# Patient Record
Sex: FEMALE | Race: WHITE | NOT HISPANIC OR LATINO
[De-identification: names, ages, dates, MRNs, and addresses within clinical notes are randomized per-mention and may not be internally consistent; named-entity substitution may affect disease eponyms.]

---

## 2017-09-26 ENCOUNTER — APPOINTMENT (OUTPATIENT)
Dept: ORTHOPEDIC SURGERY | Facility: CLINIC | Age: 82
End: 2017-09-26
Payer: MEDICARE

## 2017-09-26 DIAGNOSIS — S20.222A CONTUSION OF LEFT BACK WALL OF THORAX, INITIAL ENCOUNTER: ICD-10-CM

## 2017-09-26 PROCEDURE — 20610 DRAIN/INJ JOINT/BURSA W/O US: CPT | Mod: RT

## 2017-09-26 PROCEDURE — 99212 OFFICE O/P EST SF 10 MIN: CPT | Mod: 25

## 2017-09-26 RX ORDER — ASPIRIN 81 MG
81 TABLET, DELAYED RELEASE (ENTERIC COATED) ORAL
Refills: 0 | Status: ACTIVE | COMMUNITY

## 2017-09-26 RX ORDER — TOLTERODINE TARTRATE 4 MG/1
4 CAPSULE, EXTENDED RELEASE ORAL
Qty: 30 | Refills: 0 | Status: COMPLETED | COMMUNITY
Start: 2017-04-25

## 2017-09-26 RX ORDER — LINACLOTIDE 145 UG/1
145 CAPSULE, GELATIN COATED ORAL
Qty: 30 | Refills: 0 | Status: COMPLETED | COMMUNITY
Start: 2017-01-13

## 2018-02-21 ENCOUNTER — EMERGENCY (EMERGENCY)
Facility: HOSPITAL | Age: 83
LOS: 1 days | Discharge: HOME | End: 2018-02-21
Attending: EMERGENCY MEDICINE | Admitting: EMERGENCY MEDICINE
Payer: MEDICARE

## 2018-02-21 VITALS
OXYGEN SATURATION: 97 % | DIASTOLIC BLOOD PRESSURE: 83 MMHG | HEIGHT: 62 IN | HEART RATE: 70 BPM | RESPIRATION RATE: 16 BRPM | SYSTOLIC BLOOD PRESSURE: 133 MMHG | WEIGHT: 115.08 LBS | TEMPERATURE: 98 F

## 2018-02-21 VITALS
DIASTOLIC BLOOD PRESSURE: 73 MMHG | SYSTOLIC BLOOD PRESSURE: 128 MMHG | HEART RATE: 68 BPM | RESPIRATION RATE: 18 BRPM | OXYGEN SATURATION: 98 % | TEMPERATURE: 98 F
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
## 2018-02-21 DIAGNOSIS — M54.5 LOW BACK PAIN: ICD-10-CM
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
## 2018-02-21 DIAGNOSIS — Z79.899 OTHER LONG TERM (CURRENT) DRUG THERAPY: ICD-10-CM

## 2018-02-21 DIAGNOSIS — S32.018A OTHER FRACTURE OF FIRST LUMBAR VERTEBRA, INITIAL ENCOUNTER FOR CLOSED FRACTURE: ICD-10-CM
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
## 2018-02-21 DIAGNOSIS — Z88.0 ALLERGY STATUS TO PENICILLIN: ICD-10-CM

## 2018-02-21 DIAGNOSIS — Z79.82 LONG TERM (CURRENT) USE OF ASPIRIN: ICD-10-CM
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
## 2018-02-21 DIAGNOSIS — Z98.890 OTHER SPECIFIED POSTPROCEDURAL STATES: Chronic | ICD-10-CM

## 2018-02-21 PROCEDURE — 70450 CT HEAD/BRAIN W/O DYE: CPT

## 2018-02-21 PROCEDURE — 72100 X-RAY EXAM L-S SPINE 2/3 VWS: CPT | Mod: 26

## 2018-02-21 PROCEDURE — 70450 CT HEAD/BRAIN W/O DYE: CPT | Mod: 26

## 2018-02-21 PROCEDURE — 72100 X-RAY EXAM L-S SPINE 2/3 VWS: CPT

## 2018-02-21 PROCEDURE — 99284 EMERGENCY DEPT VISIT MOD MDM: CPT | Mod: 25

## 2018-02-21 RX ORDER — OXYCODONE AND ACETAMINOPHEN 5; 325 MG/1; MG/1
1 TABLET ORAL ONCE
Qty: 0 | Refills: 0 | Status: DISCONTINUED | OUTPATIENT
Start: 2018-02-21 | End: 2018-02-21

## 2018-02-21 RX ADMIN — OXYCODONE AND ACETAMINOPHEN 1 TABLET(S): 5; 325 TABLET ORAL at 12:40

## 2018-02-21 RX ADMIN — OXYCODONE AND ACETAMINOPHEN 1 TABLET(S): 5; 325 TABLET ORAL at 12:09

## 2018-02-21 NOTE — ED ADULT TRIAGE NOTE - OTHER COMPLAINTS
pt c.o mechanical fall last thursday, admits to hitting head, no loc. no daily blood thinner. c.o lower back pain. pt able to ambulate with walker assist.

## 2018-02-21 NOTE — ED PROVIDER NOTE - DIAGNOSTIC INTERPRETATION
ER Physician Assistant: Harish Edouard PA-C  INTERPRETATION:  + acute fracture; no soft tissue swelling noted; scoliosis

## 2018-02-21 NOTE — ED PROVIDER NOTE - MEDICAL DECISION MAKING DETAILS
will DC FU PMD given pain meds has walker I have discussed the discharge plan with the patient. The patient agrees with the plan, as discussed.  The patient understands Emergency Department diagnosis is a preliminary diagnosis often based on limited information and that the patient must adhere to the follow-up plan as discussed.  The patient understands that if the symptoms worsen or if prescribed medications do not have the desired/planned effect that the patient may return to the Emergency Department at any time for further evaluation and treatment.

## 2018-02-21 NOTE — ED PROVIDER NOTE - OBJECTIVE STATEMENT
88 y/o female with pmh of fractures and falls presents to ED complaining of lower back pain resulting from fall last Thursday (2/15) when she suddenly fell backwards while using her walker in the apt. Pt hit her head, complaining of pain at back of head at the time of fall, which has since resolved. Pain in lower back has remained constant, taking Tylenol, last dose 8:30 am. States she has poor balance and weakness. Denies LOC, dizziness, heart palpitations. no fever no dizzy no headache no chills no NVD no chest pain no SOB no shakes no aches no other  injury no other complaints

## 2018-02-21 NOTE — ED PROVIDER NOTE - ATTENDING CONTRIBUTION TO CARE
88 y/o female with pmh of fractures and falls presents to ED complaining of lower back pain resulting from fall last Thursday (2/15) when she suddenly fell backwards while using her walker in the apt. Pt hit her head, complaining of pain at back of head at the time of fall, which has since resolved. Pain in lower back has remained constant, taking Tylenol, last dose 8:30 am. States she has poor balance and weakness. Denies LOC, dizziness, heart palpitations. no fever no dizzy no headache no chills no NVD no chest pain no SOB no shakes no aches no other  injury no other complaints  PE no acute distress, pain over lumbar area without neuro deficits on exam, imaging shows L1 compression fx - pt aware but requests discharge, able to ambulate with walker which is her baseline, pain control, ortho and PMD follow up

## 2018-02-21 NOTE — ED ADULT NURSE NOTE - OBJECTIVE STATEMENT
88 y/o female c/o lower back pain since last week, getting worst today. pt report a mechanical fall last week, denies LOC but since then started having back pain. Pt states " I have back pain for about year and half now, I fell that time and had a compression fracture, last week I fell again and when I told my PCP she advised me to come to ER". Pt able to ambulate with a walker, c/o pain 4/10 and intermittent tingling sensation "

## 2018-02-26 ENCOUNTER — APPOINTMENT (OUTPATIENT)
Dept: ORTHOPEDIC SURGERY | Facility: CLINIC | Age: 83
End: 2018-02-26
Payer: MEDICARE

## 2018-02-26 DIAGNOSIS — M70.61 TROCHANTERIC BURSITIS, RIGHT HIP: ICD-10-CM

## 2018-02-26 DIAGNOSIS — M70.62 TROCHANTERIC BURSITIS, LEFT HIP: ICD-10-CM

## 2018-02-26 PROCEDURE — 73521 X-RAY EXAM HIPS BI 2 VIEWS: CPT

## 2018-02-26 PROCEDURE — 99214 OFFICE O/P EST MOD 30 MIN: CPT

## 2018-03-08 ENCOUNTER — RX RENEWAL (OUTPATIENT)
Age: 83
End: 2018-03-08

## 2018-10-10 ENCOUNTER — APPOINTMENT (OUTPATIENT)
Dept: ORTHOPEDIC SURGERY | Facility: CLINIC | Age: 83
End: 2018-10-10
Payer: MEDICARE

## 2018-10-10 DIAGNOSIS — M25.561 PAIN IN RIGHT KNEE: ICD-10-CM

## 2018-10-10 PROCEDURE — 73501 X-RAY EXAM HIP UNI 1 VIEW: CPT | Mod: RT

## 2018-10-10 PROCEDURE — 99214 OFFICE O/P EST MOD 30 MIN: CPT

## 2018-10-10 RX ORDER — OXYCODONE AND ACETAMINOPHEN 5; 325 MG/1; MG/1
5-325 TABLET ORAL
Qty: 30 | Refills: 0 | Status: COMPLETED | COMMUNITY
Start: 2018-02-26 | End: 2018-03-10

## 2018-10-11 ENCOUNTER — FORM ENCOUNTER (OUTPATIENT)
Age: 83
End: 2018-10-11

## 2018-10-12 ENCOUNTER — OUTPATIENT (OUTPATIENT)
Dept: OUTPATIENT SERVICES | Facility: HOSPITAL | Age: 83
LOS: 1 days | End: 2018-10-12
Payer: MEDICARE

## 2018-10-12 ENCOUNTER — APPOINTMENT (OUTPATIENT)
Dept: MRI IMAGING | Facility: HOSPITAL | Age: 83
End: 2018-10-12
Payer: MEDICARE

## 2018-10-12 ENCOUNTER — RESULT REVIEW (OUTPATIENT)
Age: 83
End: 2018-10-12

## 2018-10-12 DIAGNOSIS — Z98.890 OTHER SPECIFIED POSTPROCEDURAL STATES: Chronic | ICD-10-CM

## 2018-10-12 PROCEDURE — 73721 MRI JNT OF LWR EXTRE W/O DYE: CPT | Mod: 26,RT

## 2018-10-12 PROCEDURE — 73721 MRI JNT OF LWR EXTRE W/O DYE: CPT

## 2018-10-18 RX ORDER — OXYCODONE AND ACETAMINOPHEN 2.5; 325 MG/1; MG/1
2.5-325 TABLET ORAL
Qty: 32 | Refills: 0 | Status: COMPLETED | COMMUNITY
Start: 2018-03-08 | End: 2018-08-27

## 2018-10-25 ENCOUNTER — APPOINTMENT (OUTPATIENT)
Dept: ORTHOPEDIC SURGERY | Facility: CLINIC | Age: 83
End: 2018-10-25
Payer: MEDICARE

## 2018-10-25 DIAGNOSIS — S70.01XA CONTUSION OF RIGHT HIP, INITIAL ENCOUNTER: ICD-10-CM

## 2018-10-25 DIAGNOSIS — S70.11XA CONTUSION OF RIGHT HIP, INITIAL ENCOUNTER: ICD-10-CM

## 2018-10-25 PROCEDURE — 99214 OFFICE O/P EST MOD 30 MIN: CPT

## 2018-10-25 PROCEDURE — 72170 X-RAY EXAM OF PELVIS: CPT

## 2018-11-14 ENCOUNTER — APPOINTMENT (OUTPATIENT)
Dept: ORTHOPEDIC SURGERY | Facility: CLINIC | Age: 83
End: 2018-11-14
Payer: MEDICARE

## 2018-11-14 PROCEDURE — 72170 X-RAY EXAM OF PELVIS: CPT

## 2018-11-14 PROCEDURE — 99214 OFFICE O/P EST MOD 30 MIN: CPT

## 2018-11-14 RX ORDER — OXYCODONE AND ACETAMINOPHEN 5; 325 MG/1; MG/1
5-325 TABLET ORAL
Qty: 25 | Refills: 0 | Status: COMPLETED | COMMUNITY
Start: 2018-10-10 | End: 2018-10-22

## 2019-05-07 ENCOUNTER — APPOINTMENT (OUTPATIENT)
Dept: ORTHOPEDIC SURGERY | Facility: CLINIC | Age: 84
End: 2019-05-07
Payer: MEDICARE

## 2019-05-07 DIAGNOSIS — S32.110A NONDISPLACED ZONE I FRACTURE OF SACRUM, INITIAL ENCOUNTER FOR CLOSED FRACTURE: ICD-10-CM

## 2019-05-07 DIAGNOSIS — S32.414A: ICD-10-CM

## 2019-05-07 DIAGNOSIS — S22.000S WEDGE COMPRESSION FRACTURE OF UNSPECIFIED THORACIC VERTEBRA, SEQUELA: ICD-10-CM

## 2019-05-07 DIAGNOSIS — S32.591A OTHER SPECIFIED FRACTURE OF RIGHT PUBIS, INITIAL ENCOUNTER FOR CLOSED FRACTURE: ICD-10-CM

## 2019-05-07 PROCEDURE — 99213 OFFICE O/P EST LOW 20 MIN: CPT

## 2019-05-07 RX ORDER — OXYCODONE AND ACETAMINOPHEN 5; 325 MG/1; MG/1
5-325 TABLET ORAL EVERY 8 HOURS
Qty: 24 | Refills: 0 | Status: COMPLETED | COMMUNITY
Start: 2018-10-18 | End: 2019-05-07

## 2019-05-07 NOTE — PHYSICAL EXAM
[Walker] : ambulates with walker [de-identified] : RIGHT knee\par Knee:\par She walks with a walker.\par Her balance is compromised.\par No erythema, edema, warmth.\par Tender medial joint line.\par Varus alignment RIGHT knee but not LEFT..\par ROM: 0degrees extension to 115 degrees flexion. crepitus\par No instability\par Intact extensor mechanism.\par NVI distally.\par  [LE] : Sensory: Intact in bilateral lower extremities

## 2019-05-07 NOTE — ASSESSMENT
[FreeTextEntry1] : 88-year-old who I treated for many years and has had multiple fractures associated with osteoporosis comes in because of recent persistent knee pain. She's not having any severe, acute pain. She takes Aleve one tablet twice a day and I suggested taking one or 2 Tylenol a day as well if necessary for her pain. Heat and ice. Continue exercise program to keep strong and work on balance.\par She should get a DEXA scan if she hasn't had one. She will see one her last one was in followup with someone on this. In the past she had been on bisphosphonates but nothing in many years as far as I know.\par I talked her about various knee injections including corticosteroid and hyaluronic acid injections. She states that her pain is not bad enough that she would want that now but will consider it if the knee pain worsens.\par She can use over-the-counter topical creams as well as they're helpful. She does use arnica.\par Fortunately her fractures from the fall all healed well.\par Followup as needed

## 2019-05-07 NOTE — HISTORY OF PRESENT ILLNESS
[de-identified] : Ms. Das is now 88 years old and comes in today for her RIGHT knee where she has a history of osteoarthritis.\par She was last seen in November for pelvic fractures. That healed.\par She has chronic pain in the back hips and knees\par . She is not having any acute pain like a insufficiency fracture. Overall she is feeling much better than she has in the past. She is walking with a walker. She does some home exercises. When I last saw her I have recommended she see a metabolic bone specialist but she did and at that time. She has had bone density tests and seen someone in the past. She is not sure when her last DEXA scan was but it was probably several years ago. I will look and see if I can find any DEXA scan on her.\par 4 pain she normally takes one Aleve twice a day

## 2019-09-11 ENCOUNTER — APPOINTMENT (OUTPATIENT)
Dept: ORTHOPEDIC SURGERY | Facility: CLINIC | Age: 84
End: 2019-09-11
Payer: MEDICARE

## 2019-09-11 PROCEDURE — 73030 X-RAY EXAM OF SHOULDER: CPT | Mod: 50

## 2019-09-11 PROCEDURE — 99215 OFFICE O/P EST HI 40 MIN: CPT

## 2019-09-11 NOTE — PHYSICAL EXAM
[UE] : Sensory: Intact in bilateral upper extremities [Rad] : radial 2+ and symmetric bilaterally [Normal Touch] : sensation intact for touch [de-identified] : Shoulders\par There is an abrasion over the LEFT olecranon it looks like it was full thickness with a flap. There is eschar and it is healing and there is no erythema or drainage Minimal tenderness.\par Full elbow range of motion 0-150° and 90° pronation and supination without pain.\par There is a hematoma over the RIGHT posterior arm/triceps region which is somewhat hardBut minimally tender.\par Intact elbow range of motion on the RIGHT side as well without limitation or pain.\par LEFT shoulder is very tender over the distal clavicle near the a.c. joint. Mild tenderness also in bilateral proximal humerus region.\par Range of motion on the RIGHT shoulder actively is with about 160° elevation with discomfort but not severe pain and internal rotation to T11 and external rotation to 50°.\par In the LEFT shoulder there is a lot more pain with elevation actively to 50° and passively to about 80° Stopping because ofpain.\par No obvious deformity is seen.\par Sensation motor intact distally.\par Cervical spinous with mild stiffness but no significant pain. [de-identified] : \par X-ray bilateral shoulders 3 views today show a nondisplaced distal clavicle fracture on the LEFT side without any displacement or elevation.\par No proximal humerus fracture.\par No fractures seen in the RIGHT shoulder.

## 2019-09-11 NOTE — DISCUSSION/SUMMARY
[de-identified] : 80-year-old woman with history of multiple orthopedic injuries in the past and has osteoporosis fell at home a little over a week ago and it looks like she has a nondisplaced distal clavicle fracture on the LEFT and had an abrasion LEFT elbow contusion with hematoma of RIGHT upper arm with possible strain of the RIGHT rotator cuff.\par I recommended a resting to allow the fracture to heal. She was given a sling but can remove the sling if she is sitting in the arm supported. She can move the shoulder slightly her gently if it is pain free. She can use the RIGHT arm as tolerated if there's not pain.\par She may need some physical therapy once she heals. Tylenol if needed for pain.\par She should keep the abrasion cleaned and covered and can wash it in the shower.\par If there is any sign of infection she should let me or her internist now.\par Followup in 3 weeks

## 2019-09-11 NOTE — HISTORY OF PRESENT ILLNESS
[de-identified] : Ms. Das comes in for bilateral shoulder pain. She fell a little over a week ago at home. EMS came to her house. She had an abrasion on the LEFT elbow that was somewhat deep. It was recommended she go to the ER but she didn't.\par She's been keeping the abrasion on the elbow covered. It did bleed a lot.\par She also had a contusion to the RIGHT upper arm and has bruising.\par She has pain in both shoulders much worse on the LEFT than RIGHT. She cannot lift her LEFT arm but can lift the RIGHT one but with pain. She hasn't seen anyone since the fall. She is moving her hands and elbows okay though. She takes Tylenol for pain

## 2019-09-11 NOTE — PROCEDURE
[de-identified] : \par fitted for a sling to LEFT arm\par \par Abrasion over the LEFT posterior olecranon was cleaned and antibiotic ointment was applied followed by a sterile dressing.

## 2019-10-01 PROBLEM — M25.511 BILATERAL SHOULDER PAIN, UNSPECIFIED CHRONICITY: Status: ACTIVE | Noted: 2019-09-11

## 2019-10-02 ENCOUNTER — APPOINTMENT (OUTPATIENT)
Dept: ORTHOPEDIC SURGERY | Facility: CLINIC | Age: 84
End: 2019-10-02
Payer: MEDICARE

## 2019-10-02 DIAGNOSIS — M25.511 PAIN IN RIGHT SHOULDER: ICD-10-CM

## 2019-10-02 DIAGNOSIS — M25.512 PAIN IN RIGHT SHOULDER: ICD-10-CM

## 2019-10-02 PROCEDURE — 99214 OFFICE O/P EST MOD 30 MIN: CPT

## 2019-10-02 PROCEDURE — 73060 X-RAY EXAM OF HUMERUS: CPT | Mod: RT

## 2019-10-02 PROCEDURE — 73000 X-RAY EXAM OF COLLAR BONE: CPT | Mod: 50

## 2019-10-02 NOTE — HISTORY OF PRESENT ILLNESS
[de-identified] : Ms. Das comes in for followup for her LEFT clavicle fracture. she states that the pain has really subsided and she can move her arms better. In general she's getting some achiness and soreness in both upper arms. She uses a walker and when she gets up from a chair she really pushes with the arms a lot and that can be somewhat painful.\par as the LEFT shoulder started to feel better she notices more RIGHT lateral mid humerus pain

## 2019-10-02 NOTE — DISCUSSION/SUMMARY
[de-identified] : 88-year-old distal LEFT clavicle fracture  weeks ago which seems to be healing clinically rather quickly. She should be careful with a lot of pushing or pulling. Pain should guide her. Heat and ice as needed. I think her pain in both shoulders is related to using her upper extremities so much with both ambulation using a walker and also getting up from a chair.She has been working on strengthening her legs to get out of the chair and should continue to do so. She should try to push more with the legs in the arms.\par she did have a contusion to her RIGHT arm. No fractures were seen but there could have been some bruising in the soft tissue and bone. Warm soaks and ice as needed.\par followup as needed

## 2019-10-02 NOTE — PHYSICAL EXAM
[de-identified] : bilateral shoulders\par No edema, ecchymoses, erythema.\par minimal tenderness LEFT distal clavicle. No step-offs.\par Forward elevation actively of both arms is to about 170° elevation without any significant pain. Internal rotation to lower lumbar spine.\par Mild tenderness in the lateral shoulders.\par Sensation and motor intact distally. Intact hand and elbow range of motion.\par Mild tenderness in lateral humerus rIGHT greater than LEFT [de-identified] : exophthalmos related to periorbital fracture and an old fall. Slight drooping RIGHT lip also related to an injury. [de-identified] : \par X-rays of the Bilateral Clavicle 2 views today show good alignment of the LEFT distal clavicle fracture which appears to be healing. Mild arthritis RIGHT a.c. joint.\par AP and lateral x-rays of the RIGHT humerus were taken. Last visit on x-rays of the shoulder there was some dense areas adjacent to the humerus near the deltoid insertion. Today's x-rays show No abnormalities in the soft tissues or bone. Unremarkable.

## 2020-12-09 ENCOUNTER — OUTPATIENT (OUTPATIENT)
Dept: OUTPATIENT SERVICES | Facility: HOSPITAL | Age: 85
LOS: 1 days | End: 2020-12-09

## 2020-12-09 ENCOUNTER — APPOINTMENT (OUTPATIENT)
Dept: OPHTHALMOLOGY | Facility: CLINIC | Age: 85
End: 2020-12-09

## 2020-12-09 DIAGNOSIS — Z98.890 OTHER SPECIFIED POSTPROCEDURAL STATES: Chronic | ICD-10-CM

## 2020-12-10 DIAGNOSIS — H26.491 OTHER SECONDARY CATARACT, RIGHT EYE: ICD-10-CM

## 2021-12-08 ENCOUNTER — APPOINTMENT (OUTPATIENT)
Dept: ORTHOPEDIC SURGERY | Facility: CLINIC | Age: 86
End: 2021-12-08
Payer: MEDICARE

## 2021-12-08 PROCEDURE — 73564 X-RAY EXAM KNEE 4 OR MORE: CPT | Mod: 50

## 2021-12-08 PROCEDURE — 99214 OFFICE O/P EST MOD 30 MIN: CPT | Mod: 25

## 2021-12-08 PROCEDURE — 20610 DRAIN/INJ JOINT/BURSA W/O US: CPT | Mod: LT

## 2021-12-08 NOTE — ASSESSMENT
[FreeTextEntry1] : 89 yo with longstanding right knee arthritis which has gotten progressively worse over time currently severe and also now has some mild left knee arthritis.\par Steroid injection was offered and done in the right knee today which hopefully will give her some relief.  With pain relief perhaps she will feel more steady on her feet.  She should work on strengthening and balance.  She is working with a .  She may see a neurologist and I gave her the name but I am not sure much would be done besides working on the balance.  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  She takes an aspirin a day.  There really has not been any acute event neurologically recently.  I would defer to the neurologist however for any work-up regarding the brain.\par She had hard corn on the tip of her third toe which was debrided today and was very tender.  She should wear good supportive shoes.  She can pad the area.  A flexor tenotomy could be performed to try to straighten out the toe so she is not walking on the tip of the toe which is what is causing the corn.  This can be done in the office.\par She also has callus on the ball of the foot which was debrided.  She should apply Vaseline.\par If she gets good relief on the right knee from steroids we could try the left if it gets worse.  We could also try hyaluronic acid injections in the future.\par She will follow-up as needed.

## 2021-12-08 NOTE — HISTORY OF PRESENT ILLNESS
[de-identified] : Ms. Das is now 89 yo and comes in for bilateral knee pain which is chronic on the right and more recent on the left as well as pain in her right foot and poor balance.  She has had falls due to poor balance and legs giving way.  She had seen a neurologist years ago but thinks perhaps she should see a neurologist again.\par She walks with a walker.\par She has an aide with her today.  She has been doing a lot of home exercises and works out with a .\par Her right knee hurts much more than the left.  She takes Tylenol as needed.  There has not been any injuries.\par She has seen a podiatrist for the right foot.  Someone had debrided some of the callus and corns but then she had ongoing pain.\par She wears sneakers to walk

## 2021-12-08 NOTE — PROCEDURE
[Injection] : Injection [Left] : of the left [Knee Joint] : knee joint [Osteoarthritis] : Osteoarthritis [Patient] : patient [Risk] : risk [Benefits] : benefits [Alternatives] : alternatives [Bleeding] : bleeding [Infection] : infection [Allergic Reaction] : allergic reaction [Verbal Consent Obtained] : verbal consent was obtained prior to the procedure [Alcohol] : Alcohol [Betadine] : Betadine [Ethyl Chloride Spray] : ethyl chloride spray was used as a topical anesthetic [Lateral] : lateral [22] : a 22-gauge [Triamcinolone 40mg/mL___(mL)] : [unfilled] ~UmL of 40mg/mL triamcinolone [Bandage Applied] : a bandage [Tolerated Well] : The patient tolerated the procedure well [None] : none [No Strenuous Activity___day(s)] : avoid strenuous activity for [unfilled] day(s) [PRN] : as needed [de-identified] : Debridement of corns–callus right plantar foot.  On the tip of the third toe where there is a claw toe deformity and hyperflexion there is a hard callus present that is very tender.  There is also callus under the first metatarsal head.  These areas were cleaned with alcohol and then a 10 blade scalpel was used to debride the corn and the callus.  Vaseline was applied to soften the skin.  On her own she should use Vaseline and an emery board to shave down callus.

## 2021-12-08 NOTE — PHYSICAL EXAM
[Normal RLE] : Right Lower Extremity: No scars, rashes, lesions, ulcers, skin intact [Normal LLE] : Left Lower Extremity: No scars, rashes, lesions, ulcers, skin intact [Normal Touch] : sensation intact for touch [Normal] : Oriented to person, place, and time, insight and judgement were intact and the affect was normal [Shuffling] : shuffling [Walker] : ambulates with walker [LE] : Sensory: Intact in bilateral lower extremities [de-identified] : Knees\par No edema, ecchymoses, erythema.\par No effusion.\par Tender right greater than left knee patella facets and medial greater than lateral joint line.\par Knee range of motion is from about 5 to 10 degree flexion contracture to 125 degrees on the right with pain and patellofemoral crepitus.\par On the left knee 0 to 130 degrees with mild discomfort.\par Knees are stable to exam.\par Intact extensor mechanism.\par \par Right foot\par She has hammering and claw toes.  In the tip of the third toe is a hard corn which is very tender.  There is also callus under the first metatarsal which is tender but less than the corn.\par She has tenderness also under the second metatarsal head.\par Thin fat pad.\par Intact anterior tibial tendon, gastrocsoleus, EHL all with good strength.\par Sensation is intact throughout on both feet.\par No edema.  Normal capillary refill and feet are warm. [de-identified] : \par X-rays of bilateral knees 4 views weightbearing today show severe degenerative changes in the right knee with medial joint space narrowing and osteophytes almost bone-on-bone and severe patellofemoral degenerative changes.  On the left knee there is more mild medial and patellofemoral degenerative changes present.

## 2021-12-26 ENCOUNTER — EMERGENCY (EMERGENCY)
Facility: HOSPITAL | Age: 86
LOS: 1 days | Discharge: HOME | End: 2021-12-26
Attending: EMERGENCY MEDICINE | Admitting: EMERGENCY MEDICINE
Payer: MEDICARE

## 2021-12-26 VITALS
HEIGHT: 62 IN | WEIGHT: 119.93 LBS | TEMPERATURE: 99 F | RESPIRATION RATE: 20 BRPM | DIASTOLIC BLOOD PRESSURE: 74 MMHG | HEART RATE: 98 BPM | OXYGEN SATURATION: 95 % | SYSTOLIC BLOOD PRESSURE: 149 MMHG

## 2021-12-26 DIAGNOSIS — S22.089A UNSPECIFIED FRACTURE OF T11-T12 VERTEBRA, INITIAL ENCOUNTER FOR CLOSED FRACTURE: ICD-10-CM
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
## 2021-12-26 DIAGNOSIS — Z79.82 LONG TERM (CURRENT) USE OF ASPIRIN: ICD-10-CM
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
## 2021-12-26 DIAGNOSIS — S32.049A UNSPECIFIED FRACTURE OF FOURTH LUMBAR VERTEBRA, INITIAL ENCOUNTER FOR CLOSED FRACTURE: ICD-10-CM

## 2021-12-26 DIAGNOSIS — S32.039A UNSPECIFIED FRACTURE OF THIRD LUMBAR VERTEBRA, INITIAL ENCOUNTER FOR CLOSED FRACTURE: ICD-10-CM

## 2021-12-26 DIAGNOSIS — S32.029A UNSPECIFIED FRACTURE OF SECOND LUMBAR VERTEBRA, INITIAL ENCOUNTER FOR CLOSED FRACTURE: ICD-10-CM

## 2021-12-26 DIAGNOSIS — Z20.822 CONTACT WITH AND (SUSPECTED) EXPOSURE TO COVID-19: ICD-10-CM

## 2021-12-26 DIAGNOSIS — Z88.0 ALLERGY STATUS TO PENICILLIN: ICD-10-CM

## 2021-12-26 DIAGNOSIS — M19.90 UNSPECIFIED OSTEOARTHRITIS, UNSPECIFIED SITE: ICD-10-CM

## 2021-12-26 DIAGNOSIS — Z98.890 OTHER SPECIFIED POSTPROCEDURAL STATES: Chronic | ICD-10-CM

## 2021-12-26 DIAGNOSIS — M54.50 LOW BACK PAIN, UNSPECIFIED: ICD-10-CM

## 2021-12-26 PROCEDURE — 71045 X-RAY EXAM CHEST 1 VIEW: CPT | Mod: 26

## 2021-12-26 PROCEDURE — 72170 X-RAY EXAM OF PELVIS: CPT | Mod: 26

## 2021-12-26 PROCEDURE — 93010 ELECTROCARDIOGRAM REPORT: CPT

## 2021-12-26 PROCEDURE — 99284 EMERGENCY DEPT VISIT MOD MDM: CPT | Mod: 25

## 2021-12-26 NOTE — ED ADULT NURSE NOTE - NSIMPLEMENTINTERV_GEN_ALL_ED
Implemented All Fall with Harm Risk Interventions:  Brilliant to call system. Call bell, personal items and telephone within reach. Instruct patient to call for assistance. Room bathroom lighting operational. Non-slip footwear when patient is off stretcher. Physically safe environment: no spills, clutter or unnecessary equipment. Stretcher in lowest position, wheels locked, appropriate side rails in place. Provide visual cue, wrist band, yellow gown, etc. Monitor gait and stability. Monitor for mental status changes and reorient to person, place, and time. Review medications for side effects contributing to fall risk. Reinforce activity limits and safety measures with patient and family. Provide visual clues: red socks.

## 2021-12-26 NOTE — ED ADULT NURSE NOTE - OBJECTIVE STATEMENT
s/p mechanical fall at 6pm today while at home, states legs gave out and felt weak, usually uses a walker, denies hitting head, states landed on her buttocks and back, on Aspirin.  Called EMS later tonight as having difficulty walking, unable to walk on own.  No obvious s/s of trauma or deformity noted.

## 2021-12-27 VITALS
DIASTOLIC BLOOD PRESSURE: 79 MMHG | SYSTOLIC BLOOD PRESSURE: 136 MMHG | HEART RATE: 95 BPM | OXYGEN SATURATION: 95 % | RESPIRATION RATE: 18 BRPM | TEMPERATURE: 98 F

## 2021-12-27 LAB
ALBUMIN SERPL ELPH-MCNC: 3.9 G/DL — SIGNIFICANT CHANGE UP (ref 3.3–5)
ALP SERPL-CCNC: 68 U/L — SIGNIFICANT CHANGE UP (ref 40–120)
ALT FLD-CCNC: 11 U/L — SIGNIFICANT CHANGE UP (ref 10–45)
ANION GAP SERPL CALC-SCNC: 5 MMOL/L — SIGNIFICANT CHANGE UP (ref 5–17)
AST SERPL-CCNC: 15 U/L — SIGNIFICANT CHANGE UP (ref 10–40)
BASOPHILS # BLD AUTO: 0.01 K/UL — SIGNIFICANT CHANGE UP (ref 0–0.2)
BASOPHILS NFR BLD AUTO: 0.2 % — SIGNIFICANT CHANGE UP (ref 0–2)
BILIRUB SERPL-MCNC: 0.3 MG/DL — SIGNIFICANT CHANGE UP (ref 0.2–1.2)
BUN SERPL-MCNC: 20 MG/DL — SIGNIFICANT CHANGE UP (ref 7–23)
CALCIUM SERPL-MCNC: 8.5 MG/DL — SIGNIFICANT CHANGE UP (ref 8.4–10.5)
CHLORIDE SERPL-SCNC: 102 MMOL/L — SIGNIFICANT CHANGE UP (ref 96–108)
CO2 SERPL-SCNC: 27 MMOL/L — SIGNIFICANT CHANGE UP (ref 22–31)
CREAT SERPL-MCNC: 0.71 MG/DL — SIGNIFICANT CHANGE UP (ref 0.5–1.3)
EOSINOPHIL # BLD AUTO: 0.01 K/UL — SIGNIFICANT CHANGE UP (ref 0–0.5)
EOSINOPHIL NFR BLD AUTO: 0.2 % — SIGNIFICANT CHANGE UP (ref 0–6)
GLUCOSE SERPL-MCNC: 105 MG/DL — HIGH (ref 70–99)
HCT VFR BLD CALC: 33.9 % — LOW (ref 34.5–45)
HGB BLD-MCNC: 11.3 G/DL — LOW (ref 11.5–15.5)
IMM GRANULOCYTES NFR BLD AUTO: 0.4 % — SIGNIFICANT CHANGE UP (ref 0–1.5)
LYMPHOCYTES # BLD AUTO: 0.48 K/UL — LOW (ref 1–3.3)
LYMPHOCYTES # BLD AUTO: 10 % — LOW (ref 13–44)
MCHC RBC-ENTMCNC: 33.2 PG — SIGNIFICANT CHANGE UP (ref 27–34)
MCHC RBC-ENTMCNC: 33.3 GM/DL — SIGNIFICANT CHANGE UP (ref 32–36)
MCV RBC AUTO: 99.7 FL — SIGNIFICANT CHANGE UP (ref 80–100)
MONOCYTES # BLD AUTO: 0.54 K/UL — SIGNIFICANT CHANGE UP (ref 0–0.9)
MONOCYTES NFR BLD AUTO: 11.3 % — SIGNIFICANT CHANGE UP (ref 2–14)
NEUTROPHILS # BLD AUTO: 3.72 K/UL — SIGNIFICANT CHANGE UP (ref 1.8–7.4)
NEUTROPHILS NFR BLD AUTO: 77.9 % — HIGH (ref 43–77)
NRBC # BLD: 0 /100 WBCS — SIGNIFICANT CHANGE UP (ref 0–0)
PLATELET # BLD AUTO: 205 K/UL — SIGNIFICANT CHANGE UP (ref 150–400)
POTASSIUM SERPL-MCNC: 3.6 MMOL/L — SIGNIFICANT CHANGE UP (ref 3.5–5.3)
POTASSIUM SERPL-SCNC: 3.6 MMOL/L — SIGNIFICANT CHANGE UP (ref 3.5–5.3)
PROT SERPL-MCNC: 6.4 G/DL — SIGNIFICANT CHANGE UP (ref 6–8.3)
RBC # BLD: 3.4 M/UL — LOW (ref 3.8–5.2)
RBC # FLD: 13.1 % — SIGNIFICANT CHANGE UP (ref 10.3–14.5)
SARS-COV-2 RNA SPEC QL NAA+PROBE: NEGATIVE — SIGNIFICANT CHANGE UP
SODIUM SERPL-SCNC: 134 MMOL/L — LOW (ref 135–145)
WBC # BLD: 4.78 K/UL — SIGNIFICANT CHANGE UP (ref 3.8–10.5)
WBC # FLD AUTO: 4.78 K/UL — SIGNIFICANT CHANGE UP (ref 3.8–10.5)

## 2021-12-27 PROCEDURE — 71045 X-RAY EXAM CHEST 1 VIEW: CPT

## 2021-12-27 PROCEDURE — 71045 X-RAY EXAM CHEST 1 VIEW: CPT | Mod: 26

## 2021-12-27 PROCEDURE — 72170 X-RAY EXAM OF PELVIS: CPT

## 2021-12-27 PROCEDURE — 85025 COMPLETE CBC W/AUTO DIFF WBC: CPT

## 2021-12-27 PROCEDURE — 87635 SARS-COV-2 COVID-19 AMP PRB: CPT

## 2021-12-27 PROCEDURE — 72131 CT LUMBAR SPINE W/O DYE: CPT | Mod: MA

## 2021-12-27 PROCEDURE — 36415 COLL VENOUS BLD VENIPUNCTURE: CPT

## 2021-12-27 PROCEDURE — 80053 COMPREHEN METABOLIC PANEL: CPT

## 2021-12-27 PROCEDURE — 72125 CT NECK SPINE W/O DYE: CPT | Mod: MA

## 2021-12-27 PROCEDURE — 72170 X-RAY EXAM OF PELVIS: CPT | Mod: 26

## 2021-12-27 PROCEDURE — 99284 EMERGENCY DEPT VISIT MOD MDM: CPT | Mod: 25

## 2021-12-27 PROCEDURE — 70450 CT HEAD/BRAIN W/O DYE: CPT | Mod: MA

## 2021-12-27 PROCEDURE — 72131 CT LUMBAR SPINE W/O DYE: CPT | Mod: 26,MA

## 2021-12-27 PROCEDURE — 93005 ELECTROCARDIOGRAM TRACING: CPT

## 2021-12-27 PROCEDURE — 70450 CT HEAD/BRAIN W/O DYE: CPT | Mod: 26,MA

## 2021-12-27 PROCEDURE — 97161 PT EVAL LOW COMPLEX 20 MIN: CPT

## 2021-12-27 PROCEDURE — 72125 CT NECK SPINE W/O DYE: CPT | Mod: 26,MA

## 2021-12-27 RX ORDER — ACETAMINOPHEN 500 MG
650 TABLET ORAL ONCE
Refills: 0 | Status: COMPLETED | OUTPATIENT
Start: 2021-12-27 | End: 2021-12-27

## 2021-12-27 RX ADMIN — Medication 650 MILLIGRAM(S): at 01:10

## 2021-12-27 RX ADMIN — Medication 650 MILLIGRAM(S): at 00:21

## 2021-12-27 NOTE — ED PROVIDER NOTE - PATIENT PORTAL LINK FT
You can access the FollowMyHealth Patient Portal offered by [facility] by registering at the following website: [email or web address]. By joining "[employer]"’s FollowMyHealth portal, you will also be able to view your health information using other applications (apps) compatible with our system.

## 2021-12-27 NOTE — ED PROVIDER NOTE - NSFOLLOWUPINSTRUCTIONS_ED_ALL_ED_FT
You have chronic vertebral fractures      WHAT YOU NEED TO KNOW:    A vertebral compression fracture (VCF) is a collapse or breakdown in a bone in your spine. Compression fractures happen when there is too much pressure on the vertebra. VCFs most often occur in the thoracic (middle) and lumbar (lower) areas of your spine. Fractures may be mild to severe.    DISCHARGE INSTRUCTIONS:    Call your local emergency number (911 in the [place]) if:   •You feel lightheaded, short of breath, and have chest pain.    •You cough up blood.    •You suddenly cannot feel your legs.    •You suddenly have trouble moving your arms or legs.    Return to the emergency department if:   •Your arm or leg feels warm, tender, and painful. It may look swollen and red.    •You have new problems urinating or having bowel movements.    •You have severe pain in your back after falling, bending forward, sneezing, or coughing strongly.    Call your doctor or orthopedist if:   •You cannot sleep or rest because of back pain.    •You have pain or swelling in your back that is getting worse, or does not go away.    •You have questions or concerns about your condition or care.    Medicines: You may need any of the following:   •NSAIDs, such as ibuprofen, help decrease swelling, pain, and fever. This medicine is available with or without a doctor's order. NSAIDs can cause stomach bleeding or kidney problems in certain people. If you take blood thinner medicine, always ask if NSAIDs are safe for you. Always read the medicine label and follow directions. Do not give these medicines to children under 6 months of age without direction from your child's healthcare provider.    •Acetaminophen decreases pain and fever. It is available without a doctor's order. Ask how much to take and how often to take it. Follow directions. Read the labels of all other medicines you are using to see if they also contain acetaminophen, or ask your doctor or pharmacist. Acetaminophen can cause liver damage if not taken correctly. Do not use more than 4 grams (4,000 milligrams) total of acetaminophen in one day.     •Prescription pain medicine may be given. Ask your healthcare provider how to take this medicine safely. Some prescription pain medicines contain acetaminophen. Do not take other medicines that contain acetaminophen without talking to your healthcare provider. Too much acetaminophen may cause liver damage. Prescription pain medicine may cause constipation. Ask your healthcare provider how to prevent or treat constipation.     •Bisphosphonates and calcitonin may be recommended to help your bones get stronger. They can decrease the pain of a VCF caused by osteoporosis, and decrease your risk for another fracture.    •Take your medicine as directed. Contact your healthcare provider if you think your medicine is not helping or if you have side effects. Tell him or her if you are allergic to any medicine. Keep a list of the medicines, vitamins, and herbs you take. Include the amounts, and when and why you take them. Bring the list or the pill bottles to follow-up visits. Carry your medicine list with you in case of an emergency.    Heat and ice:   •Apply ice on your back for 15 to 20 minutes every hour or as directed. Use an ice pack, or put crushed ice in a plastic bag. Cover it with a towel before you apply it. Ice helps prevent tissue damage and decreases swelling and pain.    •Apply heat on your back for 20 to 30 minutes every 2 hours for as many days as directed. Heat helps decrease pain and muscle spasms.    Activity:   •Avoid activities that may make the pain worse, such as picking up heavy objects. When the pain decreases, begin normal, slow movements as directed by your healthcare provider. Your healthcare provider may have you do weight-bearing exercises such as walking. You may also do non-weight-bearing exercises such as swimming and bicycling.    •You may need to use a walker or cane. Ask your healthcare provider for more information about how to use a cane or a walker.    •When you  objects, bend at the hips and knees. Never bend from the waist only. Use bent knees and your leg muscles as you lift the object. While you lift the object, keep it close to your chest. Try not to twist or lift anything above your waist.    Physical and occupational therapy: Your healthcare provider may recommend you start or continue one or both types of therapy. A physical therapist teaches you exercises to help improve movement and strength, and to decrease pain. An occupational therapist teaches you skills to help with your daily activities.    Manage pain during sleep:   •Do not sleep on a waterbed. Waterbeds do not provide good back support.    •Sleep on a firm mattress. You may also put a ½ to 1-inch piece of plywood between the mattress and box spring.    •Sleep on your back with a pillow under your knees. This will decrease pressure on your back. You may also sleep on your side with 1 or both of your knees bent and a pillow between them. It may also be helpful to sleep on your stomach with a pillow under you at waist level.    Follow up with your doctor or orthopedist as directed: You may need to return for x-rays or other tests. Write down your questions so you remember to ask them during your visits.

## 2021-12-27 NOTE — ED PROVIDER NOTE - PROGRESS NOTE DETAILS
chronic fractures on CT.  pt unable to get out of bed, will have pt evaluated by PT this morning. pt does not wish to be admitted to the hospital if possible. pt signed out pending PT consult.  Pt ambulatory with walker, wanting to be d/c, seen by case management, will increase home care hours, d/c

## 2021-12-27 NOTE — ED PROVIDER NOTE - CARE PLAN
1 Principal Discharge DX:	Fall  Secondary Diagnosis:	Chronic vertebral fracture due to osteoporosis

## 2021-12-27 NOTE — PHYSICAL THERAPY INITIAL EVALUATION ADULT - THERAPY FREQUENCY, PT EVAL
Patient educated on frequency of inpatient physical therapy at [facility], patient verbalized understanding./2-3x/week

## 2021-12-27 NOTE — PHYSICAL THERAPY INITIAL EVALUATION ADULT - ADDITIONAL COMMENTS
Patient reports previously ambulatory with rollator and assist from HHA. Patient endorses having a HHA daily although between different people and different hours. Patient reports history of falls although not frequently.

## 2021-12-27 NOTE — CONSULT NOTE ADULT - SUBJECTIVE AND OBJECTIVE BOX
Patient is being evaluated by podiatry as part of the Fall Prevention Study    Patient is a 89y old  Female who presents with a chief complaint of fall.    HPI: 89F hx breast ca (s/p mastectomy), arthritis, s/p fall. pt states she ambulates with her walker and fell tonight. states she's not sure how she fell but thinks she probable fell backwards.  pt states she has frequently fallen over the past few years. c/o lower back pain. no HA. no LOC. no vomiting. no chest pain. no SOB.  takes aspirin daily.    Pt endorses to experiencing some memory loss. She states her fall occurred during the day yesterday and not overnight. Pt constantly lost track of what she was saying throughout the encounter. Pt also asked us to repeat questions midway while answering it, because she forgot what the question was.       Review Of Systems  All other ROS are negative, unless stated above.     PAST MEDICAL & SURGICAL HISTORY:  Breast CA    Falls    H/O mastectomy, right      Home Medications:  acetaminophen 325 mg oral tablet: 2 tab(s) orally every 6 hours, As Needed for pain  (21 Feb 2018 10:43)  anastrozole 1 mg oral tablet: 1 tab(s) orally once a day (21 Feb 2018 10:43)  aspirin 81 mg oral tablet: 1 tab(s) orally once a day (21 Feb 2018 10:43)  FLUoxetine 20 mg oral tablet:  orally 2 times a day (21 Feb 2018 10:43)  PROzac:  (21 Feb 2018 10:43)  tolterodine 4 mg oral capsule, extended release: 1 cap(s) orally once a day (21 Feb 2018 10:43)    Allergies    penicillin (Unknown)    Intolerances      FAMILY HISTORY:    Social History:     LABS                        11.3   4.78  )-----------( 205      ( 27 Dec 2021 05:30 )             33.9     12-27    134<L>  |  102  |  20  ----------------------------<  105<H>  3.6   |  27  |  0.71    Ca    8.5      27 Dec 2021 05:30    TPro  6.4  /  Alb  3.9  /  TBili  0.3  /  DBili  x   /  AST  15  /  ALT  11  /  AlkPhos  68  12-27        Vital Signs Last 24 Hrs  T(C): 36.8 (27 Dec 2021 09:02), Max: 37.3 (26 Dec 2021 23:36)  T(F): 98.3 (27 Dec 2021 09:02), Max: 99.1 (26 Dec 2021 23:36)  HR: 78 (27 Dec 2021 09:02) (78 - 98)  BP: 135/84 (27 Dec 2021 09:02) (133/82 - 149/74)  BP(mean): --  RR: 18 (27 Dec 2021 09:02) (16 - 20)  SpO2: 97% (27 Dec 2021 09:02) (95% - 97%)    MEDICATION REVIEW     Subjective -   SELF-ASSESSMENT SCORE (Patient's Score)    FALL RISK/FALL HISTORY QUESTIONNAIRE  (Please see handout for more detailed questionnaire)        a. Greater than 65 years of age?x __Yes  __ No        b. Information on Current Fall        Where did fall occur? At home         What type of fall did you experience? __Loss of Footing  _x__Loss of Balance  ___Unknown  ___Slipping or Tripping  ___Other         When did the fall occur? Yesterday        On scale from 1-10, how severe/painful was injury? 8        c. History of Prior Falls in the Past Five Years         How many falls have you experienced in the past five years? ___One   ___Two  _x_Three or more          Where do your falls often occur? ___At Home   __x_Other___outdoor and indoor at home ___________         When do the falls typically occur? _x__Morning  __x__Afternoon  ___Night          In the past five years, how did you treat the falls you experienced? _x_Self-treatment x__Emergency Department __ Hospital admission/surgery         What are the primary reasons for your fall? __loss of footing __tripping on environmental factors __x loss of balance __unknown          On a scale from 1-10 how severe/ painful were your falls in the past five years? 8                  d. MEMORY/COGNITION          a. Has the patient been diagnosed with any of the following? Pt endorses to dementia but hasn't professionally been dx'd         _x_ Dementia __ALzheimer's __Cerebrovascular __CVA/TIA          e. AMBULATORY AIDS          a. Does the patient have problems doing any of the following- please select all that apply:          _x_Walking  __Feeding  __Dressing & grooming  __Toileting __Bathing __Transferring           b. Did the patient feel lightheaded when standing from a sitting position? x__ Yes __No           c. Does the patient use any of the following- please select all that apply: _x__Cane  __x_Walker  ___Wheelchair  ___Brace/ AFO  ___Other            d. Did the patient have any questions regarding an ambulatory aid? No    RISK STRATIFICATION    [x ] 2 or more falls --> High risk Had over 30 falls in past 10 years   [ ] 1 fall resulting in injury --> High risk   [ ] 1 fall resulting in no injury --> Moderate risk   [ ] 0 falls with gait, strength, balance problems --> moderate risk  [ ] 0 falls without issues --> low risk       PHYSICAL EXAM  General: Well appearing patient with good hygeine and normal body habitus    Lower Extremity Focused:  Vasc: DP 2/4 b/l and PT 1/4 b/l; CFT brisk x10; no edema or erythema. Varicosities present diffusely throughout both feet   Derm: Skin is clean, dry and intact. No open lesions. Hyperkeratotic lesion on submet 1 on R foot and distal tip of 3rd toe on the R foot. Pinched callus on the L (medial IPJ of hallux)   Neuro: Protective sensation intact  MSK: 5/5 muscle strength in all crural compartments b/l. Hammering of the lesser digits b/l. 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     MRN: [id number]  Age: 89y    Hip Internal ROM R: 30  L:   30  Hip External ROM R:40   L:    40  Reference: Internal (30-45); External (40-50)    Sagittal Knee Position:  Right: [x ] Normal, [ ] Flexed, [ ] Recurvatum  Left: [x ] Normal, [ ] Flexed, [ ] Recurvatum    Frontal Plane Leg:  Right: [x ] Normal, [ ] Varum, [ ] Valgum  Left: [x ] Normal, [ ] Varum, [ ] Valgum    Malleolar Position:  Right: [x ] External, [ ] Internal  Left: [ x] External, [ ] Internal    Limb Length Discrepancy  Right: [ ] Longer, [ ] Bath  _0__ cm  Left: [ ] Longer, [ ] Bath  _0__ cm    Ankle, knee extended  Right: [ ] Normal, [x ] Limited, [ ] Crepitus  Left: [ ] Normal, [ x] Limited, [ ] Crepitus    Ankle, knee flexed  Right: [ ] Normal, [ x] Limited, [ ] Crepitus  Left: [ ] Normal, [ x] Limited, [ ] Crepitus    RCSP  Right: [ ] Vertical, [ ] Varus, [x ] Valgus  Left: [ ] Vertical, [ ] Varus, [ x] Valgus    NCSP:  Right: [ ] Vertical, [ ] Varus, [ x] Valgus  Left: [ ] Vertical, [ ] Varus, [x ] Valgus    STJ ROM:  Right: [x ] Normal, [ ] Limited, [ ] Crepitus  Left: [x ] Normal, [ ] Limited, [ ] Crepitus    MTJ ROM:  Right: [x ] Normal, [ ] Limited, [ ] Crepitus  Left: [x ] Normal, [ ] Limited, [ ] Crepitus    FF to RF Relationship  Right: [ ] Normal, [ x] Varus, [ ] Valgus  Left: [ ] Normal, [ x] Varus, [ ] Valgus    1st Ray Position  Right: [ ] Neutral, [ ] Dorsiflexed, [ ] Plantarflexed, [x ] Hypermobile  Left: [ ] Neutral, [ ] Dorsiflexed, [ ] Plantarflexed, [x ] Hypermobile    1st MTP ROM, loaded  Right: [ ] Normal, [x ] Limited, [ ] Crepitus  Left: [ ] Normal, [ x] Limited, [ ] Crepitus    1st MTP ROM, unloaded  Right: [ ] Normal, [x ] Limited, [ ] Crepitus  Left: [] Normal, [ x] Limited, [ ] Crepitus    Muscle Strength  Posterior Group  R: [x ] 5, [ ] 4, [ ] 3, [ ] 2, [ ] 1  L: [x ] 5, [ ] 4, [ ] 3, [ ] 2, [ ] 1  Anterior Group  R: [ x] 5, [ ] 4, [ ] 3, [ ] 2, [ ] 1  L: [x ] 5, [ ] 4, [ ] 3, [ ] 2, [ ] 1  Medial Group  R: [x ] 5, [ ] 4, [ ] 3, [ ] 2, [ ] 1  L: [x ] 5, [ ] 4, [ ] 3, [ ] 2, [ ] 1  Lateral Group  R: [x ] 5, [ ] 4, [ ] 3, [ ] 2, [ ] 1  L: [x ] 5, [ ] 4, [ ] 3, [ ] 2, [ ] 1    Gait Examination: Normal gait & stance, however ambulates with walker   Treatment: Per plan          Recommendations:  - Recommend PT for gait and strength training- PT has already seen her- please refer to PT notes   - Recommend home visit to evaluate risk factors of falls  - Continue care with Podiatrist

## 2021-12-27 NOTE — PHYSICAL THERAPY INITIAL EVALUATION ADULT - GAIT DEVIATIONS NOTED, PT EVAL
slightly unsteady gait however no LOB/knee buckling noted; flexed forward posture, **significantly increased time required with turning; encouraged patient to increase B/L step-height with stepping however patient states "When I do that, that's when I fall"/decreased lisa/decreased velocity of limb motion/decreased step length/decreased weight-shifting ability

## 2021-12-27 NOTE — ED PROVIDER NOTE - OBJECTIVE STATEMENT
89F hx breast ca (s/p mastectomy), arthritis, s/p fall. pt states she ambulates with her walker and fell tonight. states she's not sure how she fell but thinks she probable fell backwards.  pt states she has frequently fallen over the past few years. c/o lower back pain. no HA. no LOC. no vomiting. no chest pain. no SOB.  takes aspirin daily.

## 2021-12-27 NOTE — PHYSICAL THERAPY INITIAL EVALUATION ADULT - DISCHARGE DISPOSITION, PT EVAL
Home with Home PT and increased HHA hours (endorsed by patient "she will go home with someone to sleep over tonight" and confirmed process with KYLIE Almonte); patient declining DOT at this time

## 2021-12-27 NOTE — ED PROVIDER NOTE - CLINICAL SUMMARY MEDICAL DECISION MAKING FREE TEXT BOX
s/p fall tonight, c/o lower back pain. no focal neuro deficits, no bony deformities on exam, no HA  -check xrays  -check CT  -tylenol

## 2021-12-27 NOTE — PHYSICAL THERAPY INITIAL EVALUATION ADULT - PERTINENT HX OF CURRENT PROBLEM, REHAB EVAL
89F hx breast ca (s/p mastectomy), arthritis, s/p fall. pt states she ambulates with her walker and fell tonight. states she's not sure how she fell but thinks she probable fell backwards.  pt states she has frequently fallen over the past few years. c/o lower back pain. no HA. no LOC. no vomiting. no chest pain. no SOB.  takes aspirin daily. Please refer to H&P on [place] for remaining.

## 2021-12-27 NOTE — PHYSICAL THERAPY INITIAL EVALUATION ADULT - GENERAL OBSERVATIONS, REHAB EVAL
PT IE completed. Chart reviewed. Patient without complaints of pain at rest, agreeable to PT. Patient received semi-supine, NAD, +IV hep CHELO simmons ([facility] ED).

## 2022-04-19 ENCOUNTER — INPATIENT (INPATIENT)
Facility: HOSPITAL | Age: 87
LOS: 1 days | Discharge: HOME | DRG: 552 | End: 2022-04-21
Admitting: HOSPITALIST
Payer: MEDICARE

## 2022-04-19 VITALS
WEIGHT: 115.08 LBS | OXYGEN SATURATION: 97 % | HEART RATE: 68 BPM | TEMPERATURE: 98 F | DIASTOLIC BLOOD PRESSURE: 90 MMHG | HEIGHT: 62 IN | SYSTOLIC BLOOD PRESSURE: 168 MMHG | RESPIRATION RATE: 18 BRPM

## 2022-04-19 DIAGNOSIS — Z98.890 OTHER SPECIFIED POSTPROCEDURAL STATES: Chronic | ICD-10-CM

## 2022-04-19 LAB
ALBUMIN SERPL ELPH-MCNC: 3.7 G/DL — SIGNIFICANT CHANGE UP (ref 3.3–5)
ALP SERPL-CCNC: 62 U/L — SIGNIFICANT CHANGE UP (ref 40–120)
ALT FLD-CCNC: 14 U/L — SIGNIFICANT CHANGE UP (ref 10–45)
ANION GAP SERPL CALC-SCNC: 9 MMOL/L — SIGNIFICANT CHANGE UP (ref 5–17)
AST SERPL-CCNC: 22 U/L — SIGNIFICANT CHANGE UP (ref 10–40)
BILIRUB SERPL-MCNC: 0.2 MG/DL — SIGNIFICANT CHANGE UP (ref 0.2–1.2)
BUN SERPL-MCNC: 21 MG/DL — SIGNIFICANT CHANGE UP (ref 7–23)
CALCIUM SERPL-MCNC: 9.1 MG/DL — SIGNIFICANT CHANGE UP (ref 8.4–10.5)
CHLORIDE SERPL-SCNC: 104 MMOL/L — SIGNIFICANT CHANGE UP (ref 96–108)
CO2 SERPL-SCNC: 26 MMOL/L — SIGNIFICANT CHANGE UP (ref 22–31)
CREAT SERPL-MCNC: 0.73 MG/DL — SIGNIFICANT CHANGE UP (ref 0.5–1.3)
EGFR: 78 ML/MIN/1.73M2 — SIGNIFICANT CHANGE UP
GLUCOSE SERPL-MCNC: 99 MG/DL — SIGNIFICANT CHANGE UP (ref 70–99)
HCT VFR BLD CALC: 36.2 % — SIGNIFICANT CHANGE UP (ref 34.5–45)
HGB BLD-MCNC: 11.8 G/DL — SIGNIFICANT CHANGE UP (ref 11.5–15.5)
MCHC RBC-ENTMCNC: 32.6 GM/DL — SIGNIFICANT CHANGE UP (ref 32–36)
MCHC RBC-ENTMCNC: 32.8 PG — SIGNIFICANT CHANGE UP (ref 27–34)
MCV RBC AUTO: 100.6 FL — HIGH (ref 80–100)
NRBC # BLD: 0 /100 WBCS — SIGNIFICANT CHANGE UP (ref 0–0)
PLATELET # BLD AUTO: 229 K/UL — SIGNIFICANT CHANGE UP (ref 150–400)
POTASSIUM SERPL-MCNC: 4.4 MMOL/L — SIGNIFICANT CHANGE UP (ref 3.5–5.3)
POTASSIUM SERPL-SCNC: 4.4 MMOL/L — SIGNIFICANT CHANGE UP (ref 3.5–5.3)
PROT SERPL-MCNC: 6.7 G/DL — SIGNIFICANT CHANGE UP (ref 6–8.3)
RBC # BLD: 3.6 M/UL — LOW (ref 3.8–5.2)
RBC # FLD: 13 % — SIGNIFICANT CHANGE UP (ref 10.3–14.5)
SARS-COV-2 RNA SPEC QL NAA+PROBE: NEGATIVE — SIGNIFICANT CHANGE UP
SODIUM SERPL-SCNC: 139 MMOL/L — SIGNIFICANT CHANGE UP (ref 135–145)
WBC # BLD: 7.41 K/UL — SIGNIFICANT CHANGE UP (ref 3.8–10.5)
WBC # FLD AUTO: 7.41 K/UL — SIGNIFICANT CHANGE UP (ref 3.8–10.5)

## 2022-04-19 PROCEDURE — 71045 X-RAY EXAM CHEST 1 VIEW: CPT | Mod: 26

## 2022-04-19 PROCEDURE — 99285 EMERGENCY DEPT VISIT HI MDM: CPT | Mod: FS,25

## 2022-04-19 PROCEDURE — 72125 CT NECK SPINE W/O DYE: CPT | Mod: 26,MG

## 2022-04-19 PROCEDURE — 73523 X-RAY EXAM HIPS BI 5/> VIEWS: CPT | Mod: 26

## 2022-04-19 PROCEDURE — G1004: CPT

## 2022-04-19 PROCEDURE — 70450 CT HEAD/BRAIN W/O DYE: CPT | Mod: 26,MG

## 2022-04-19 PROCEDURE — 72131 CT LUMBAR SPINE W/O DYE: CPT | Mod: 26,MG

## 2022-04-19 RX ORDER — ENOXAPARIN SODIUM 100 MG/ML
40 INJECTION SUBCUTANEOUS EVERY 24 HOURS
Refills: 0 | Status: DISCONTINUED | OUTPATIENT
Start: 2022-04-20 | End: 2022-04-21

## 2022-04-19 RX ORDER — LOSARTAN POTASSIUM 100 MG/1
50 TABLET, FILM COATED ORAL DAILY
Refills: 0 | Status: DISCONTINUED | OUTPATIENT
Start: 2022-04-19 | End: 2022-04-19

## 2022-04-19 RX ORDER — ACETAMINOPHEN 500 MG
1000 TABLET ORAL ONCE
Refills: 0 | Status: COMPLETED | OUTPATIENT
Start: 2022-04-19 | End: 2022-04-19

## 2022-04-19 RX ADMIN — Medication 1000 MILLIGRAM(S): at 21:22

## 2022-04-19 RX ADMIN — Medication 1000 MILLIGRAM(S): at 20:10

## 2022-04-19 NOTE — ED ADULT NURSE NOTE - NSIMPLEMENTINTERV_GEN_ALL_ED
Implemented All Fall with Harm Risk Interventions:  [place] to call system. Call bell, personal items and telephone within reach. Instruct patient to call for assistance. Room bathroom lighting operational. Non-slip footwear when patient is off stretcher. Physically safe environment: no spills, clutter or unnecessary equipment. Stretcher in lowest position, wheels locked, appropriate side rails in place. Provide visual cue, wrist band, yellow gown, etc. Monitor gait and stability. Monitor for mental status changes and reorient to person, place, and time. Review medications for side effects contributing to fall risk. Reinforce activity limits and safety measures with patient and family. Provide visual clues: red socks.

## 2022-04-19 NOTE — ED ADULT NURSE REASSESSMENT NOTE - NS ED NURSE REASSESS COMMENT FT1
Patient for admit, back pain improved /sp Tylenol pO 1000mg.  Declines need to use bathroom at this time.  Vital signs stable.  Repositioned for comfort.  Fall precaution observed.

## 2022-04-19 NOTE — ED PROVIDER NOTE - NS ED ATTENDING STATEMENT MOD
I have seen and examined this patient and fully participated in the care of this patient as the teaching attending.  The service was shared with the PONCE.  I reviewed and verified the documentation and independently performed the documented:

## 2022-04-19 NOTE — ED PROVIDER NOTE - NS ED ROS FT
Constitutional: Denies dizziness, fevers, chills  	Eyes: Denies double vision, blurry vision, discharge  	ENMT: Denies pain, tinnitus, discharge, infections, neck pain, stiffness  	Cardiac: Denies CP, SOB, palpitations, edema  	Respiratory: Denies cough, respiratory distress, hemoptysis, hx of asthma, COPD  	GI: Denies nausea, vomiting, diarrhea, constipation  	: Denies dysuria, frequency or burning  	MS: + back pain, Denies muscle weakness, joint pain  	Neuro: Denies LOC, seizures, numbness/tingling  	Skin: Denies rash or lesion  Except as documented in HPI, all other ROS are negative

## 2022-04-19 NOTE — ED ADULT TRIAGE NOTE - CHIEF COMPLAINT QUOTE
Pt BIBEMS for evaluation of back pain s/p mechanical fall PTA. Pt states, "I was leaning down to feed my cat and I fell backwards." Pt denies head injury or LOC. Denies any other injury.

## 2022-04-19 NOTE — ED PROVIDER NOTE - CLINICAL SUMMARY MEDICAL DECISION MAKING FREE TEXT BOX
Pt BIBEMS complaining of back pain s/p mechanical fall. She endorses pain to her mid back with radiation to her neck and lower back. Denies hitting head, LOC, CP, SOB, numbness/tingling, bowel incontinence, no bladder incontinence out of the ordinary (pt is urine incontinent at baseline due to overactive bladder).  Labs/imaging reviewed and no evidence of compression fracture. Pt is aware of plan and in agreement. Pt BIBEMS complaining of back pain s/p mechanical fall. She endorses pain to her mid back with radiation to her neck and lower back. Denies hitting head, LOC, CP, SOB, numbness/tingling, bowel incontinence, no bladder incontinence out of the ordinary (pt is urine incontinent at baseline due to overactive bladder).  Labs/imaging reviewed and no evidence of compression fracture or hip fx. CT head, cervical and lumbar negative. VS wnml. LABs wnml. Pt is aware of plan and in agreement. Will admit for fall, unable to walk. Likely PT consult and possibly ODT. Pt unsafe dc due to fall.

## 2022-04-19 NOTE — ED ADULT NURSE REASSESSMENT NOTE - NS ED NURSE REASSESS COMMENT FT1
Patient a/oX 3, CT scan and Xray done.  BAck pain improved s/p ACetaminophen 1000mg PO.  REpositioned for comfort.  Fall precaution observed.  Results and disposition pending.

## 2022-04-19 NOTE — ED PROVIDER NOTE - OBJECTIVE STATEMENT
89 y/o F BIBEMS complaining of back pain s/p mechanical fall. Pt states she was feeding her cats, turned her head and fell down on her back. She was on the floor, pressed her alert button and waited for EMS to arrive. She endorses pain to her mid back with radiation to her neck and lower back. Denies hitting head, LOC, CP, SOB, numbness/tingling, bowel incontinence, no bladder incontinence out of the ordinary (pt is urine incontinent at baseline due to overactive bladder). 89 y/o F BIBEMS complaining of back pain s/p mechanical fall. Pt states she was feeding her cats, turned her head and fell down on her back. She was on the floor, pressed her alert button and waited for EMS to arrive. She endorses pain to her mid back with radiation to her neck and lower back. Denies hitting head, LOC, CP, SOB, numbness/tingling, bowel incontinence, no bladder incontinence out of the ordinary (pt is urine incontinent at baseline due to overactive bladder). She describes sharp 8/10 pain that worsens with movement. Has not been able to stand/walk after the fall due to the pain.

## 2022-04-19 NOTE — ED PROVIDER NOTE - PHYSICAL EXAMINATION
VITAL SIGNS: I have reviewed nursing notes and confirm.  		CONSTITUTIONAL: Non toxic, resting comfortably in stretcher; in no acute distress.   		SKIN:  Warm and dry, no acute rash, no lesions.   		HEAD:  Normocephalic, atraumatic.  		EYES: PERRL briskly 3mm b/l. EOM intact; conjunctiva and sclera clear.  		ENT: No nasal discharge; airway clear.   		NECK: Supple; no midline cervical spine pain/tenderness/stepoff/deformity.   		CARD: S1, S2 normal; no murmurs, gallops, or rubs. Regular rhythm + tachycardia.   		RESP:  Clear to auscultation b/l, no wheezes, rales or rhonchi.  		ABD: Normal bowel sounds; soft; non-distended; non-tender; no guarding/rebound.  		MSK: Exam limited due to pain, no muscle atrophy, pain upon palpation throughout back, ROM intact to b/l upper and lower extremities  		EXT: Normal ROM. No clubbing, cyanosis or edema. 2+ pulses to b/l ue/le.  		NEURO: Alert and oriented, grossly unremarkable.  5/5 strength x 4 extremities against gravity and external force.  Sensation intact and symmetric x 4 extremities.  PSYCH: Cooperative, mood and affect appropriate

## 2022-04-19 NOTE — ED ADULT NURSE REASSESSMENT NOTE - NS ED NURSE REASSESS COMMENT FT1
Patient a/oX3, c/o of back pain, Tylenol 1000mg PO adminsitered.  Vital signs stable.  Left FA PIV #20 in place,a ll labs sent, no complications.  CT scan pending.  Fall precaution observed.

## 2022-04-19 NOTE — ED ADULT NURSE NOTE - OBJECTIVE STATEMENT
s/p fall, states fell backwards, fell on back, denies hitting head, not on blood thinners, no numbness or tingling sensation, no neck tenderness.  Patient [place].

## 2022-04-19 NOTE — ED ADULT TRIAGE NOTE - HEART RATE (BEATS/MIN)
Patient's Mother stated she would like to discuss patient's appt. Today and next plan of care as it was a lot to take in and doesn't remember a lot of what was discussed. Please advise.    68

## 2022-04-20 ENCOUNTER — TRANSCRIPTION ENCOUNTER (OUTPATIENT)
Age: 87
End: 2022-04-20

## 2022-04-20 DIAGNOSIS — D75.89 OTHER SPECIFIED DISEASES OF BLOOD AND BLOOD-FORMING ORGANS: ICD-10-CM

## 2022-04-20 DIAGNOSIS — R29.6 REPEATED FALLS: ICD-10-CM

## 2022-04-20 DIAGNOSIS — R63.8 OTHER SYMPTOMS AND SIGNS CONCERNING FOOD AND FLUID INTAKE: ICD-10-CM

## 2022-04-20 DIAGNOSIS — C50.919 MALIGNANT NEOPLASM OF UNSPECIFIED SITE OF UNSPECIFIED FEMALE BREAST: ICD-10-CM

## 2022-04-20 DIAGNOSIS — M81.0 AGE-RELATED OSTEOPOROSIS WITHOUT CURRENT PATHOLOGICAL FRACTURE: ICD-10-CM

## 2022-04-20 DIAGNOSIS — M54.9 DORSALGIA, UNSPECIFIED: ICD-10-CM

## 2022-04-20 DIAGNOSIS — R33.9 RETENTION OF URINE, UNSPECIFIED: ICD-10-CM

## 2022-04-20 LAB
ALBUMIN SERPL ELPH-MCNC: 3.5 G/DL — SIGNIFICANT CHANGE UP (ref 3.3–5)
ALP SERPL-CCNC: 65 U/L — SIGNIFICANT CHANGE UP (ref 40–120)
ALT FLD-CCNC: 13 U/L — SIGNIFICANT CHANGE UP (ref 10–45)
ANION GAP SERPL CALC-SCNC: 10 MMOL/L — SIGNIFICANT CHANGE UP (ref 5–17)
AST SERPL-CCNC: 19 U/L — SIGNIFICANT CHANGE UP (ref 10–40)
BASOPHILS # BLD AUTO: 0.04 K/UL — SIGNIFICANT CHANGE UP (ref 0–0.2)
BASOPHILS NFR BLD AUTO: 0.7 % — SIGNIFICANT CHANGE UP (ref 0–2)
BILIRUB SERPL-MCNC: 0.4 MG/DL — SIGNIFICANT CHANGE UP (ref 0.2–1.2)
BUN SERPL-MCNC: 15 MG/DL — SIGNIFICANT CHANGE UP (ref 7–23)
CALCIUM SERPL-MCNC: 8.8 MG/DL — SIGNIFICANT CHANGE UP (ref 8.4–10.5)
CHLORIDE SERPL-SCNC: 105 MMOL/L — SIGNIFICANT CHANGE UP (ref 96–108)
CO2 SERPL-SCNC: 25 MMOL/L — SIGNIFICANT CHANGE UP (ref 22–31)
CREAT SERPL-MCNC: 0.72 MG/DL — SIGNIFICANT CHANGE UP (ref 0.5–1.3)
EGFR: 79 ML/MIN/1.73M2 — SIGNIFICANT CHANGE UP
EOSINOPHIL # BLD AUTO: 0.19 K/UL — SIGNIFICANT CHANGE UP (ref 0–0.5)
EOSINOPHIL NFR BLD AUTO: 3.3 % — SIGNIFICANT CHANGE UP (ref 0–6)
FOLATE SERPL-MCNC: 8.3 NG/ML — SIGNIFICANT CHANGE UP
GLUCOSE SERPL-MCNC: 89 MG/DL — SIGNIFICANT CHANGE UP (ref 70–99)
HCT VFR BLD CALC: 36.5 % — SIGNIFICANT CHANGE UP (ref 34.5–45)
HGB BLD-MCNC: 11.7 G/DL — SIGNIFICANT CHANGE UP (ref 11.5–15.5)
IMM GRANULOCYTES NFR BLD AUTO: 0.4 % — SIGNIFICANT CHANGE UP (ref 0–1.5)
LYMPHOCYTES # BLD AUTO: 1.06 K/UL — SIGNIFICANT CHANGE UP (ref 1–3.3)
LYMPHOCYTES # BLD AUTO: 18.6 % — SIGNIFICANT CHANGE UP (ref 13–44)
MAGNESIUM SERPL-MCNC: 2.2 MG/DL — SIGNIFICANT CHANGE UP (ref 1.6–2.6)
MCHC RBC-ENTMCNC: 32.1 GM/DL — SIGNIFICANT CHANGE UP (ref 32–36)
MCHC RBC-ENTMCNC: 32.7 PG — SIGNIFICANT CHANGE UP (ref 27–34)
MCV RBC AUTO: 102 FL — HIGH (ref 80–100)
MONOCYTES # BLD AUTO: 0.37 K/UL — SIGNIFICANT CHANGE UP (ref 0–0.9)
MONOCYTES NFR BLD AUTO: 6.5 % — SIGNIFICANT CHANGE UP (ref 2–14)
NEUTROPHILS # BLD AUTO: 4.02 K/UL — SIGNIFICANT CHANGE UP (ref 1.8–7.4)
NEUTROPHILS NFR BLD AUTO: 70.5 % — SIGNIFICANT CHANGE UP (ref 43–77)
NRBC # BLD: 0 /100 WBCS — SIGNIFICANT CHANGE UP (ref 0–0)
PHOSPHATE SERPL-MCNC: 3.1 MG/DL — SIGNIFICANT CHANGE UP (ref 2.5–4.5)
PLATELET # BLD AUTO: 221 K/UL — SIGNIFICANT CHANGE UP (ref 150–400)
POTASSIUM SERPL-MCNC: 4.2 MMOL/L — SIGNIFICANT CHANGE UP (ref 3.5–5.3)
POTASSIUM SERPL-SCNC: 4.2 MMOL/L — SIGNIFICANT CHANGE UP (ref 3.5–5.3)
PROT SERPL-MCNC: 6.6 G/DL — SIGNIFICANT CHANGE UP (ref 6–8.3)
RBC # BLD: 3.58 M/UL — LOW (ref 3.8–5.2)
RBC # FLD: 12.9 % — SIGNIFICANT CHANGE UP (ref 10.3–14.5)
SODIUM SERPL-SCNC: 140 MMOL/L — SIGNIFICANT CHANGE UP (ref 135–145)
VIT B12 SERPL-MCNC: 467 PG/ML — SIGNIFICANT CHANGE UP (ref 232–1245)
WBC # BLD: 5.7 K/UL — SIGNIFICANT CHANGE UP (ref 3.8–10.5)
WBC # FLD AUTO: 5.7 K/UL — SIGNIFICANT CHANGE UP (ref 3.8–10.5)

## 2022-04-20 PROCEDURE — 93010 ELECTROCARDIOGRAM REPORT: CPT

## 2022-04-20 PROCEDURE — 99223 1ST HOSP IP/OBS HIGH 75: CPT | Mod: GC

## 2022-04-20 RX ORDER — FLUOXETINE HCL 10 MG
0 CAPSULE ORAL
Qty: 0 | Refills: 0 | DISCHARGE

## 2022-04-20 RX ORDER — CHOLECALCIFEROL (VITAMIN D3) 125 MCG
400 CAPSULE ORAL DAILY
Refills: 0 | Status: DISCONTINUED | OUTPATIENT
Start: 2022-04-20 | End: 2022-04-21

## 2022-04-20 RX ORDER — ASPIRIN/CALCIUM CARB/MAGNESIUM 324 MG
81 TABLET ORAL DAILY
Refills: 0 | Status: DISCONTINUED | OUTPATIENT
Start: 2022-04-20 | End: 2022-04-21

## 2022-04-20 RX ORDER — ACETAMINOPHEN 500 MG
650 TABLET ORAL EVERY 6 HOURS
Refills: 0 | Status: DISCONTINUED | OUTPATIENT
Start: 2022-04-20 | End: 2022-04-21

## 2022-04-20 RX ORDER — CHOLECALCIFEROL (VITAMIN D3) 125 MCG
400 CAPSULE ORAL
Qty: 0 | Refills: 0 | DISCHARGE
Start: 2022-04-20

## 2022-04-20 RX ORDER — OXYCODONE HYDROCHLORIDE 5 MG/1
5 TABLET ORAL THREE TIMES A DAY
Refills: 0 | Status: DISCONTINUED | OUTPATIENT
Start: 2022-04-20 | End: 2022-04-21

## 2022-04-20 RX ORDER — POLYETHYLENE GLYCOL 3350 17 G/17G
17 POWDER, FOR SOLUTION ORAL DAILY
Refills: 0 | Status: DISCONTINUED | OUTPATIENT
Start: 2022-04-20 | End: 2022-04-21

## 2022-04-20 RX ORDER — OXYBUTYNIN CHLORIDE 5 MG
5 TABLET ORAL
Refills: 0 | Status: DISCONTINUED | OUTPATIENT
Start: 2022-04-20 | End: 2022-04-21

## 2022-04-20 RX ORDER — ACETAMINOPHEN 500 MG
650 TABLET ORAL EVERY 6 HOURS
Refills: 0 | Status: DISCONTINUED | OUTPATIENT
Start: 2022-04-20 | End: 2022-04-20

## 2022-04-20 RX ADMIN — OXYCODONE HYDROCHLORIDE 5 MILLIGRAM(S): 5 TABLET ORAL at 03:28

## 2022-04-20 RX ADMIN — Medication 650 MILLIGRAM(S): at 12:31

## 2022-04-20 RX ADMIN — Medication 5 MILLIGRAM(S): at 06:25

## 2022-04-20 RX ADMIN — OXYCODONE HYDROCHLORIDE 5 MILLIGRAM(S): 5 TABLET ORAL at 21:36

## 2022-04-20 RX ADMIN — Medication 400 UNIT(S): at 12:30

## 2022-04-20 RX ADMIN — POLYETHYLENE GLYCOL 3350 17 GRAM(S): 17 POWDER, FOR SOLUTION ORAL at 18:39

## 2022-04-20 RX ADMIN — ENOXAPARIN SODIUM 40 MILLIGRAM(S): 100 INJECTION SUBCUTANEOUS at 03:29

## 2022-04-20 RX ADMIN — OXYCODONE HYDROCHLORIDE 5 MILLIGRAM(S): 5 TABLET ORAL at 04:15

## 2022-04-20 RX ADMIN — Medication 650 MILLIGRAM(S): at 18:36

## 2022-04-20 RX ADMIN — Medication 81 MILLIGRAM(S): at 18:37

## 2022-04-20 RX ADMIN — Medication 5 MILLIGRAM(S): at 18:36

## 2022-04-20 RX ADMIN — OXYCODONE HYDROCHLORIDE 5 MILLIGRAM(S): 5 TABLET ORAL at 07:36

## 2022-04-20 RX ADMIN — Medication 650 MILLIGRAM(S): at 13:30

## 2022-04-20 RX ADMIN — OXYCODONE HYDROCHLORIDE 5 MILLIGRAM(S): 5 TABLET ORAL at 23:16

## 2022-04-20 RX ADMIN — OXYCODONE HYDROCHLORIDE 5 MILLIGRAM(S): 5 TABLET ORAL at 06:51

## 2022-04-20 NOTE — DISCHARGE NOTE PROVIDER - CARE PROVIDER_API CALL
Deven Stevens)  Orthopedics  [address] [address]  Phone: [phone]  Fax: [phone]  Scheduled Appointment: 04/27/2022 09:00 AM

## 2022-04-20 NOTE — H&P ADULT - NSHPLABSRESULTS_GEN_ALL_CORE
LABS:                         11.8   7.41  )-----------( 229      ( 19 Apr 2022 20:08 )             36.2     04-19    139  |  104  |  21  ----------------------------<  99  4.4   |  26  |  0.73    Ca    9.1      19 Apr 2022 20:08    TPro  6.7  /  Alb  3.7  /  TBili  0.2  /  DBili  x   /  AST  22  /  ALT  14  /  AlkPhos  62  04-19      RADIOLOGY, EKG & ADDITIONAL TESTS: Reviewed.   As stated above.

## 2022-04-20 NOTE — PHYSICAL THERAPY INITIAL EVALUATION ADULT - GAIT DEVIATIONS NOTED, PT EVAL
decreased lisa/increased time in double stance/decreased step length/decreased stride length/increased stride width/decreased weight-shifting ability

## 2022-04-20 NOTE — H&P ADULT - NSICDXPASTMEDICALHX_GEN_ALL_CORE_FT
PAST MEDICAL HISTORY:  Back pain     Breast CA     Macrocytosis     Multiple falls     Osteoporosis     Urinary retention

## 2022-04-20 NOTE — DISCHARGE NOTE PROVIDER - HOSPITAL COURSE
#Discharge: do not delete    Patient is  yo M/F with past medical history of _____ presented with _____, found to have _____ (one liner)    Hospital course (by problem):     Patient was discharged to: (home/DOT/acute rehab/hospice, etc, and with what services – home health PT/RN? Home O2?)    New medications:   Changes to old medications:  Medications that were stopped:    Items to follow up as outpatient:    Physical exam at the time of discharge:       #Discharge: do not delete    Patient is  a 89 yo F with past medical history of urinary retention/overactive bladder, breast cancer (s/p R mastectomy ~15 years ago), and osteoporosis, who presented with back pain secondary to mechanical fall, found to have normal CXR, no acute fracture, hemorrhage, or soft tissue swelling on CT head, CT lumbar spine, or CT cervical spine. EKG showed normal sinus rhythm. Back pain is currently well controlled. Patient reports she has home health aide daily 8AM-8PM since 2 months ago.     Hospital course:  Problem 1: Multiple fractures  -CT head with no acute abnormalities  -CT lumbar spine showed no acute fracture of the visualized osseous structures  -CT cervical spine showed no acute fracture or malalignment and moderate cervical spondylosis most prominent at C3-C4  -EKG showed NSR in ED, no new ischemic changes    Problem 2: Back pain  -Given acetaminophen 1000 mg PO in ED   -Acetaminophen 650 mg PO q6hr  -Oxycodone 5 mg PO TID PRN for severe pain    Problem 3: Breast CA  -History of breast cancer, s/p mastectomy of R breast   -Home medications verified with pt pharmacy ([facility] ) - patient not currently on anastrazole    Problem 4: Urinary retention/overactive bladder  -Home medication Detrol 4 mg QD verified  -Patient received oxybutynin 5 mg PO BID in exchange for home Detrol  -Pt voiding appropriately, no signs/symptoms of urinary retention in hospital    Problem 5: Osteoporosis  -Continued to receive home vitamin D 400 U    Problem 6: Macrocytosis  -.6 on admission,  on 4/20 morning  -pending B12 and folate level  -f/u outpatient    Problem 7: Nutrition, Metabolism, and Developmental Symptoms  -Fluids: none, pt tolerating PO hydration  -Electrolytes: keep K>4, Mg>2  -Nutrition: regular diet  -DVT ppx: Lovenox 40 mg subQ QD  GI ppx: Miralax 17 g daily    Patient was discharged to: home with transportation    New medications: None  Changes to old medications: None  Medications that were stopped: None    Items to follow up as outpatient:  -B12 and folate level followup - supplement as necessary  -PT recommendations    Physical exam at the time of discharge:  General: lying comfortably in bed, NAD  HEENT: PERRL, EOMI, MMM, clear conjunctiva, no JVD or thyromegaly  Respiratory: CTA b/l, no W/R/R  Cardiac: Normal S1/S2, RRR, no M/R/G  GI: soft, NT/ND, no rebound/guarding, normoactive BSx4  Back: spine midline, no tenderness to palpation, no step-offs, no costovertebral angle tenderness, no bruises or skin changes noted  Ext: WWP, no clubbing, cyanosis, or peripheral edema  MSK: NROM, LE ROM limited by pain, no joint swelling, tenderness or erythema  Vasc: 2+ pulses in radial, DP, PT, popliteal bilaterally  Neuro: AAOx3, hard of hearing, CN II-XII intact, no focal deficits, LE strength 4/5 bilaterally, UE strength 4+/5 bilaterally, good  strength, sensation intact throughout          #Discharge: do not delete    Patient is  a 89 yo F with past medical history of urinary retention/overactive bladder, breast cancer (s/p R mastectomy ~15 years ago), and osteoporosis, who presented with back pain secondary to mechanical fall, found to have normal CXR, no acute fracture, hemorrhage, or soft tissue swelling on CT head, CT lumbar spine, or CT cervical spine. EKG showed normal sinus rhythm. Back pain is currently well controlled. Patient reports she has home health aide daily 8AM-8PM since 2 months ago.     Hospital course:    #Multiple falls: CT head with no acute abnormalities, CT lumbar spine showed no acute fracture of the visualized osseous structures. CT cervical spine showed no acute fracture or malalignment and moderate cervical spondylosis most prominent at C3-C4. Bilateral hip xray negative for fractures. EKG showed NSR in ED, no new ischemic changes    #Back pain: Pain well controlled with Acetaminophen 650 mg PO q6hr and Oxycodone 5 mg PO TID PRN for severe pain. C/w acetaminophen     #Breast CA: History of breast cancer, s/p mastectomy of R breast. Home medications verified with pt pharmacy ([facility] ) - patient not currently on anastrazole. f/u outpatient    #Urinary retention/overactive bladder: Home medication Detrol 4 mg QD verified. Patient received oxybutynin 5 mg PO BID in exchange for home Detrol. Pt voiding appropriately, no signs/symptoms of urinary retention in hospital.    #Osteoporosis: C/w vitamin D 400 U    #Macrocytosis: .6 on admission,  on 4/20 morning. pending B12 and folate level. f/u outpatient    Patient was discharged to: home with transportation    New medications: None  Changes to old medications: None  Medications that were stopped: None    Items to follow up as outpatient: f/u with PCP for B12 and folate level followup - supplement as necessary    Physical exam at the time of discharge:  General: lying comfortably in bed, NAD  HEENT: PERRL, EOMI, MMM, clear conjunctiva, no JVD or thyromegaly  Respiratory: CTA b/l, no W/R/R  Cardiac: Normal S1/S2, RRR, no M/R/G  GI: soft, NT/ND, no rebound/guarding, normoactive BSx4  Back: spine midline, no tenderness to palpation, no step-offs, no costovertebral angle tenderness, no bruises or skin changes noted  Ext: WWP, no clubbing, cyanosis, or peripheral edema  MSK: NROM, LE ROM limited by pain, no joint swelling, tenderness or erythema  Vasc: 2+ pulses in radial, DP, PT, popliteal bilaterally  Neuro: AAOx3, hard of hearing, CN II-XII intact, no focal deficits, LE strength 4/5 bilaterally, UE strength 4+/5 bilaterally, good  strength, sensation intact throughout          #Discharge: do not delete    Patient is  a 89 yo F with past medical history of urinary retention/overactive bladder, breast cancer (s/p R mastectomy ~15 years ago), and osteoporosis, who presented with back pain secondary to mechanical fall, found to have normal CXR, no acute fracture, hemorrhage, or soft tissue swelling on CT head, CT lumbar spine, or CT cervical spine. EKG showed normal sinus rhythm. Back pain is currently well controlled. Patient reports she has home health aide daily 8AM-8PM since 2 months ago.     Hospital course:    #Multiple falls: CT head with no acute abnormalities, CT lumbar spine showed no acute fracture of the visualized osseous structures. CT cervical spine showed no acute fracture or malalignment and moderate cervical spondylosis most prominent at C3-C4. Bilateral hip xray negative for fractures. EKG showed NSR in ED, no new ischemic changes.    #Back pain: Pain well controlled with Acetaminophen 650 mg PO q6hr and Oxycodone 5 mg PO TID PRN for severe pain. C/w acetaminophen for pain control.     #Breast CA: History of breast cancer, s/p mastectomy of R breast. Home medications verified with pt [facility] ([facility] ) - patient not currently on anastrazole. f/u outpatient    #Urinary retention/overactive bladder: Home medication Detrol 4 mg QD verified. Patient received oxybutynin 5 mg PO BID in exchange for home Detrol. Pt voiding appropriately, no signs/symptoms of urinary retention in hospital.    #Osteoporosis: C/w vitamin D 400 U    #Macrocytosis: .6 on admission,  on 4/20 morning. B12 & folate WNL.     Patient was discharged to: home with transportation    New medications: None  Changes to old medications: None  Medications that were stopped: None    Items to follow up as outpatient: f/u with PCP for B12 and folate level followup - supplement as necessary    Physical exam at the time of discharge:  General: lying comfortably in bed, NAD  HEENT: PERRL, EOMI, MMM, clear conjunctiva, no JVD or thyromegaly  Respiratory: CTA b/l, no W/R/R  Cardiac: Normal S1/S2, RRR, no M/R/G  GI: soft, NT/ND, no rebound/guarding, normoactive BSx4  Back: spine midline, no tenderness to palpation, no step-offs, no costovertebral angle tenderness, no bruises or skin changes noted  Ext: WWP, no clubbing, cyanosis, or peripheral edema  MSK: NROM, LE ROM limited by pain, no joint swelling, tenderness or erythema  Vasc: 2+ pulses in radial, DP, PT, popliteal bilaterally  Neuro: AAOx3, hard of hearing, CN II-XII intact, no focal deficits, LE strength 4/5 bilaterally, UE strength 4+/5 bilaterally, good  strength, sensation intact throughout          #Discharge: do not delete    Patient is  a 89 yo F with past medical history of urinary retention/overactive bladder, breast cancer (s/p R mastectomy ~15 years ago), and osteoporosis, who presented with back pain secondary to mechanical fall, found to have normal CXR, no acute fracture, hemorrhage, or soft tissue swelling on CT head, CT lumbar spine, or CT cervical spine. EKG showed normal sinus rhythm. Back pain is currently well controlled. Patient reports she has home health aide daily 8AM-8PM since 2 months ago.     Hospital course:    #Multiple falls: CT head with no acute abnormalities, CT lumbar spine showed no acute fracture of the visualized osseous structures. CT cervical spine showed no acute fracture or malalignment and moderate cervical spondylosis most prominent at C3-C4. Bilateral hip xray negative for fractures. EKG showed NSR in ED, no new ischemic changes.    #Back pain: Pain well controlled with Acetaminophen 650 mg PO q6hr and Oxycodone 5 mg PO TID PRN for severe pain. C/w acetaminophen for pain control.     #Breast CA: History of breast cancer, s/p mastectomy of R breast. Home medications verified with pt pharmacy ([facility] ) - patient not currently on anastrazole. f/u outpatient    #Urinary retention/overactive bladder: Home medication Detrol 4 mg QD verified. Patient received oxybutynin 5 mg PO BID in exchange for home Detrol. Pt voiding appropriately, no signs/symptoms of urinary retention in hospital.    #Osteoporosis: C/w vitamin D 400 U    #Macrocytosis: .6 on admission,  on 4/20 morning. B12 & folate WNL.     Patient was discharged to: home with [facility]    New medications: None  Changes to old medications: None  Medications that were stopped: None    Items to follow up as outpatient: f/u with PCP for B12 and folate level followup - supplement as necessary    Physical exam at the time of discharge:  General: lying comfortably in bed, NAD  HEENT: PERRL, EOMI, MMM, clear conjunctiva, no JVD or thyromegaly  Respiratory: CTA b/l, no W/R/R  Cardiac: Normal S1/S2, RRR, no M/R/G  GI: soft, NT/ND, no rebound/guarding, normoactive BSx4  Back: spine midline, no tenderness to palpation, no step-offs, no costovertebral angle tenderness, no bruises or skin changes noted  Ext: WWP, no clubbing, cyanosis, or peripheral edema  MSK: NROM, LE ROM limited by pain, no joint swelling, tenderness or erythema  Vasc: 2+ pulses in radial, DP, PT, popliteal bilaterally  Neuro: AAOx3, hard of hearing, CN II-XII intact, no focal deficits, LE strength 4/5 bilaterally, UE strength 4+/5 bilaterally, good  strength, sensation intact throughout

## 2022-04-20 NOTE — H&P ADULT - ATTENDING COMMENTS
#Back pain: s/p mechanical fall at home. +mid/left lumbar tenderness. CTH wnl, CT cspine/lumbar w/ degenerative changes, no compression fx; Pelvix Xray w/o fracture. No neurologic symptoms, slight decreased ROM LLE due to pain, no bowel incontince, incontinent of urine at baseline. F/up final read, c/w pain control as above, PT/SW #Fall: described as mechanical. No LOC/syncope. No FND, CTH wnl. EKG nsr, nonischemic. F/up tsh, b12/folate, rpr, P/SW.

## 2022-04-20 NOTE — H&P ADULT - PROBLEM SELECTOR PLAN 1
#Status-post mechanical fall:   Patient describes that she tripped and fell when feeding her cat. Denies any head trauma or LOC. Denies any CP or palpitations. Patient states that she lives at home alone, has a , no HHA.  - f/u EKG (pending)   - CT head with no acute abnormalities  - CT lumbar spine showed no acute fracture of the visualized osseous structures  - CT cervical spine showed no acute fracture or malalignment and moderate cervical spondylosis, most prominent at the level of C3-C4   - PT evaluation in the AM #Status-post mechanical fall:   Patient describes that she tripped and fell when feeding her cat. Denies any head trauma or LOC. Denies any CP or palpitations. Patient states that she lives at home alone, has a , no HHA.  - EKG showed NSR in ED, no new ischemic changes   - CT head with no acute abnormalities  - CT lumbar spine showed no acute fracture of the visualized osseous structures  - CT cervical spine showed no acute fracture or malalignment and moderate cervical spondylosis, most prominent at the level of C3-C4   - PT evaluation in the AM

## 2022-04-20 NOTE — PATIENT PROFILE ADULT - FALL HARM RISK - HARM RISK INTERVENTIONS

## 2022-04-20 NOTE — PHYSICAL THERAPY INITIAL EVALUATION ADULT - IMPAIRMENTS FOUND, PT EVAL
aerobic capacity/endurance/ergonomics and body mechanics/fine motor/gait, locomotion, and balance/gross motor/muscle strength/posture/ROM/ventilation and respiration/gas exchange

## 2022-04-20 NOTE — PATIENT PROFILE ADULT - VISION (WITH CORRECTIVE LENSES IF THE PATIENT USUALLY WEARS THEM):
arrives with pair of eyeglasses/Normal vision: sees adequately in most situations; can see medication labels, newsprint

## 2022-04-20 NOTE — PATIENT PROFILE ADULT - ABILITY TO HEAR (WITH HEARING AID OR HEARING APPLIANCE IF NORMALLY USED):
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

## 2022-04-20 NOTE — H&P ADULT - PROBLEM SELECTOR PLAN 4
#Urinary retention:   History of urinary retention and overactive bladder, for which patient takes Detrol. Patient states that she has been incontinent for 2 years and wears diapers.   - continue with oxybutynin 5 mg PO BID in exchange for home Detrol   - verify home medications in AM (pharmacy closed)   - monitor UOP and bladder scan if retaining

## 2022-04-20 NOTE — H&P ADULT - PROBLEM SELECTOR PLAN 2
#Pain control:   Currently complaining of back pain (worse on left side) s/p fall.   - continue with Tylenol 650 mg PO q6 for mild pain   - continue with Oxycodone 5 mg PO TID for severe pain   - can give lidocaine patches PRN

## 2022-04-20 NOTE — CONSULT NOTE ADULT - SUBJECTIVE AND OBJECTIVE BOX
Patient is a 89y old  Female who presents with a chief complaint of Here for mechanical fall at home (20 Apr 2022 02:55)        HPI:  HPI:   89F with PMH of urinary retention/overactive bladder, breast cancer (s/p right mastectomy), hard of hearing, and osteoporosis, BIBEMS for back pain s/p mechanical fall. Patient very hard of hearing and states that she does not feel like answering questions at this time. Does describe that she has had multiple falls in the past year. She says that she was feeding her cat when she tripped and fell down on her back. Denies hitting her head or any LOC. Says that she landed on her back and does have some residual pain there. States that she pressed her life alert button and waited for EMS to arrive, so was not on the ground for very long. Currently has no acute complaints aside from back pain, which is 8/10, sharp, and worse with movement. Feels that her LE strength is not diminished but that it is currently limited by pain. Has been incontinent for 2 years, for which she wears diapers at home. Denies n/v/d/c, fever, chills, SOB, CP, and dysuria. Lives alone but does have a  who helps her. No HHA.    ED COURSE:   Vitals: temp 97.4, HR 72, /84, RR 16, sat 98%   Labs: WBC 7.41, H/H 11.8/36.2, .6, plt 229, Na 139, K 4.4, Cl 104, bicarb 26, Cr 0.73, alk phos, AST/ALT all WNL, COVID neg   Imaging: CXR normal, CT head with no acute abnormalities, CT lumbar spine: no acute fracture of the visualized osseous structures, CT cervical spine: showed no acute fracture or malalignment and moderate cervical spondylosis, most prominent at the level of C3-C4. EKG showed NSR in ED.   Interventions: given Tylenol 1000 mg PO x1    (20 Apr 2022 02:55)      PAST MEDICAL & SURGICAL HISTORY:  Breast CA    Multiple falls    Back pain    Urinary retention    Osteoporosis    Macrocytosis        MEDICATIONS  (STANDING):  aspirin  chewable 81 milliGRAM(s) Oral daily  cholecalciferol 400 Unit(s) Oral daily  enoxaparin Injectable 40 milliGRAM(s) SubCutaneous every 24 hours  oxybutynin 5 milliGRAM(s) Oral two times a day    MEDICATIONS  (PRN):  acetaminophen     Tablet .. 650 milliGRAM(s) Oral every 6 hours PRN Mild Pain (1 - 3)  oxyCODONE    IR 5 milliGRAM(s) Oral three times a day PRN Severe Pain (7 - 10)      FAMILY HISTORY:    CBC Full  -  ( 20 Apr 2022 06:45 )  WBC Count : 5.70 K/uL  RBC Count : 3.58 M/uL  Hemoglobin : 11.7 g/dL  Hematocrit : 36.5 %  Platelet Count - Automated : 221 K/uL  Mean Cell Volume : 102.0 fl  Mean Cell Hemoglobin : 32.7 pg  Mean Cell Hemoglobin Concentration : 32.1 gm/dL  Auto Neutrophil # : 4.02 K/uL  Auto Lymphocyte # : 1.06 K/uL  Auto Monocyte # : 0.37 K/uL  Auto Eosinophil # : 0.19 K/uL  Auto Basophil # : 0.04 K/uL  Auto Neutrophil % : 70.5 %  Auto Lymphocyte % : 18.6 %  Auto Monocyte % : 6.5 %  Auto Eosinophil % : 3.3 %  Auto Basophil % : 0.7 %      04-20    140  |  105  |  15  ----------------------------<  89  4.2   |  25  |  0.72    Ca    8.8      20 Apr 2022 06:42  Phos  3.1     04-20  Mg     2.2     04-20    TPro  6.6  /  Alb  3.5  /  TBili  0.4  /  DBili  x   /  AST  19  /  ALT  13  /  AlkPhos  65  04-20            Radiology:    < from: Xray Chest 1 View AP/PA (04.19.22 @ 20:29) >  ACC: [id number] EXAM:  XR CHEST AP OR PA 1V                          PROCEDURE DATE:  04/19/2022          INTERPRETATION:  TECHNIQUE: Single portable view of the chest.    COMPARISON:  12/27/2021    CLINICAL HISTORY: eval heart and lungs    FINDINGS:    Single frontal view of the chest demonstrates the lungs to be clear. The   cardiomediastinal silhouette is normal. No acute osseous abnormalities.    IMPRESSION: No acute cardiopulmonary disease process.      < from: Xray Hip w/ Pelvis Min 5 Views, Bilateral (04.19.22 @ 20:30) >  ACC: [id number] EXAM:  XR HIPS BI WITH PELV MIN 5V                          PROCEDURE DATE:  04/19/2022          INTERPRETATION:  Clinical history/reason for exam: Trauma.    5 views/right and left    COMPARISON: December 27, 2021.    Findings/  impression: No acute fracture or dislocation. Bilateral pelvic rami old   fractures. Bilateral hip mild degenerative changes. Lower lumbar spine   degenerative changes.      < from: CT Head No Cont (04.19.22 @ 20:52) >    ACC: [id number] EXAM:  CT BRAIN                          PROCEDURE DATE:  04/19/2022          INTERPRETATION:  Angelia VO MD, have reviewed the images and the report   and agree with the findings with the additional modification: No acute   intracranial injury. There is an approximately 1 cm extra-axial   calcification along the right frontal convexity likely represents a   calcified meningioma.    cm PROCEDURE: CT head without intravenous contrast    INDICATION: Fall.    TECHNIQUE:  Serial axial images were obtained from the skull base to the   vertex without the use of intravenous contrast. Coronal and sagittal   reconstructions were created.    COMPARISON EXAMINATION: CT head dated 12/27/2021.    FINDINGS:  VENTRICLES AND SULCI: The ventricles and sulci are prominent consistent   with age-related parenchymal volume loss. No hydrocephalus.  EXTRA-AXIAL: No extra-axial fluid collection is present.  INTRA-AXIAL: No space-occupying intraparenchymal mass, hemorrhage, or   midline shift ispresent. No acute transcortical infarction identified.   Patchy periventricular white matter hypodensities consistent with   microvascular disease.  VISUALIZED ORBITS: Native lenses absent bilaterally.  VISUALIZED SINUSES: No air-fluid levels.  VISUALIZED MASTOIDS: Well aerated.  CALVARIUM: No fracture. Sclerotic lesion extending from the inner table   of the right parietal bone, likely osteoma.    IMPRESSION:    No acute intracranial hemorrhage or calvarial fracture.      < from: CT Cervical Spine No Cont (04.19.22 @ 20:53) >    ACC: [id number] EXAM:  CT CERVICAL SPINE                          PROCEDURE DATE:  04/19/2022          INTERPRETATION:  CERVICAL SPINE CT WITHOUT CONTRAST dated 4/19/2022 8:53   PM    CLINICAL STATEMENT: Fall.    TECHNIQUE: Contiguous noncontrast transaxial CT images were obtained   through the cervical spine. Reconstructions were then created in the   axial, sagittal, and coronal planes.    COMPARISON: CT cervical spine dated 12/27/2021.    FINDINGS:    Levocurvature of the cervical spine. No spondylolisthesis. No acute   fracture is identified. The vertebral body heights are grossly   maintained. Multilevel disc space narrowing throughout the cervical   spine. No prevertebral soft tissue swelling is demonstrated.    No large disc herniations or spinal canal stenosis. Uncovertebral   hypertrophy contributing to multilevel neural foraminal narrowing, most   prominent at the levels of C3-C4 bilaterally and C5-C6 on the right.    IMPRESSION:    1.  No acute fracture or malalignment.  2.  Moderate cervical spondylosis, most prominent at the level of C3-C4.      < from: CT Lumbar Spine No Cont (04.19.22 @ 20:53) >  ACC: [id number] EXAM:  CT LUMBAR SPINE                          PROCEDURE DATE:  04/19/2022          INTERPRETATION:  PROCEDURE: CT Lumbar spine without contrast    INDICATION: Fall.    TECHNIQUE:  Multiple axial sections were obtained from the thoracolumbar   junction through the sacrum. Sagittal and coronal reformats were obtained   from the axial data set. The images were reviewed in soft tissue and bone   windows.    COMPARISON: CT lumbar spine dated 12/27/2021.    FINDINGS: There are five non-rib-bearing lumbar vertebrae.   Dextrocurvature of the lumbar spine. No spondylolisthesis. No interval   change in severe chronic compression fractures of the T12 and L1   vertebral bodies with bony retropulsion contributing to moderate spinal   canal stenosis at the level of T11-T12 and mild spinal canal stenosis at   the level of T12-L1. No interval change in additional chronic compression   fractures of the L3 and L4 vertebral bodies. There is no new fracture of   the visualized osseous structures. Generalized osteopenia.      At the L1/2 level, there is no spinal canal stenosis. Posterior osseous   ridging contributing to mild left neural foraminal narrowing. No right   neural foraminal narrowing     At the L2/3 level, there is no spinalcanal stenosis. Posterior osseous   ridging contributing to mild left neural foraminal narrowing. No right   neural foraminal narrowing.    At the L3/4 level, there is no spinal canal stenosis or neural foraminal   narrowing.    At the L4/5 level, there is a disc bulge and ligamentum flavum   hypertrophy contributing to mild spinal canal stenosis and moderate   neural foraminal narrowing bilaterally.    At the L5/S1 level, there is a disc bulge without spinal canal stenosis   or neural foraminal narrowing.    IMPRESSION: No acute fracture of the visualized osseous structures. Since   12/27/2021, there has been no significant interval change.        Vital Signs Last 24 Hrs  T(C): 36.4 (20 Apr 2022 06:09), Max: 36.6 (19 Apr 2022 18:56)  T(F): 97.5 (20 Apr 2022 06:09), Max: 97.8 (19 Apr 2022 18:56)  HR: 62 (20 Apr 2022 06:36) (62 - 74)  BP: 164/77 (20 Apr 2022 06:36) (152/84 - 171/81)  BP(mean): --  RR: 16 (20 Apr 2022 06:36) (16 - 18)  SpO2: 96% (20 Apr 2022 06:36) (96% - 100%)        REVIEW OF SYSTEMS: per HPI, s/p fall at home, back pain            Physical Exam:  89 yo  woman  lying in semi Martin's position, awake, alert, hard of hearing,  no acute complaints of pain    Head: normocephalic, atraumatic    Eyes: PERRLA, EOMI, no nystagmus, sclera anicteric    ENT: nasal discharge, uvula midline, no oropharyngeal erythema/exudate    Neck: supple, negative JVD, negative carotid bruits, no thyromegaly    Chest: CTA bilaterally, neg wheeze/ rhonchi/ rales/ crackles/ egophany    Cardiovascular: regular rate and rhythm, neg murmurs/rubs/gallops    Abdomen: soft, non distended, non tender to palpation in all 4 quadrants, negative rebound/guarding, normal bowel sounds    Extremities: WWP, neg cyanosis/clubbing/edema, negative calf tenderness to palpation, negative Jessica's sign    Neurologic Exam:    Alert and oriented x 3     Motor Exam:    Right UE:             > 3+/5 throughout                               Left UE:               > 3+/5 throughout    Right LE:             > 3+/5 throughout    Left LE:               > 3+/5 throughout    Sensation:         intact to light touch x 4 extremities                                              DTR:                     biceps/brachioradialis: equal                                              patella/ankle: equal                         Gait:  not tested              PM&R Impression:    1) s/p mechanical fall at home  2) no focal weakness    Recommendations/ Plan :    1) Physical / Occupational therapy focusing on therapeutic exercises, bed mobility/transfer out of bed evaluation, progressive ambulation with assistive devices prn.    2) Anticipated Disposition Plan/Recs:    pending functional progress

## 2022-04-20 NOTE — PROGRESS NOTE ADULT - PROBLEM SELECTOR PLAN 4
#Urinary retention:   History of urinary retention and overactive bladder, for which patient takes Detrol. Patient states that she has been incontinent for 2 years and wears diapers.   - continue with oxybutynin 5 mg PO BID in exchange for home Detrol   - monitor UOP and bladder scan if retaining

## 2022-04-20 NOTE — H&P ADULT - NSHPSOCIALHISTORY_GEN_ALL_CORE
Denies drug use, ETOH use, or smoking. Lives alone, no children. Has nieces and nephews in [place]. No HHA. Has a  who helps her.

## 2022-04-20 NOTE — CONSULT NOTE ADULT - ASSESSMENT
per Internal Medicine    89 y o F with PMH of urinary retention/overactive bladder, breast cancer (s/p right mastectomy), hard of hearing, and osteoporosis, BIBEMS for back pain s/p mechanical fall at home.     Problem/Plan - 1:  ·  Problem: Multiple falls.   ·  Plan: #Status-post mechanical fall:   Patient describes that she tripped and fell when feeding her cat. Denies any head trauma or LOC. Denies any CP or palpitations. Patient states that she lives at home alone, has a , no HHA.  - EKG showed NSR in ED, no new ischemic changes   - CT head with no acute abnormalities  - CT lumbar spine showed no acute fracture of the visualized osseous structures  - CT cervical spine showed no acute fracture or malalignment and moderate cervical spondylosis, most prominent at the level of C3-C4   - PT evaluation in the AM.    Problem/Plan - 2:  ·  Problem: Back pain.   ·  Plan: #Pain control:   Currently complaining of back pain (worse on left side) s/p fall.   - continue with Tylenol 650 mg PO q6 for mild pain   - continue with Oxycodone 5 mg PO TID for severe pain   - can give lidocaine patches PRN.    Problem/Plan - 3:  ·  Problem: Breast CA.   ·  Plan: History of breast cancer, s/p mastectomy of R breast.   - obtain collateral from PCP (Dr. Ike Gardiner)  - verify home medications in AM (pharmacy closed) and if patient is on anastrazole.    Problem/Plan - 4:  ·  Problem: Urinary retention.   ·  Plan: #Urinary retention:   History of urinary retention and overactive bladder, for which patient takes Detrol. Patient states that she has been incontinent for 2 years and wears diapers.   - continue with oxybutynin 5 mg PO BID in exchange for home Detrol   - verify home medications in AM (pharmacy closed)   - monitor UOP and bladder scan if retaining.    Problem/Plan - 5:  ·  Problem: Osteoporosis.   ·  Plan: #Osteoporosis:   History of osteoporosis. Takes vitamin D and calcium at home.  - continue with vitamin d supplementation  - verify home medications in AM (pharmacy closed).    Problem/Plan - 6:  ·  Problem: Macrocytosis.   ·  Plan: #Macrocytosis:   .6.   - f/u B12 and folate.    Problem/Plan - 7:  ·  Problem: Nutrition, metabolism, and development symptoms.   ·  Plan: Nutrition and metabolism:   F: none   E: keep K>4, Mg>2   N: regular diet     DVT ppx: Lovenox   GI ppx: none.

## 2022-04-20 NOTE — PROGRESS NOTE ADULT - PROBLEM SELECTOR PLAN 5
#Osteoporosis:   History of osteoporosis. Takes vitamin D and calcium at home.  - continue with vitamin d supplementation

## 2022-04-20 NOTE — PHYSICAL THERAPY INITIAL EVALUATION ADULT - PLANNED THERAPY INTERVENTIONS, PT EVAL
balance training/bed mobility training/gait training/lumbar stabilization/manual therapy techniques/neuromuscular re-education/postural re-education/strengthening/stretching/transfer training

## 2022-04-20 NOTE — PHYSICAL THERAPY INITIAL EVALUATION ADULT - ADDITIONAL COMMENTS
Pt lives alone in elevator apt building. Pt had home aide 24/7 but recently was limited to 12hrs/day. Pt states she did not lose consciousness and just lost balance posteriorly - which happens often for her.

## 2022-04-20 NOTE — DISCHARGE NOTE PROVIDER - NSDCCPCAREPLAN_GEN_ALL_CORE_FT
PRINCIPAL DISCHARGE DIAGNOSIS  Diagnosis: Fall  Assessment and Plan of Treatment: You were evaluated following a mechanical fall. Chest X-ray, CT scans of your cervical and lumbar spinal cord, and EKG showed no abnormalities. No fractures or bleeding were noted on imaging. Your risk of falling increases as you grow older. Certain medications, such as those used for depression, sleeping problems, or urinary problems can also increase the risk of falling.   Things you can do on your own to keep from falling include:   -continue to take vitamin D pills. Vitamin D helps make bones and muscles stronger.   -Use a walker at home. Use a sturdy seat for the shower, non-slip bath mats, and hand rails for the stairs.   -Wear sturdy shoes that fit well and that are non-slip. Walking around in bare feet or only socks can also increase your risk of falling.   -Make your home safer - get rid of things that might make you trip or slip, including electrical cords, furniture, and loose rugs. Keep your home well-lit so you can see where you are going.  -If you worry that you could fall, use your life alert button to call for help if you fall and can't get up.      SECONDARY DISCHARGE DIAGNOSES  Diagnosis: Back pain  Assessment and Plan of Treatment: You were given Tylenol and oxycodone 5 mg as needed for your back pain while in the hospital. Oxycodone is very effective in controlling pain, but it can lead to constipation. You were given a laxative, Miralax, to prevent constipation. You may continue taking Miralax at home should you experience constipation.

## 2022-04-20 NOTE — H&P ADULT - PROBLEM SELECTOR PLAN 3
History of breast cancer, s/p mastectomy of R breast.   - obtain collateral from PCP (Dr. Ike Gardiner)  - verify home medications in AM (pharmacy closed) and if patient is on anastrazole

## 2022-04-20 NOTE — DISCHARGE NOTE PROVIDER - NSDCMRMEDTOKEN_GEN_ALL_CORE_FT
acetaminophen 325 mg oral tablet: 2 tab(s) orally every 6 hours, As Needed for pain   anastrozole 1 mg oral tablet: 1 tab(s) orally once a day  aspirin 81 mg oral tablet: 1 tab(s) orally once a day  FLUoxetine 20 mg oral tablet:  orally 2 times a day  Percocet 5/325 oral tablet: 1 tab(s) orally every 6 hours MDD:4  PROzac:   tolterodine 4 mg oral capsule, extended release: 1 cap(s) orally once a day   acetaminophen 325 mg oral tablet: 2 tab(s) orally every 6 hours, As Needed for pain   aspirin 81 mg oral tablet: 1 tab(s) orally once a day  cholecalciferol oral tablet: 400 unit(s) orally once a day  FLUoxetine 20 mg oral tablet:  orally 2 times a day  Percocet 5/325 oral tablet: 1 tab(s) orally every 6 hours MDD:4  PROzac:   tolterodine 4 mg oral capsule, extended release: 1 cap(s) orally once a day   acetaminophen 325 mg oral tablet: 2 tab(s) orally every 6 hours, As Needed for pain   aspirin 81 mg oral tablet: 1 tab(s) orally once a day  cholecalciferol oral tablet: 400 unit(s) orally once a day  FLUoxetine 20 mg oral tablet:  orally 2 times a day  Percocet 5/325 oral tablet: 1 tab(s) orally every 6 hours MDD:4  tolterodine 4 mg oral capsule, extended release: 1 cap(s) orally once a day   acetaminophen 325 mg oral tablet: 2 tab(s) orally every 6 hours, As Needed for pain   aspirin 81 mg oral tablet: 1 tab(s) orally once a day  cholecalciferol oral tablet: 400 unit(s) orally once a day  FLUoxetine 20 mg oral tablet:  orally 2 times a day  tolterodine 4 mg oral capsule, extended release: 1 cap(s) orally once a day

## 2022-04-20 NOTE — H&P ADULT - ASSESSMENT
Assessment:   89F with PMH of urinary retention/overactive bladder, breast cancer (s/p right mastectomy), hard of hearing, and osteoporosis, BIBEMS for back pain s/p mechanical fall at home.

## 2022-04-20 NOTE — PHYSICAL THERAPY INITIAL EVALUATION ADULT - ACTIVE RANGE OF MOTION EXAMINATION, REHAB EVAL
limited by back pain/bilateral upper extremity Active ROM was WFL (within functional limits)/bilateral  lower extremity Active ROM was WFL (within functional limits)

## 2022-04-20 NOTE — H&P ADULT - PROBLEM SELECTOR PLAN 5
#Osteoporosis:   History of osteoporosis. Takes vitamin D and calcium at home.  - continue with vitamin d supplementation  - verify home medications in AM (pharmacy closed)

## 2022-04-20 NOTE — PROGRESS NOTE ADULT - PROBLEM SELECTOR PLAN 1
#Status-post mechanical fall:   Patient describes that she tripped and fell when feeding her cat. Denies any head trauma or LOC. Denies any CP or palpitations. Patient states that she lives at home alone, has a , no HHA.  - EKG showed NSR in ED, no new ischemic changes   - CT head with no acute abnormalities  - CT lumbar spine showed no acute fracture of the visualized osseous structures  - CT cervical spine showed no acute fracture or malalignment and moderate cervical spondylosis, most prominent at the level of C3-C4   - PT recommending DOT

## 2022-04-20 NOTE — H&P ADULT - HISTORY OF PRESENT ILLNESS
HPI:   89F with PMH of urinary retention/overactive bladder, breast cancer (s/p right mastectomy and osteoporosis, BIBEMS for back pain s/p mechanical fall. Patient very hard of hearing and states that she does not feel like answering questions at this time. Does describe that she has had multiple falls in the past year. She says that she was feeding her cat when she tripped and fell down on her back. Denies hitting her head or any LOC. Says that she landed on her back and does have some residual pain there. States that she pressed her life alert button and waited for EMS to arrive, so was not on the ground for very long. Currently has no acute complaints aside from back pain, which is 8/120, sharp, and worse with movement. Feels that her LE strength is not diminished but that it is currently limited by pain. Has been incontinent for 2 years, for which she wears diapers at home. Denies n/v/d/c, fever, chills, SOB, CP, and dysuria. Lives alone but does have a  who helps her. No HHA.    ED COURSE:   Vitals: temp 97.4, HR 72, /84, RR 16, sat 98%   Labs: WBC 7.41, H/H 11.8/36.2, .6, plt 229, Na 139, K 4.4, Cl 104, bicarb 26, Cr 0.73, alk phos, AST/ALT all WNL, COVID neg   Imaging: CXR normal, CT head with no acute abnormalities, CT lumbar spine: no acute fracture of the visualized osseous structures, CT cervical spine: showed no acute fracture or malalignment and moderate cervical spondylosis, most prominent at the level of C3-C4. EKG pending.   Interventions: given Tylenol 1000 mg PO x1    HPI:   89F with PMH of urinary retention/overactive bladder, breast cancer (s/p right mastectomy), hard of hearing, and osteoporosis, BIBEMS for back pain s/p mechanical fall. Patient very hard of hearing and states that she does not feel like answering questions at this time. Does describe that she has had multiple falls in the past year. She says that she was feeding her cat when she tripped and fell down on her back. Denies hitting her head or any LOC. Says that she landed on her back and does have some residual pain there. States that she pressed her life alert button and waited for EMS to arrive, so was not on the ground for very long. Currently has no acute complaints aside from back pain, which is 8/120, sharp, and worse with movement. Feels that her LE strength is not diminished but that it is currently limited by pain. Has been incontinent for 2 years, for which she wears diapers at home. Denies n/v/d/c, fever, chills, SOB, CP, and dysuria. Lives alone but does have a  who helps her. No HHA.    ED COURSE:   Vitals: temp 97.4, HR 72, /84, RR 16, sat 98%   Labs: WBC 7.41, H/H 11.8/36.2, .6, plt 229, Na 139, K 4.4, Cl 104, bicarb 26, Cr 0.73, alk phos, AST/ALT all WNL, COVID neg   Imaging: CXR normal, CT head with no acute abnormalities, CT lumbar spine: no acute fracture of the visualized osseous structures, CT cervical spine: showed no acute fracture or malalignment and moderate cervical spondylosis, most prominent at the level of C3-C4. EKG pending.   Interventions: given Tylenol 1000 mg PO x1    HPI:   89F with PMH of urinary retention/overactive bladder, breast cancer (s/p right mastectomy), hard of hearing, and osteoporosis, BIBEMS for back pain s/p mechanical fall. Patient very hard of hearing and states that she does not feel like answering questions at this time. Does describe that she has had multiple falls in the past year. She says that she was feeding her cat when she tripped and fell down on her back. Denies hitting her head or any LOC. Says that she landed on her back and does have some residual pain there. States that she pressed her life alert button and waited for EMS to arrive, so was not on the ground for very long. Currently has no acute complaints aside from back pain, which is 8/120, sharp, and worse with movement. Feels that her LE strength is not diminished but that it is currently limited by pain. Has been incontinent for 2 years, for which she wears diapers at home. Denies n/v/d/c, fever, chills, SOB, CP, and dysuria. Lives alone but does have a  who helps her. No HHA.    ED COURSE:   Vitals: temp 97.4, HR 72, /84, RR 16, sat 98%   Labs: WBC 7.41, H/H 11.8/36.2, .6, plt 229, Na 139, K 4.4, Cl 104, bicarb 26, Cr 0.73, alk phos, AST/ALT all WNL, COVID neg   Imaging: CXR normal, CT head with no acute abnormalities, CT lumbar spine: no acute fracture of the visualized osseous structures, CT cervical spine: showed no acute fracture or malalignment and moderate cervical spondylosis, most prominent at the level of C3-C4. EKG showed NSR in ED.   Interventions: given Tylenol 1000 mg PO x1    HPI:   89F with PMH of urinary retention/overactive bladder, breast cancer (s/p right mastectomy), hard of hearing, and osteoporosis, BIBEMS for back pain s/p mechanical fall. Patient very hard of hearing and states that she does not feel like answering questions at this time. Does describe that she has had multiple falls in the past year. She says that she was feeding her cat when she tripped and fell down on her back. Denies hitting her head or any LOC. Says that she landed on her back and does have some residual pain there. States that she pressed her life alert button and waited for EMS to arrive, so was not on the ground for very long. Currently has no acute complaints aside from back pain, which is 8/10, sharp, and worse with movement. Feels that her LE strength is not diminished but that it is currently limited by pain. Has been incontinent for 2 years, for which she wears diapers at home. Denies n/v/d/c, fever, chills, SOB, CP, and dysuria. Lives alone but does have a  who helps her. No HHA.    ED COURSE:   Vitals: temp 97.4, HR 72, /84, RR 16, sat 98%   Labs: WBC 7.41, H/H 11.8/36.2, .6, plt 229, Na 139, K 4.4, Cl 104, bicarb 26, Cr 0.73, alk phos, AST/ALT all WNL, COVID neg   Imaging: CXR normal, CT head with no acute abnormalities, CT lumbar spine: no acute fracture of the visualized osseous structures, CT cervical spine: showed no acute fracture or malalignment and moderate cervical spondylosis, most prominent at the level of C3-C4. EKG showed NSR in ED.   Interventions: given Tylenol 1000 mg PO x1

## 2022-04-20 NOTE — H&P ADULT - PROBLEM SELECTOR PLAN 7
Nutrition and metabolism:   F: none   E: keep K>4, Mg>2   N: regular diet     DVT ppx: Lovenox   GI ppx: none

## 2022-04-20 NOTE — H&P ADULT - NSHPPHYSICALEXAM_GEN_ALL_CORE
VITAL SIGNS:  T(C): 36.6 (04-20-22 @ 00:37), Max: 36.6 (04-19-22 @ 18:56)  T(F): 97.8 (04-20-22 @ 00:37), Max: 97.8 (04-19-22 @ 18:56)  HR: 69 (04-20-22 @ 00:37) (68 - 74)  BP: 171/81 (04-20-22 @ 00:37) (152/84 - 171/81)  BP(mean): --  RR: 18 (04-20-22 @ 00:37) (16 - 18)  SpO2: 97% (04-20-22 @ 00:37) (97% - 98%)  Wt(kg): --    PHYSICAL EXAM:  Constitutional: WDWN, lying comfortably in bed, in mild discomfort, NAD   Head: Nc/At  Eyes: PERRL, EOMI, clear conjunctiva  ENT: no nasal discharge; uvula midline, no oropharyngeal erythema or exudates; MMM  Neck: supple; no JVD or thyromegaly  Respiratory: CTA b/l, no wheezes, rales, or rhonchi  Cardiac: +S1/S2, +RRR, no murmurs, rubs, or gallops  Gastrointestinal: soft, non-tender, non-distended, no rebound/guarding, no palpable masses, normoactive bowel sounds x4  Back: spine midline, bony tenderness (more on left side), no step-offs; no CVAT B/L  Extremities: WWP, no clubbing or cyanosis, no peripheral edema  Musculoskeletal: NROM x2, LE ROM limited by pain; no joint swelling, tenderness or erythema  Vascular: 2+ radial and DP pulses b/l  Dermatologic: skin warm, dry and intact, no rashes, wounds, or scars  Neurologic: AAOx3, hard of hearing, CNII-XII grossly intact, no focal deficits, LE strength 3/5 b/l (strength limited by pain), sensation intact throughout LE, UE strength 5/5 b/l, good  strength   Psychiatric: affect and characteristics of appearance, verbalizations, behaviors are appropriate

## 2022-04-20 NOTE — DISCHARGE NOTE PROVIDER - NSDCFUADDAPPT_GEN_ALL_CORE_FT
Please follow up with Dr. Stevens for your back pain  April 27th 2022 at 09:00AM  [address] [place] [address] [phone]

## 2022-04-20 NOTE — PROGRESS NOTE ADULT - SUBJECTIVE AND OBJECTIVE BOX
INTERVAL HPI/OVERNIGHT EVENTS:  As per night team, no overnight events. Patient seen and examined at bedside. Patient hard of hearing but A&O. C/o 8/10 back pain but no new weakness/numbness    VITALS  Vital Signs Last 24 Hrs  T(C): 36.6 (20 Apr 2022 12:29), Max: 36.6 (19 Apr 2022 18:56)  T(F): 97.8 (20 Apr 2022 12:29), Max: 97.8 (19 Apr 2022 18:56)  HR: 70 (20 Apr 2022 12:29) (62 - 74)  BP: 124/72 (20 Apr 2022 12:29) (124/72 - 171/81)  BP(mean): --  RR: 18 (20 Apr 2022 12:29) (16 - 18)  SpO2: 94% (20 Apr 2022 12:29) (94% - 100%)    CAPILLARY BLOOD GLUCOSE      PHYSICAL EXAM  General: NAD, sitting comfortably in bed   HEENT: PERRL/ EOMI, no scleral icterus, MMM  Neck: Supple, no JVD  Respiratory: lungs CTA b/l, no wheezes/crackles, no accessory muscle use  Cardiovascular: Regular rhythm/rate; +S1 +S2, no murmurs  Gastrointestinal: Soft, NTND, normoactive BS, no rebound, no guarding  Genitourinary: no suprapubic tenderness  Extremities: WWP, no cyanosis, no clubbing, no edema, pulses equal  Neurological: AAOx3, hard of hearing, CNII-XII grossly intact, no focal deficits, LE strength 3/5 b/l (strength limited by pain), sensation intact throughout LE, UE strength 5/5 b/l, good  strength   Skin: Normal temperature, warm, dry      MEDICATIONS  (STANDING):  acetaminophen     Tablet .. 650 milliGRAM(s) Oral every 6 hours  aspirin  chewable 81 milliGRAM(s) Oral daily  cholecalciferol 400 Unit(s) Oral daily  enoxaparin Injectable 40 milliGRAM(s) SubCutaneous every 24 hours  oxybutynin 5 milliGRAM(s) Oral two times a day  polyethylene glycol 3350 17 Gram(s) Oral daily    MEDICATIONS  (PRN):  oxyCODONE    IR 5 milliGRAM(s) Oral three times a day PRN Severe Pain (7 - 10)      penicillin (Unknown)      LABS                        11.7   5.70  )-----------( 221      ( 20 Apr 2022 06:45 )             36.5     04-20    140  |  105  |  15  ----------------------------<  89  4.2   |  25  |  0.72    Ca    8.8      20 Apr 2022 06:42  Phos  3.1     04-20  Mg     2.2     04-20    TPro  6.6  /  Alb  3.5  /  TBili  0.4  /  DBili  x   /  AST  19  /  ALT  13  /  AlkPhos  65  04-20              RADIOLOGY & ADDITIONAL TESTS: Reviewed

## 2022-04-20 NOTE — PROGRESS NOTE ADULT - PROBLEM SELECTOR PLAN 3
History of breast cancer, s/p mastectomy of R breast.   - per pharmacy, not currently taking anastrazole  - f/u outpatient

## 2022-04-21 ENCOUNTER — TRANSCRIPTION ENCOUNTER (OUTPATIENT)
Age: 87
End: 2022-04-21

## 2022-04-21 VITALS
RESPIRATION RATE: 18 BRPM | HEART RATE: 68 BPM | DIASTOLIC BLOOD PRESSURE: 72 MMHG | OXYGEN SATURATION: 96 % | SYSTOLIC BLOOD PRESSURE: 155 MMHG | TEMPERATURE: 97 F

## 2022-04-21 LAB
ANION GAP SERPL CALC-SCNC: 7 MMOL/L — SIGNIFICANT CHANGE UP (ref 5–17)
BUN SERPL-MCNC: 16 MG/DL — SIGNIFICANT CHANGE UP (ref 7–23)
CALCIUM SERPL-MCNC: 8.8 MG/DL — SIGNIFICANT CHANGE UP (ref 8.4–10.5)
CHLORIDE SERPL-SCNC: 104 MMOL/L — SIGNIFICANT CHANGE UP (ref 96–108)
CO2 SERPL-SCNC: 25 MMOL/L — SIGNIFICANT CHANGE UP (ref 22–31)
CREAT SERPL-MCNC: 0.71 MG/DL — SIGNIFICANT CHANGE UP (ref 0.5–1.3)
EGFR: 80 ML/MIN/1.73M2 — SIGNIFICANT CHANGE UP
GLUCOSE SERPL-MCNC: 97 MG/DL — SIGNIFICANT CHANGE UP (ref 70–99)
HCT VFR BLD CALC: 35.1 % — SIGNIFICANT CHANGE UP (ref 34.5–45)
HGB BLD-MCNC: 11.4 G/DL — LOW (ref 11.5–15.5)
MAGNESIUM SERPL-MCNC: 2 MG/DL — SIGNIFICANT CHANGE UP (ref 1.6–2.6)
MCHC RBC-ENTMCNC: 32.5 GM/DL — SIGNIFICANT CHANGE UP (ref 32–36)
MCHC RBC-ENTMCNC: 33.3 PG — SIGNIFICANT CHANGE UP (ref 27–34)
MCV RBC AUTO: 102.6 FL — HIGH (ref 80–100)
NRBC # BLD: 0 /100 WBCS — SIGNIFICANT CHANGE UP (ref 0–0)
PHOSPHATE SERPL-MCNC: 3.2 MG/DL — SIGNIFICANT CHANGE UP (ref 2.5–4.5)
PLATELET # BLD AUTO: 206 K/UL — SIGNIFICANT CHANGE UP (ref 150–400)
POTASSIUM SERPL-MCNC: 4.8 MMOL/L — SIGNIFICANT CHANGE UP (ref 3.5–5.3)
POTASSIUM SERPL-SCNC: 4.8 MMOL/L — SIGNIFICANT CHANGE UP (ref 3.5–5.3)
RBC # BLD: 3.42 M/UL — LOW (ref 3.8–5.2)
RBC # FLD: 12.7 % — SIGNIFICANT CHANGE UP (ref 10.3–14.5)
SODIUM SERPL-SCNC: 136 MMOL/L — SIGNIFICANT CHANGE UP (ref 135–145)
WBC # BLD: 6.82 K/UL — SIGNIFICANT CHANGE UP (ref 3.8–10.5)
WBC # FLD AUTO: 6.82 K/UL — SIGNIFICANT CHANGE UP (ref 3.8–10.5)

## 2022-04-21 PROCEDURE — 72131 CT LUMBAR SPINE W/O DYE: CPT | Mod: MG

## 2022-04-21 PROCEDURE — 99285 EMERGENCY DEPT VISIT HI MDM: CPT | Mod: 25

## 2022-04-21 PROCEDURE — 70450 CT HEAD/BRAIN W/O DYE: CPT | Mod: MG

## 2022-04-21 PROCEDURE — 82607 VITAMIN B-12: CPT

## 2022-04-21 PROCEDURE — 93005 ELECTROCARDIOGRAM TRACING: CPT

## 2022-04-21 PROCEDURE — 73523 X-RAY EXAM HIPS BI 5/> VIEWS: CPT

## 2022-04-21 PROCEDURE — G1004: CPT

## 2022-04-21 PROCEDURE — 83735 ASSAY OF MAGNESIUM: CPT

## 2022-04-21 PROCEDURE — 97162 PT EVAL MOD COMPLEX 30 MIN: CPT

## 2022-04-21 PROCEDURE — 86850 RBC ANTIBODY SCREEN: CPT

## 2022-04-21 PROCEDURE — 72125 CT NECK SPINE W/O DYE: CPT | Mod: MG

## 2022-04-21 PROCEDURE — 82746 ASSAY OF FOLIC ACID SERUM: CPT

## 2022-04-21 PROCEDURE — 71045 X-RAY EXAM CHEST 1 VIEW: CPT

## 2022-04-21 PROCEDURE — 80053 COMPREHEN METABOLIC PANEL: CPT

## 2022-04-21 PROCEDURE — 36415 COLL VENOUS BLD VENIPUNCTURE: CPT

## 2022-04-21 PROCEDURE — 80048 BASIC METABOLIC PNL TOTAL CA: CPT

## 2022-04-21 PROCEDURE — 84100 ASSAY OF PHOSPHORUS: CPT

## 2022-04-21 PROCEDURE — 85025 COMPLETE CBC W/AUTO DIFF WBC: CPT

## 2022-04-21 PROCEDURE — 99239 HOSP IP/OBS DSCHRG MGMT >30: CPT

## 2022-04-21 PROCEDURE — 86901 BLOOD TYPING SEROLOGIC RH(D): CPT

## 2022-04-21 PROCEDURE — 87635 SARS-COV-2 COVID-19 AMP PRB: CPT

## 2022-04-21 PROCEDURE — 85027 COMPLETE CBC AUTOMATED: CPT

## 2022-04-21 PROCEDURE — 86900 BLOOD TYPING SEROLOGIC ABO: CPT

## 2022-04-21 RX ADMIN — Medication 81 MILLIGRAM(S): at 11:21

## 2022-04-21 RX ADMIN — Medication 650 MILLIGRAM(S): at 11:21

## 2022-04-21 RX ADMIN — Medication 5 MILLIGRAM(S): at 05:45

## 2022-04-21 RX ADMIN — ENOXAPARIN SODIUM 40 MILLIGRAM(S): 100 INJECTION SUBCUTANEOUS at 05:46

## 2022-04-21 RX ADMIN — Medication 400 UNIT(S): at 11:21

## 2022-04-21 RX ADMIN — Medication 650 MILLIGRAM(S): at 06:36

## 2022-04-21 RX ADMIN — POLYETHYLENE GLYCOL 3350 17 GRAM(S): 17 POWDER, FOR SOLUTION ORAL at 11:21

## 2022-04-21 RX ADMIN — Medication 650 MILLIGRAM(S): at 05:45

## 2022-04-21 NOTE — DISCHARGE NOTE NURSING/CASE MANAGEMENT/SOCIAL WORK - NSDCFUADDAPPT_GEN_ALL_CORE_FT
Please follow up with Dr. Stevens for your back pain  April 27th 2022 at 09:00AM  [address] [place] [address] [phone]

## 2022-04-21 NOTE — DISCHARGE NOTE NURSING/CASE MANAGEMENT/SOCIAL WORK - HAVE YOU HAD A FIRST COVID-19 BOOSTER?
Booster status unknown
Anesthesia Volume In Cc: 0.5
Post-Care Instructions: I reviewed with the patient in detail post-care instructions. Patient is to wear sunprotection, and avoid picking at any of the treated lesions. Pt may apply Vaseline to crusted or scabbing areas.
Medical Necessity Clause: This procedure was medically necessary because the lesions that were treated were:
Add 52 Modifier (Optional): no
Detail Level: Simple
Consent: The patient's consent was obtained including but not limited to risks of crusting, scabbing, blistering, scarring, darker or lighter pigmentary change, recurrence, incomplete removal and infection.
Bill Insurance (You Assume Risk Of Denial Or Audit By Selecting Yes): Yes
Medical Necessity Information: It is in your best interest to select a reason for this procedure from the list below. All of these items fulfill various CMS LCD requirements except the new and changing color options.

## 2022-04-21 NOTE — DISCHARGE NOTE NURSING/CASE MANAGEMENT/SOCIAL WORK - NSDCPEFALRISK_GEN_ALL_CORE
For information on Fall & Injury Prevention, visit: [email or web address] OR  [email or web address] OR  https://www.cdc.gov/steadi/patient.html

## 2022-04-21 NOTE — DISCHARGE NOTE NURSING/CASE MANAGEMENT/SOCIAL WORK - PATIENT PORTAL LINK FT
You can access the FollowMyHealth Patient Portal offered by [facility] by registering at the following website: [email or web address]. By joining [employer]’s FollowMyHealth portal, you will also be able to view your health information using other applications (apps) compatible with our system.

## 2022-04-25 ENCOUNTER — EMERGENCY (EMERGENCY)
Facility: HOSPITAL | Age: 87
LOS: 1 days | Discharge: HOME | End: 2022-04-25
Attending: EMERGENCY MEDICINE | Admitting: EMERGENCY MEDICINE
Payer: MEDICARE

## 2022-04-25 VITALS
TEMPERATURE: 98 F | SYSTOLIC BLOOD PRESSURE: 143 MMHG | HEIGHT: 62 IN | RESPIRATION RATE: 18 BRPM | OXYGEN SATURATION: 97 % | HEART RATE: 73 BPM | WEIGHT: 149.91 LBS | DIASTOLIC BLOOD PRESSURE: 82 MMHG

## 2022-04-25 DIAGNOSIS — C50.919 MALIGNANT NEOPLASM OF UNSPECIFIED SITE OF UNSPECIFIED FEMALE BREAST: ICD-10-CM

## 2022-04-25 DIAGNOSIS — M54.50 LOW BACK PAIN, UNSPECIFIED: ICD-10-CM

## 2022-04-25 DIAGNOSIS — M91.0 JUVENILE OSTEOCHONDROSIS OF PELVIS: ICD-10-CM

## 2022-04-25 DIAGNOSIS — Z79.82 LONG TERM (CURRENT) USE OF ASPIRIN: ICD-10-CM

## 2022-04-25 DIAGNOSIS — Z88.0 ALLERGY STATUS TO PENICILLIN: ICD-10-CM

## 2022-04-25 PROBLEM — M81.0 AGE-RELATED OSTEOPOROSIS WITHOUT CURRENT PATHOLOGICAL FRACTURE: Chronic | Status: ACTIVE | Noted: 2022-04-20

## 2022-04-25 PROBLEM — R29.6 REPEATED FALLS: Chronic | Status: ACTIVE | Noted: 2022-04-20

## 2022-04-25 PROBLEM — M54.9 DORSALGIA, UNSPECIFIED: Chronic | Status: ACTIVE | Noted: 2022-04-20

## 2022-04-25 PROBLEM — D75.89 OTHER SPECIFIED DISEASES OF BLOOD AND BLOOD-FORMING ORGANS: Chronic | Status: ACTIVE | Noted: 2022-04-20

## 2022-04-25 PROBLEM — R33.9 RETENTION OF URINE, UNSPECIFIED: Chronic | Status: ACTIVE | Noted: 2022-04-20

## 2022-04-25 PROCEDURE — 99283 EMERGENCY DEPT VISIT LOW MDM: CPT

## 2022-04-25 PROCEDURE — 99283 EMERGENCY DEPT VISIT LOW MDM: CPT | Mod: 25

## 2022-04-25 RX ORDER — ACETAMINOPHEN 500 MG
975 TABLET ORAL ONCE
Refills: 0 | Status: COMPLETED | OUTPATIENT
Start: 2022-04-25 | End: 2022-04-25

## 2022-04-25 RX ORDER — LIDOCAINE 4 G/100G
1 CREAM TOPICAL ONCE
Refills: 0 | Status: COMPLETED | OUTPATIENT
Start: 2022-04-25 | End: 2022-04-25

## 2022-04-25 RX ADMIN — Medication 975 MILLIGRAM(S): at 10:28

## 2022-04-25 RX ADMIN — LIDOCAINE 1 PATCH: 4 CREAM TOPICAL at 10:28

## 2022-04-25 NOTE — ED PROVIDER NOTE - NSFOLLOWUPINSTRUCTIONS_ED_ALL_ED_FT
Please follow up with Dr. Stevens regarding next steps about your back pain     April 27th 2022 at 09:00AM - that is Wednesday  [address]  [place] [address]  [phone]    Back Pain    Back pain is very common in adults. The cause of back pain is rarely dangerous and the pain often gets better over time. The cause of your back pain may not be known and may include strain of muscles or ligaments, degeneration of the spinal disks, or arthritis. Occasionally the pain may radiate down your leg(s). Over-the-counter medicines to reduce pain and inflammation are often the most helpful. Stretching and remaining active frequently helps the healing process.     SEEK IMMEDIATE MEDICAL CARE IF YOU HAVE ANY OF THE FOLLOWING SYMPTOMS: bowel or bladder control problems, unusual weakness or numbness in your arms or legs, nausea or vomiting, abdominal pain, fever, dizziness/lightheadedness. Please take acetaminophen 650 mg every 6 hours, with no missed doses, so your pain stays well controlled. You can take aleve with this as well, but because of the gastrointestinal side effects, prefer you to take more acetaminophen and less aleve.    Please follow up with Dr. Stevens regarding next steps about your back pain     April 27th 2022 at 09:00AM - that is Wednesday  [address]  [place], [place] [address]  [phone]    Back Pain    Back pain is very common in adults. The cause of back pain is rarely dangerous and the pain often gets better over time. The cause of your back pain may not be known and may include strain of muscles or ligaments, degeneration of the spinal disks, or arthritis. Occasionally the pain may radiate down your leg(s). Over-the-counter medicines to reduce pain and inflammation are often the most helpful. Stretching and remaining active frequently helps the healing process.     SEEK IMMEDIATE MEDICAL CARE IF YOU HAVE ANY OF THE FOLLOWING SYMPTOMS: bowel or bladder control problems, unusual weakness or numbness in your arms or legs, nausea or vomiting, abdominal pain, fever, dizziness/lightheadedness.

## 2022-04-25 NOTE — ED PROVIDER NOTE - CLINICAL SUMMARY MEDICAL DECISION MAKING FREE TEXT BOX
here w/ continued pain, no new trauma. will check xr, and plan for pain control. needs to f/u outpatient

## 2022-04-25 NOTE — ED ADULT NURSE NOTE - OBJECTIVE STATEMENT
89 y/o female c/o back pain from mechanical fall last Monday. as per pt, "I had an appointment with Dr. Jain at 9am today and the office said I have the appointment Wednesday, so I came here." Pt denies n/v/d.

## 2022-04-25 NOTE — ED PROVIDER NOTE - PATIENT PORTAL LINK FT
You can access the FollowMyHealth Patient Portal offered by [facility] by registering at the following website: [email or web address]. By joining [employer]’s FollowMyHealth portal, you will also be able to view your health information using other applications (apps) compatible with our system.

## 2022-04-25 NOTE — ED PROVIDER NOTE - OBJECTIVE STATEMENT
89 yo F with past medical history of urinary retention/overactive bladder, breast cancer (s/p R mastectomy ~15 years ago), and osteoporosis, recent admission for back pain after a fall w/ no acute fractures on CT imaging, dc w/ plan to f/u ortho and tylenol for pain, returns for continued/worsening pain. pt states taking aleve around the clock, and tylenol once a day. Got a call last week about her f/u ortho appt and told to come today, went to clinic, but found out appt is in 2 days, not today, but because of continued pain, came to the ED instead for evaluation. has been laying in bed mostly, no new trauma. no further falls at home. HHA at bedside.

## 2022-04-25 NOTE — ED ADULT NURSE NOTE - COVID-19 RESULT
Problem: Falls - Risk of:  Goal: Will remain free from falls  Description: Will remain free from falls  Outcome: Ongoing  Goal: Absence of physical injury  Description: Absence of physical injury  Outcome: Ongoing     Problem: Skin Integrity:  Goal: Will show no infection signs and symptoms  Description: Will show no infection signs and symptoms  Outcome: Ongoing  Goal: Absence of new skin breakdown  Description: Absence of new skin breakdown  Outcome: Ongoing     Problem: Pain:  Goal: Pain level will decrease  Description: Pain level will decrease  Outcome: Ongoing  Goal: Control of acute pain  Description: Control of acute pain  Outcome: Ongoing  Goal: Control of chronic pain  Description: Control of chronic pain  Outcome: Ongoing NEGATIVE

## 2022-04-25 NOTE — ED PROVIDER NOTE - PHYSICAL EXAMINATION
CONSTITUTIONAL: Elderly woman, frail appearing, in no apparent distress.   HEAD: Normocephalic; atraumatic.   EYES:  conjunctiva and sclera clear  ENT: normal nose; no rhinorrhea;  NECK: Supple; full ROM  RESPIRATORY: Breathing easily; no resp difficulty  BACK: tender diffusely over L lateral back,  EXT: No cyanosis or edema; hips non tender to palpation, stable, able to range.   SKIN: Normal for age and race; warm; dry; good turgor; no apparent lesions or rash.   NEURO: A & O x 3; face symmetric; moving all extremities equally  PSYCHOLOGICAL: The patient’s mood and manner are appropriate.

## 2022-04-25 NOTE — ED ADULT TRIAGE NOTE - HEIGHT IN FEET
PT  Called in retunring missed phone call  PT would like a call back  
Pt returning your call about lab results - please call back
5

## 2022-04-27 ENCOUNTER — APPOINTMENT (OUTPATIENT)
Dept: ORTHOPEDIC SURGERY | Facility: CLINIC | Age: 87
End: 2022-04-27
Payer: MEDICARE

## 2022-04-27 VITALS
HEART RATE: 81 BPM | HEIGHT: 62 IN | BODY MASS INDEX: 21.16 KG/M2 | SYSTOLIC BLOOD PRESSURE: 135 MMHG | DIASTOLIC BLOOD PRESSURE: 84 MMHG | TEMPERATURE: 98.8 F | OXYGEN SATURATION: 95 % | WEIGHT: 115 LBS

## 2022-04-27 PROCEDURE — 99214 OFFICE O/P EST MOD 30 MIN: CPT

## 2022-04-27 RX ORDER — LIDOCAINE HCL 4 %
4 LIQUID ROLL-ON (ML) TOPICAL
Qty: 30 | Refills: 0 | Status: ACTIVE | COMMUNITY
Start: 2022-04-27 | End: 1900-01-01

## 2022-04-28 DIAGNOSIS — H91.10 PRESBYCUSIS, UNSPECIFIED EAR: ICD-10-CM

## 2022-04-28 DIAGNOSIS — R29.6 REPEATED FALLS: ICD-10-CM

## 2022-04-28 DIAGNOSIS — R52 PAIN, UNSPECIFIED: ICD-10-CM

## 2022-04-28 DIAGNOSIS — G89.11 ACUTE PAIN DUE TO TRAUMA: ICD-10-CM

## 2022-04-28 DIAGNOSIS — M54.89 OTHER DORSALGIA: ICD-10-CM

## 2022-04-28 DIAGNOSIS — M43.02 SPONDYLOLYSIS, CERVICAL REGION: ICD-10-CM
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
## 2022-04-28 DIAGNOSIS — H91.90 UNSPECIFIED HEARING LOSS, UNSPECIFIED EAR: ICD-10-CM

## 2022-04-28 DIAGNOSIS — M81.0 AGE-RELATED OSTEOPOROSIS WITHOUT CURRENT PATHOLOGICAL FRACTURE: ICD-10-CM

## 2022-04-28 DIAGNOSIS — Z85.3 PERSONAL HISTORY OF MALIGNANT NEOPLASM OF BREAST: ICD-10-CM

## 2022-04-28 DIAGNOSIS — Z90.11 ACQUIRED ABSENCE OF RIGHT BREAST AND NIPPLE: ICD-10-CM
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
## 2022-04-28 DIAGNOSIS — R33.9 RETENTION OF URINE, UNSPECIFIED: ICD-10-CM

## 2022-04-28 DIAGNOSIS — D75.89 OTHER SPECIFIED DISEASES OF BLOOD AND BLOOD-FORMING ORGANS: ICD-10-CM

## 2022-04-28 DIAGNOSIS — Z88.0 ALLERGY STATUS TO PENICILLIN: ICD-10-CM

## 2022-05-02 NOTE — DISCUSSION/SUMMARY
[de-identified] : Discussed my findings with the patient and home health aide. Compression fractures appear chronic and unchanged from 12/2021 CT lumbar. Patient reports worsening pain over past week s/p fall. Has had a 2 day hospitalization and additional ED visit for these symptoms. I am recommending a TLSO brace in hopes thoracolumbar support will aide with ambulation. Also given prescription for lidocaine patches. She will follow up with me in 1 month if still having pain, sooner if there is an issue.

## 2022-05-02 NOTE — PHYSICAL EXAM
[de-identified] : General: patient is well developed, well nourished, in no acute \par distress, alert and oriented x 3. \par \par Mood and affect: normal\par \par Respiratory: no respiratory distress noted\par \par Skin: no scars over spine, skin intact, no erythema, increased warmth\par \par Alignment:The spine is well compensated in the coronal and sagittal plane.  \par \par Gait: The patient is able to toe walk and heel walk with significant difficulty\par \par Palpation: no tenderness to palpation spine or paraspinal region\par \par Range of motion: Lumbar spine ROM is deferred\par \par Neurologic Exam:\par Motor: Manual Muscle testing in the lower extremities is 5 out of 5 in all muscle groups. There is no evidence of muscular atrophy in the lower extremities. Sensory: Sensation to light touch is grossly intact in the lower extremities\par \par Reflexes: DTR are present and symmetric throughout\par \par Hip Exam: No pain with internal or external rotation of hips bilaterally\par \par Special tests: Straight leg raise test negative.  Cross straight leg test negative.  \par \par  [de-identified] : CT 4/19/22 (my read): severe T12 and L1 compression fractures, additional fractures at L3 and L4, unchanged from 12/2021 CT lumbar\par \par XR 4/25/22 (my read): T12, L1, L3 and L4 compression fractures, unchanged from 4/19 CT, good sagittal and coronal alignment, no additional fractures evident

## 2022-05-02 NOTE — HISTORY OF PRESENT ILLNESS
[de-identified] : Referred by [facility] ED\par \par Ms. JUSTIN is a very pleasant 89 year old female who complains of chronic low back pain, worse after fall 1 week ago. Pain localized to mid and low back pain. No lower extremity radicular pain, no lower extremity paresthesias/numbness/weakness. Was admitted to [facility] 4/19-4/21 for pain control and work up, returned to ED 4/25 for pain control. Now using wheelchair outside home and at home (was using walker prior to most recent fall).\par \par The patient no history of previous spine surgery.\par \par The patient has no history of unexpected weight loss, no history of active cancer, no history bladder or bowel dysfunction, no night pain, no fevers or chills.\par \par The past medical history, surgical history, family history, allergies, medications, 10+ point review of systems, family history and social history were reviewed and non contributory.\par \par \par \par

## 2022-05-10 ENCOUNTER — APPOINTMENT (OUTPATIENT)
Dept: ORTHOPEDIC SURGERY | Facility: AMBULATORY SURGERY CENTER | Age: 87
End: 2022-05-10
Payer: MEDICARE

## 2022-05-10 PROCEDURE — 99441: CPT | Mod: 95

## 2022-10-20 ENCOUNTER — APPOINTMENT (OUTPATIENT)
Dept: ORTHOPEDIC SURGERY | Facility: CLINIC | Age: 87
End: 2022-10-20

## 2022-10-20 DIAGNOSIS — R26.89 OTHER ABNORMALITIES OF GAIT AND MOBILITY: ICD-10-CM

## 2022-10-20 PROCEDURE — 99214 OFFICE O/P EST MOD 30 MIN: CPT

## 2022-10-20 PROCEDURE — 72100 X-RAY EXAM L-S SPINE 2/3 VWS: CPT

## 2022-11-29 NOTE — ASSESSMENT
[FreeTextEntry1] : 89 yo with bilateral knee arthritis, lumbar spondylosis and history of thoracolumbar compression fractures in the past without any recent acute injuries but having generalized difficulty with ambulation related to a combination of some pain related to knee arthritis, weakness in her legs and deconditioning and poor balance.\par Also for the pain and instability in the knee with risk of falling I ordered a knee brace for support.\par I referred her for therapy which she will do at home.  She can take some Tylenol as needed for pain.  Heat and ice.  Topical over-the-counter pills as needed.\par Her shoulders hurt because she has to push herself out of a chair.  Hopefully by strengthening her legs she will have less pain in the shoulders.\par Follow-up as needed for any injections in the joints which may help her arthritic pain.

## 2022-11-29 NOTE — REASON FOR VISIT
[Follow-Up Visit] : a follow-up visit for [Knee Pain] : knee pain [FreeTextEntry2] : Back pain, leg weakness

## 2022-11-29 NOTE — HISTORY OF PRESENT ILLNESS
[de-identified] : Ms. Das is 89 yo and comes in for bilateral knee pain which is chronic on the right and more recent on the left as well as pain in her right foot and poor balance.  She is having trouble walking. She uses a wheelchair when outside the home and a walker in her house. Steroid injection was done in the right knee last December that was helpful for awhile but cant remember when it wore off. She has a physical therapist that comes once a week and she tries to do some exercises on her own.\par Earlier this year she had thoracolumbar compression fractures. She saw Dr. Stevens after a fall in May.\par She is also having bilateral shoulder pain that she feels is caused by her arms taking a lot of her weight when she is getting up from a seated position.\par She does not really take anything for pain except occasional Tylenol and an over-the-counter topical ointment\par She is concerned about falling and her knee giving way since she has had several falls.

## 2022-11-29 NOTE — PHYSICAL EXAM
[Shuffling] : shuffling [Walker] : ambulates with walker [LE] : Sensory: Intact in bilateral lower extremities [Normal RLE] : Right Lower Extremity: No scars, rashes, lesions, ulcers, skin intact [Normal LLE] : Left Lower Extremity: No scars, rashes, lesions, ulcers, skin intact [Normal Touch] : sensation intact for touch [Normal] : Oriented to person, place, and time, insight and judgement were intact and the affect was normal [de-identified] : Knees\par No edema, ecchymoses, erythema.\par No effusion.\par Tender right greater than left knee patella facets and medial greater than lateral joint line.\par Knee range of motion is from about 5 to 10 degree flexion contracture to 115 degrees on the right with pain and patellofemoral crepitus.\par On the left knee 0 to 130 degrees with mild discomfort.\par Ia Lachman.  Negative Patricia.\par 1+ varus hyperlaxity.\par Intact extensor mechanism.\par \par Lumbar spine\par Flat back.\par Kyphosis.\par No point tenderness along the spine.\par Intact anterior tibial tendon, gastrocsoleus, hip flexors, knee extensors.  Sensation is grossly intact lower extremities.\par She walks with a shuffling gait with assistance. [de-identified] : \par X-rays of bilateral knees 4 views weightbearing December 2021 showed severe degenerative changes in the right knee with medial joint space narrowing and osteophytes almost bone-on-bone and severe patellofemoral degenerative changes.  On the left knee there is more mild medial and patellofemoral degenerative changes present.\par \par X-rays today lumbar spine AP and lateral show multilevel spondylosis at multiple lumbar compression fractures including L4 and L1 and L2.  No acute changes

## 2022-12-13 ENCOUNTER — APPOINTMENT (OUTPATIENT)
Dept: INTERNAL MEDICINE | Facility: CLINIC | Age: 87
End: 2022-12-13

## 2022-12-23 ENCOUNTER — OUTPATIENT (OUTPATIENT)
Dept: OUTPATIENT SERVICES | Facility: HOSPITAL | Age: 87
LOS: 1 days | End: 2022-12-23
Payer: MEDICARE

## 2022-12-23 PROCEDURE — 73630 X-RAY EXAM OF FOOT: CPT | Mod: 26,50

## 2022-12-23 PROCEDURE — 73630 X-RAY EXAM OF FOOT: CPT

## 2023-01-06 NOTE — PHYSICAL THERAPY INITIAL EVALUATION ADULT - PRECAUTIONS/LIMITATIONS, REHAB EVAL
----- Message from Consuelo Benítez MD sent at 1/6/2023  1:21 PM CST -----  Please let her know that her mammogram looks good.  She has dense breast tissue on this year's mammogram.  Please ask her to have a bilateral whole breast screening ultrasound and if that is benign she can see me on an as-needed basis and get her yearly imaging orders from her primary care doctor.  Thank you      ----- Message -----  From: Tony Ramirez Incoming Radiant Results And Orders  Sent: 1/6/2023  11:22 AM CST  To: Consuelo Benítez MD       fall precautions

## 2023-02-07 ENCOUNTER — APPOINTMENT (OUTPATIENT)
Dept: INTERNAL MEDICINE | Facility: CLINIC | Age: 88
End: 2023-02-07

## 2023-02-15 ENCOUNTER — APPOINTMENT (OUTPATIENT)
Dept: OPHTHALMOLOGY | Facility: CLINIC | Age: 88
End: 2023-02-15

## 2023-03-15 ENCOUNTER — APPOINTMENT (OUTPATIENT)
Dept: OPHTHALMOLOGY | Facility: CLINIC | Age: 88
End: 2023-03-15

## 2023-03-15 ENCOUNTER — OUTPATIENT (OUTPATIENT)
Dept: OUTPATIENT SERVICES | Facility: HOSPITAL | Age: 88
LOS: 1 days | End: 2023-03-15

## 2023-03-16 DIAGNOSIS — H26.492 OTHER SECONDARY CATARACT, LEFT EYE: ICD-10-CM

## 2023-04-14 NOTE — ED PROVIDER NOTE - DIAGNOSIS COUNSELING, MDM
Jose Guadalupe Flower(Resident) conducted a detailed discussion... I had a detailed discussion with the patient and/or guardian regarding the historical points, exam findings, and any diagnostic results supporting the discharge/admit diagnosis.

## 2023-05-10 ENCOUNTER — APPOINTMENT (OUTPATIENT)
Dept: ORTHOPEDIC SURGERY | Facility: CLINIC | Age: 88
End: 2023-05-10
Payer: MEDICARE

## 2023-05-10 DIAGNOSIS — L84 CORNS AND CALLOSITIES: ICD-10-CM

## 2023-05-10 DIAGNOSIS — M20.5X1 OTHER DEFORMITIES OF TOE(S) (ACQUIRED), RIGHT FOOT: ICD-10-CM

## 2023-05-10 DIAGNOSIS — M77.41 METATARSALGIA, RIGHT FOOT: ICD-10-CM

## 2023-05-10 PROCEDURE — 99214 OFFICE O/P EST MOD 30 MIN: CPT

## 2023-05-10 PROCEDURE — 73564 X-RAY EXAM KNEE 4 OR MORE: CPT | Mod: RT

## 2023-05-10 NOTE — PHYSICAL EXAM
[Shuffling] : shuffling [Walker] : ambulates with walker [LE] : Sensory: Intact in bilateral lower extremities [Normal RLE] : Right Lower Extremity: No scars, rashes, lesions, ulcers, skin intact [Normal LLE] : Left Lower Extremity: No scars, rashes, lesions, ulcers, skin intact [Normal Touch] : sensation intact for touch [Normal] : Oriented to person, place, and time, insight and judgement were intact and the affect was normal [de-identified] : Knees\par No edema, ecchymoses, erythema.\par No effusion.\par Tender right greater than left knee patella facets and medial greater than lateral joint line.\par Knee range of motion is from about 5 to 10 degree flexion contracture to 115 degrees on the right with pain and patellofemoral crepitus.\par On the left knee 0 to 130 degrees with mild discomfort.\par Ia Lachman.  Negative Patricia.\par 1+ varus hyperlaxity.\par Intact extensor mechanism.\par \par Right foot\par Well-healed incision from prior bunion correction.  Hammertoes.\par Tender under the metatarsal heads with very thin fat pad.  Not tender over the dorsal PIP joints from the hammertoe/claw toes.\par No erythema or ecchymoses or any significant edema.  Skin is intact.  There is callus under the medial ball of the foot.\par Intact ankle and subtalar motion.\par Limited motion MP joints.\par Foot is warm with normal capillary refill [de-identified] : \par X-rays of right knee today weightbearing 4 views compared to x-rays bilateral knees 4 views weightbearing December 2021 showed severe degenerative changes in the right knee with medial joint space narrowing and osteophytes bone-on-bone on the flexed view and severe patellofemoral degenerative changes.\par There has been some progression in the medial compartment arthritis\par \par \par EXAM: XR FOOT COMP MIN 3 VIEWS BI\par PROCEDURE DATE: 12/23/2022\par INTERPRETATION: INDICATION: Bilateral hammertoes\par TECHNIQUE: 3 views of each foot\par COMPARISON: 11/25/2012\par FINDINGS:\par There is no fracture or dislocation. Patient is status post right first metatarsal osteotomy. There are degenerative changes of the left first metatarsal phalangeal joint in the setting of hallux valgus.. There is no focal soft tissue abnormality.\par IMPRESSION:\par Patient is status post right first metatarsal osteotomy without visualized hardware complication. There are degenerative changes of the left first metatarsal phalangeal joint in the setting of hallux valgus.\par

## 2023-05-10 NOTE — ASSESSMENT
[FreeTextEntry1] : 89 yo with bilateral knee arthritis, particularly painful in her right knee there is severe medial compartment arthritis that has progressed and she has right foot pain which I think is related to her thin fat pad causing metatarsalgia and hammertoes with drop metatarsals.  She also has some callus/keratosis.\par She would benefit from a good podiatrist to help shave down some of the callus.\par I suggested getting a deep wide toe box shoe and using orthotics would be helpful.  Metatarsal pad could help unload the metatarsal heads and might provide some pain relief.\par She should try to find a podiatrist close to her since it is something she may need repeatedly.  I also gave her new medical doctors again trying to find someone closer to where she lives to make it more convenient in case of emergencies.\par If she decides she would like a knee injection she will call.\par Follow-up as needed

## 2023-05-10 NOTE — HISTORY OF PRESENT ILLNESS
[de-identified] : Ms. Das is 89 yo and comes in for worsening right knee pain and right foot pain. She uses a wheelchair when outside the home and a walker in her house. Steroid injection was done in the right knee last December that was helpful for awhile but cant remember when it wore off. She has a physical therapist that comes once a week and she tries to do some exercises on her own.\par She did try going to see a podiatrist but had a hard time finding someone and then ended up at the podiatry clinic which she did not find to be very helpful.  They had suggested she get a different shoe which she has not tried yet because she was not quite sure what to get.\par She has pain with the hammertoes.  She had prior bunion surgery on this foot in the past.\par She gets pain under the toes or the ball of the foot when walking.  She has an insert with a partial cut out.\par It hurts a lot walking and pain radiates up her leg to the knee.

## 2023-08-01 ENCOUNTER — APPOINTMENT (OUTPATIENT)
Dept: ORTHOPEDIC SURGERY | Facility: CLINIC | Age: 88
End: 2023-08-01
Payer: MEDICARE

## 2023-08-01 DIAGNOSIS — S50.312A ABRASION OF LEFT ELBOW, INITIAL ENCOUNTER: ICD-10-CM

## 2023-08-01 DIAGNOSIS — M17.12 UNILATERAL PRIMARY OSTEOARTHRITIS, LEFT KNEE: ICD-10-CM

## 2023-08-01 PROCEDURE — 99212 OFFICE O/P EST SF 10 MIN: CPT | Mod: 25

## 2023-08-01 PROCEDURE — 20610 DRAIN/INJ JOINT/BURSA W/O US: CPT | Mod: 59,RT

## 2023-08-01 NOTE — PHYSICAL EXAM
[Shuffling] : shuffling [Walker] : ambulates with walker [LE] : Sensory: Intact in bilateral lower extremities [Normal RLE] : Right Lower Extremity: No scars, rashes, lesions, ulcers, skin intact [Normal LLE] : Left Lower Extremity: No scars, rashes, lesions, ulcers, skin intact [Normal Touch] : sensation intact for touch [Normal] : Oriented to person, place, and time, insight and judgement were intact and the affect was normal [de-identified] : Knees No edema, ecchymoses, erythema. No effusion. Tender right greater than left knee patella facets and medial greater than lateral joint line Right knee range of motion is from about 5 to 10 degree flexion contracture to 110 degrees on the right with pain and patellofemoral crepitus. On the left knee 0 to 130 degrees with mild discomfort. Ia Lachman. Negative Patricia 1+ varus hyperlaxity. Intact extensor mechanism.   [de-identified] :  X-rays of right knee 5/10/23 weightbearing 4 views compared to x-rays bilateral knees 4 views weightbearing December 2021 showed severe degenerative changes in the right knee with medial joint space narrowing and osteophytes bone-on-bone on the flexed view and severe patellofemoral degenerative changes. There has been some progression in the medial compartment arthritis

## 2023-08-01 NOTE — PROCEDURE
[Injection] : Injection [Right] : of the right [Knee Joint] : knee joint [Osteoarthritis] : Osteoarthritis [Patient] : patient [Risk] : risk [Benefits] : benefits [Alternatives] : alternatives [Bleeding] : bleeding [Infection] : infection [Allergic Reaction] : allergic reaction [Verbal Consent Obtained] : verbal consent was obtained prior to the procedure [Alcohol] : Alcohol [Ethyl Chloride Spray] : ethyl chloride spray was used as a topical anesthetic [Lateral] : lateral [1% Lidocaine___(mL)] : [unfilled] mL of 1% Lidocaine [Triamcinolone 40mg/mL___(mL)] : [unfilled] ~UmL of 40mg/mL triamcinolone [Bandage Applied] : a bandage [Tolerated Well] : The patient tolerated the procedure well [None] : none [No Strenuous Activity___day(s)] : avoid strenuous activity for [unfilled] day(s) [PRN] : as needed [Anterior] : anterior [22] : a 22-gauge [FreeTextEntry1] : chloraprep

## 2023-08-01 NOTE — HISTORY OF PRESENT ILLNESS
[de-identified] : Ms. Das is 89 yo and comes in for right knee pain. She uses a wheelchair when outside the home and a walker in her house. Steroid injection was done in the right knee last December 2021 that was helpful for awhile but can't remember when it wore off. She has a physical therapist that comes once a week and she tries to do some exercises on her own. She comes in today because her knee is really hurting more lately. She notes the knee has been giving out on her. She takes one Tylenol at night to help her sleep.  She is interested in an injection.

## 2023-08-01 NOTE — ASSESSMENT
[FreeTextEntry1] : 89 yo with bilateral knee arthritis, particularly painful in her right knee there is severe medial compartment arthritis that has progressed Today we did a steroid injection in the right knee which hopefully will give her relief.  We discussed hyaluronic acid injections as another option in the future.  Would like to minimize steroids.  She finished with therapy but she does do exercises on her own.  She saw a podiatrist who did an injection in her foot as well. We discussed trying various patches as well for pain and Tylenol as needed for occasional Aleve. Follow-up as needed

## 2023-10-27 ENCOUNTER — APPOINTMENT (OUTPATIENT)
Dept: ORTHOPEDIC SURGERY | Facility: CLINIC | Age: 88
End: 2023-10-27
Payer: MEDICARE

## 2023-10-27 DIAGNOSIS — R29.898 OTHER SYMPTOMS AND SIGNS INVOLVING THE MUSCULOSKELETAL SYSTEM: ICD-10-CM

## 2023-10-27 DIAGNOSIS — S22.000D WEDGE COMPRESSION FRACTURE OF UNSPECIFIED THORACIC VERTEBRA, SUBSEQUENT ENCOUNTER FOR FRACTURE WITH ROUTINE HEALING: ICD-10-CM

## 2023-10-27 DIAGNOSIS — M17.11 UNILATERAL PRIMARY OSTEOARTHRITIS, RIGHT KNEE: ICD-10-CM

## 2023-10-27 DIAGNOSIS — S42.035A NONDISPLACED FRACTURE OF LATERAL END OF LEFT CLAVICLE, INITIAL ENCOUNTER FOR CLOSED FRACTURE: ICD-10-CM

## 2023-10-27 DIAGNOSIS — G89.29 PAIN IN RIGHT KNEE: ICD-10-CM

## 2023-10-27 DIAGNOSIS — S40.021A CONTUSION OF RIGHT UPPER ARM, INITIAL ENCOUNTER: ICD-10-CM

## 2023-10-27 DIAGNOSIS — M25.561 PAIN IN RIGHT KNEE: ICD-10-CM

## 2023-10-27 PROCEDURE — 99213 OFFICE O/P EST LOW 20 MIN: CPT | Mod: 25

## 2023-10-27 PROCEDURE — 20610 DRAIN/INJ JOINT/BURSA W/O US: CPT | Mod: 59,RT

## 2024-01-24 ENCOUNTER — INPATIENT (INPATIENT)
Facility: HOSPITAL | Age: 89
LOS: 2 days | Discharge: NURSING, REHAB OR LONG-TERM CARE | DRG: 195 | End: 2024-01-27
Attending: INTERNAL MEDICINE | Admitting: INTERNAL MEDICINE
Payer: MEDICARE

## 2024-01-24 VITALS
RESPIRATION RATE: 18 BRPM | SYSTOLIC BLOOD PRESSURE: 146 MMHG | DIASTOLIC BLOOD PRESSURE: 75 MMHG | HEART RATE: 86 BPM | WEIGHT: 125 LBS | OXYGEN SATURATION: 93 % | TEMPERATURE: 98 F
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
## 2024-01-24 DIAGNOSIS — R41.3 OTHER AMNESIA: ICD-10-CM

## 2024-01-24 DIAGNOSIS — Z29.9 ENCOUNTER FOR PROPHYLACTIC MEASURES, UNSPECIFIED: ICD-10-CM

## 2024-01-24 DIAGNOSIS — K59.00 CONSTIPATION, UNSPECIFIED: ICD-10-CM

## 2024-01-24 DIAGNOSIS — M19.90 UNSPECIFIED OSTEOARTHRITIS, UNSPECIFIED SITE: ICD-10-CM

## 2024-01-24 LAB
ALBUMIN SERPL ELPH-MCNC: 3.6 G/DL — SIGNIFICANT CHANGE UP (ref 3.3–5)
ALP SERPL-CCNC: 84 U/L — SIGNIFICANT CHANGE UP (ref 40–120)
ALT FLD-CCNC: 15 U/L — SIGNIFICANT CHANGE UP (ref 10–45)
AMORPH PHOS CRY # URNS: PRESENT
ANION GAP SERPL CALC-SCNC: 9 MMOL/L — SIGNIFICANT CHANGE UP (ref 5–17)
APPEARANCE UR: ABNORMAL
AST SERPL-CCNC: 25 U/L — SIGNIFICANT CHANGE UP (ref 10–40)
BACTERIA # UR AUTO: ABNORMAL /HPF
BASOPHILS # BLD AUTO: 0 K/UL — SIGNIFICANT CHANGE UP (ref 0–0.2)
BASOPHILS NFR BLD AUTO: 0 % — SIGNIFICANT CHANGE UP (ref 0–2)
BILIRUB SERPL-MCNC: 0.4 MG/DL — SIGNIFICANT CHANGE UP (ref 0.2–1.2)
BILIRUB UR-MCNC: NEGATIVE — SIGNIFICANT CHANGE UP
BUN SERPL-MCNC: 19 MG/DL — SIGNIFICANT CHANGE UP (ref 7–23)
CALCIUM SERPL-MCNC: 9 MG/DL — SIGNIFICANT CHANGE UP (ref 8.4–10.5)
CAST: 4 /LPF — SIGNIFICANT CHANGE UP (ref 0–4)
CHLORIDE SERPL-SCNC: 100 MMOL/L — SIGNIFICANT CHANGE UP (ref 96–108)
CO2 SERPL-SCNC: 28 MMOL/L — SIGNIFICANT CHANGE UP (ref 22–31)
COLOR SPEC: YELLOW — SIGNIFICANT CHANGE UP
CREAT SERPL-MCNC: 0.7 MG/DL — SIGNIFICANT CHANGE UP (ref 0.5–1.3)
DIFF PNL FLD: ABNORMAL
EGFR: 81 ML/MIN/1.73M2 — SIGNIFICANT CHANGE UP
EOSINOPHIL # BLD AUTO: 0 K/UL — SIGNIFICANT CHANGE UP (ref 0–0.5)
EOSINOPHIL NFR BLD AUTO: 0 % — SIGNIFICANT CHANGE UP (ref 0–6)
FINE GRAN CASTS #/AREA URNS AUTO: PRESENT
FLUAV H1 2009 PAND RNA SPEC QL NAA+PROBE: DETECTED
GIANT PLATELETS BLD QL SMEAR: PRESENT — SIGNIFICANT CHANGE UP
GLUCOSE SERPL-MCNC: 115 MG/DL — HIGH (ref 70–99)
GLUCOSE UR QL: NEGATIVE MG/DL — SIGNIFICANT CHANGE UP
HCT VFR BLD CALC: 33.4 % — LOW (ref 34.5–45)
HGB BLD-MCNC: 11 G/DL — LOW (ref 11.5–15.5)
KETONES UR-MCNC: 15 MG/DL
LEUKOCYTE ESTERASE UR-ACNC: NEGATIVE — SIGNIFICANT CHANGE UP
LYMPHOCYTES # BLD AUTO: 0.22 K/UL — LOW (ref 1–3.3)
LYMPHOCYTES # BLD AUTO: 4.4 % — LOW (ref 13–44)
MANUAL SMEAR VERIFICATION: SIGNIFICANT CHANGE UP
MCHC RBC-ENTMCNC: 32.9 GM/DL — SIGNIFICANT CHANGE UP (ref 32–36)
MCHC RBC-ENTMCNC: 33.1 PG — SIGNIFICANT CHANGE UP (ref 27–34)
MCV RBC AUTO: 100.6 FL — HIGH (ref 80–100)
MONOCYTES # BLD AUTO: 0.58 K/UL — SIGNIFICANT CHANGE UP (ref 0–0.9)
MONOCYTES NFR BLD AUTO: 11.4 % — SIGNIFICANT CHANGE UP (ref 2–14)
MUCOUS THREADS # UR AUTO: PRESENT
NEUTROPHILS # BLD AUTO: 4.25 K/UL — SIGNIFICANT CHANGE UP (ref 1.8–7.4)
NEUTROPHILS NFR BLD AUTO: 84.2 % — HIGH (ref 43–77)
NITRITE UR-MCNC: NEGATIVE — SIGNIFICANT CHANGE UP
OVALOCYTES BLD QL SMEAR: SLIGHT — SIGNIFICANT CHANGE UP
PH UR: 8 — SIGNIFICANT CHANGE UP (ref 5–8)
PLAT MORPH BLD: ABNORMAL
PLATELET # BLD AUTO: 206 K/UL — SIGNIFICANT CHANGE UP (ref 150–400)
POIKILOCYTOSIS BLD QL AUTO: SLIGHT — SIGNIFICANT CHANGE UP
POLYCHROMASIA BLD QL SMEAR: SLIGHT — SIGNIFICANT CHANGE UP
POTASSIUM SERPL-MCNC: 4 MMOL/L — SIGNIFICANT CHANGE UP (ref 3.5–5.3)
POTASSIUM SERPL-SCNC: 4 MMOL/L — SIGNIFICANT CHANGE UP (ref 3.5–5.3)
PROCALCITONIN SERPL-MCNC: 0.09 NG/ML — SIGNIFICANT CHANGE UP (ref 0.02–0.1)
PROT SERPL-MCNC: 7 G/DL — SIGNIFICANT CHANGE UP (ref 6–8.3)
PROT UR-MCNC: 100 MG/DL
RAPID RVP RESULT: DETECTED
RBC # BLD: 3.32 M/UL — LOW (ref 3.8–5.2)
RBC # FLD: 12.7 % — SIGNIFICANT CHANGE UP (ref 10.3–14.5)
RBC BLD AUTO: ABNORMAL
RBC CASTS # UR COMP ASSIST: 79 /HPF — HIGH (ref 0–4)
RVP NOTIFY: SIGNIFICANT CHANGE UP
SARS-COV-2 RNA SPEC QL NAA+PROBE: SIGNIFICANT CHANGE UP
SODIUM SERPL-SCNC: 137 MMOL/L — SIGNIFICANT CHANGE UP (ref 135–145)
SP GR SPEC: 1.02 — SIGNIFICANT CHANGE UP (ref 1–1.03)
SQUAMOUS # UR AUTO: 7 /HPF — HIGH (ref 0–5)
UROBILINOGEN FLD QL: 1 MG/DL — SIGNIFICANT CHANGE UP (ref 0.2–1)
WBC # BLD: 5.05 K/UL — SIGNIFICANT CHANGE UP (ref 3.8–10.5)
WBC # FLD AUTO: 5.05 K/UL — SIGNIFICANT CHANGE UP (ref 3.8–10.5)
WBC UR QL: 3 /HPF — SIGNIFICANT CHANGE UP (ref 0–5)

## 2024-01-24 PROCEDURE — 73562 X-RAY EXAM OF KNEE 3: CPT | Mod: 26,50

## 2024-01-24 PROCEDURE — 99285 EMERGENCY DEPT VISIT HI MDM: CPT

## 2024-01-24 PROCEDURE — 73522 X-RAY EXAM HIPS BI 3-4 VIEWS: CPT | Mod: 26

## 2024-01-24 PROCEDURE — 71045 X-RAY EXAM CHEST 1 VIEW: CPT | Mod: 26

## 2024-01-24 PROCEDURE — 99223 1ST HOSP IP/OBS HIGH 75: CPT | Mod: GC,AI

## 2024-01-24 RX ORDER — LIDOCAINE 4 G/100G
1 CREAM TOPICAL EVERY 24 HOURS
Refills: 0 | Status: DISCONTINUED | OUTPATIENT
Start: 2024-01-24 | End: 2024-01-27

## 2024-01-24 RX ORDER — KETOROLAC TROMETHAMINE 30 MG/ML
15 SYRINGE (ML) INJECTION EVERY 6 HOURS
Refills: 0 | Status: DISCONTINUED | OUTPATIENT
Start: 2024-01-24 | End: 2024-01-25

## 2024-01-24 RX ORDER — ENOXAPARIN SODIUM 100 MG/ML
40 INJECTION SUBCUTANEOUS EVERY 24 HOURS
Refills: 0 | Status: DISCONTINUED | OUTPATIENT
Start: 2024-01-24 | End: 2024-01-27

## 2024-01-24 RX ORDER — INFLUENZA VIRUS VACCINE 15; 15; 15; 15 UG/.5ML; UG/.5ML; UG/.5ML; UG/.5ML
0.7 SUSPENSION INTRAMUSCULAR ONCE
Refills: 0 | Status: DISCONTINUED | OUTPATIENT
Start: 2024-01-24 | End: 2024-01-27

## 2024-01-24 RX ORDER — SODIUM CHLORIDE 9 MG/ML
1000 INJECTION INTRAMUSCULAR; INTRAVENOUS; SUBCUTANEOUS ONCE
Refills: 0 | Status: COMPLETED | OUTPATIENT
Start: 2024-01-24 | End: 2024-01-24

## 2024-01-24 RX ORDER — ACETAMINOPHEN 500 MG
650 TABLET ORAL ONCE
Refills: 0 | Status: COMPLETED | OUTPATIENT
Start: 2024-01-24 | End: 2024-01-24

## 2024-01-24 RX ORDER — ACETAMINOPHEN 500 MG
650 TABLET ORAL EVERY 6 HOURS
Refills: 0 | Status: DISCONTINUED | OUTPATIENT
Start: 2024-01-24 | End: 2024-01-27

## 2024-01-24 RX ORDER — ASPIRIN/CALCIUM CARB/MAGNESIUM 324 MG
1 TABLET ORAL
Qty: 0 | Refills: 0 | DISCHARGE

## 2024-01-24 RX ORDER — ONDANSETRON 8 MG/1
4 TABLET, FILM COATED ORAL EVERY 8 HOURS
Refills: 0 | Status: DISCONTINUED | OUTPATIENT
Start: 2024-01-24 | End: 2024-01-27

## 2024-01-24 RX ORDER — KETOROLAC TROMETHAMINE 30 MG/ML
15 SYRINGE (ML) INJECTION ONCE
Refills: 0 | Status: DISCONTINUED | OUTPATIENT
Start: 2024-01-24 | End: 2024-01-24

## 2024-01-24 RX ORDER — LANOLIN ALCOHOL/MO/W.PET/CERES
3 CREAM (GRAM) TOPICAL AT BEDTIME
Refills: 0 | Status: DISCONTINUED | OUTPATIENT
Start: 2024-01-24 | End: 2024-01-27

## 2024-01-24 RX ADMIN — Medication 15 MILLIGRAM(S): at 10:18

## 2024-01-24 RX ADMIN — Medication 15 MILLIGRAM(S): at 15:25

## 2024-01-24 RX ADMIN — SODIUM CHLORIDE 1000 MILLILITER(S): 9 INJECTION INTRAMUSCULAR; INTRAVENOUS; SUBCUTANEOUS at 10:09

## 2024-01-24 RX ADMIN — Medication 650 MILLIGRAM(S): at 15:25

## 2024-01-24 RX ADMIN — Medication 650 MILLIGRAM(S): at 10:18

## 2024-01-24 RX ADMIN — ENOXAPARIN SODIUM 40 MILLIGRAM(S): 100 INJECTION SUBCUTANEOUS at 16:32

## 2024-01-24 RX ADMIN — Medication 100 MILLIGRAM(S): at 18:56

## 2024-01-24 NOTE — ED PROVIDER NOTE - NSCAREINITIATED _GEN_ER
Lizzy Lerner(Attending)
PAST MEDICAL HISTORY:  Autism     UTI (urinary tract infection)     Wheezing without diagnosis of asthma     
Class I (easy) - visualization of the soft palate, fauces, uvula, and both anterior and posterior pillars

## 2024-01-24 NOTE — H&P ADULT - HISTORY OF PRESENT ILLNESS
89 yo F with past medical history of urinary retention/overactive bladder, breast cancer (s/p R mastectomy ~15 years ago), depression/anxiety (on Lexapro), on a "memory pill", osteoporosis with chronic right knee and LBP, lives at home alone with home health aides for 4 hours on Monday, wedneday, Friday, walks with a walker at home and uses a wheelchair outside p/w fall. Patient notes that she was getting out of bed this AM and her knees buckled, causing her to fall onto her knees. pt denies dizziness, head strike, or loc. c/o generalized weakness.   89 yo F with past medical history of urinary retention/overactive bladder, breast cancer (s/p R mastectomy ~15 years ago), depression/anxiety (on Lexapro), on an OTC  "memory pill" but does not remember the name, severe R  knee osteoarthritis receiving cortisone injection, osteoporosis with LBP, lives at home alone with but has HHA visit [facility] from 8am-noon, walks with a walker at home and uses a wheelchair outside, presenting for mechanical fall. Pt reports that this morning she could not get out of bed and had to wait for HHA to arrive to help her up but fell to her hands and knees during her attempt at moving to her walker. She called emergency services with her alert necklace and was brought to the ED. Of note, the pt reports having URI symptoms over the past weak with cough, sneezing, and rhinorrhea. Denies hitting her head, LOC, prodromal symptoms/dizziness. Denies F/C/N/V/D but admits to constipation and reports not stooling for 2 days which she attributes to poor PO intake iso URI. ROS positive for weakness, cough, wheezing, constipation.     ED course:  Vitals  T 98.2, HR 86, /75, SpO2 93% RA, RR 18  Labs  WBC 5.05, Hgb 11, Plt 206, Na 137, K 4.0, Cl 100, CO2 28, BUN/Crt 19/0.70, Alk phos 84, AST/ALT 25/15, UA+ RBCs, casts   EKG NSR 89bpm anterior infarct undetermined age   Imaging  Xray hip w/ pelvis: No acute fracture or dislocation. Redemonstration bilateral pubic rami old fractures. Redemonstration bilateral hip joint space mild narrowing, lower lumbar spine degenerative changes. Rectal feces/impaction.  Xray knee: impression: No acute fracture or dislocation. Redemonstration bilateral pubic rami old fractures. Redemonstration bilateral hip joint space mild narrowing, lower lumbar spine degenerative changes. Rectal feces/impaction.  Interventions  Tylenol 650 x1, Ketorolac 15 IVP x1, 1L NS x1

## 2024-01-24 NOTE — ED ADULT TRIAGE NOTE - CHIEF COMPLAINT QUOTE
pt presents to ER, states she was getting out of bed this AM and her knees buckled, causing her to fall onto her knees. pt denies dizziness, head strike, or loc. c/o generalized weakness.

## 2024-01-24 NOTE — H&P ADULT - PROBLEM SELECTOR PLAN 1
Pt presenting for fall after struggling to get out of bed, requiring help from HHA. Pt usually able to get out of bed to walker but today felt too weak to get up and fell to her hands and knees as her HHA was helping her up. Denies striking her head, LOC, prodromal sx, post-ictal state. Admits to having a URI over the past week with coughing, sneezing, rhinorrhea.   - F/u CXR  - F/u RVP  - F/u COVID  - F/u PT recs  - Pain mgmt: tylenol for mild, ketorolac for moderate, lidocaine patch

## 2024-01-24 NOTE — H&P ADULT - NSHPSOCIALHISTORY_GEN_ALL_CORE
Pt lives alone but has HHA visit 4 days a week from 8am-12pm. Has family in [place] that will occasionally check on her, Considers herself a "loner" and would rather not move to a nursing home.

## 2024-01-24 NOTE — ED ADULT NURSE NOTE - CAS DISCH BELONGINGS RETURNED
pt reports not havign phone, charge aware, efforts made to locate, called phone- not found, pt aware

## 2024-01-24 NOTE — H&P ADULT - NSHPPHYSICALEXAM_GEN_ALL_CORE
.  VITAL SIGNS:  T(C): 36.7 (01-24-24 @ 12:54), Max: 36.8 (01-24-24 @ 09:23)  T(F): 98.1 (01-24-24 @ 12:54), Max: 98.2 (01-24-24 @ 09:23)  HR: 77 (01-24-24 @ 12:54) (77 - 86)  BP: 111/55 (01-24-24 @ 12:54) (111/55 - 146/75)  BP(mean): --  RR: 18 (01-24-24 @ 12:54) (18 - 18)  SpO2: 93% (01-24-24 @ 12:54) (93% - 93%)  Wt(kg): --    PHYSICAL EXAM:    Constitutional: WDWN resting comfortably in bed; NAD  Head: NC/AT  Eyes: PERRL, EOMI, anicteric sclera  ENT: no nasal discharge; uvula midline, no oropharyngeal erythema or exudates; MMM  Neck: supple; no JVD or thyromegaly  Respiratory: CTA B/L; no W/R/R, no retractions  Cardiac: +S1/S2; RRR; no M/R/G; PMI non-displaced  Gastrointestinal: abdomen soft, NT/ND; no rebound or guarding; +BSx4  Back: spine midline, no bony tenderness or step-offs; no CVAT B/L  Extremities: WWP, no clubbing or cyanosis; no peripheral edema  Musculoskeletal: NROM x4; no joint swelling, tenderness or erythema  Vascular: 2+ radial, femoral, DP/PT pulses B/L  Dermatologic: skin warm, dry and intact; no rashes, wounds, or scars  Lymphatic: no submandibular or cervical LAD  Neurologic: AAOx3; CNII-XII grossly intact; no focal deficits  Psychiatric: affect and characteristics of appearance, verbalizations, behaviors are appropriate .  VITAL SIGNS:  T(C): 36.7 (01-24-24 @ 12:54), Max: 36.8 (01-24-24 @ 09:23)  T(F): 98.1 (01-24-24 @ 12:54), Max: 98.2 (01-24-24 @ 09:23)  HR: 77 (01-24-24 @ 12:54) (77 - 86)  BP: 111/55 (01-24-24 @ 12:54) (111/55 - 146/75)  BP(mean): --  RR: 18 (01-24-24 @ 12:54) (18 - 18)  SpO2: 93% (01-24-24 @ 12:54) (93% - 93%)  Wt(kg): --    PHYSICAL EXAM:    Constitutional: Elderly female. WDWN resting comfortably in bed; NAD  Head: NC/AT  Eyes: PERRL, EOMI, anicteric sclera  ENT: no nasal discharge; uvula midline, no oropharyngeal erythema or exudates; MMM  Neck: supple; no JVD or thyromegaly  Respiratory: B/l crackles at LLB with insp and exp wheezing  Cardiac: +S1/S2; RRR; no M/R/G; PMI non-displaced  Gastrointestinal: abdomen soft, distended NT; no rebound or guarding; +BSx4  Extremities: WWP, no clubbing or cyanosis; no peripheral edema, severe tenderness over R knee  Dermatologic: skin warm, dry and intact; no rashes, wounds, or scars  Neurologic: AAOx3; CNII-XII grossly intact; no focal deficits  Psychiatric: affect and characteristics of appearance, verbalizations, behaviors are appropriate

## 2024-01-24 NOTE — H&P ADULT - PROBLEM SELECTOR PLAN 2
Pt with severe osteoarthritis of R knee s/p cortisone injections. Says she takes tylenol every day for the pain and is due to see her orthopedic surgeon for another cortisone injection this week.  - pain mgmt as above

## 2024-01-24 NOTE — PATIENT PROFILE ADULT - FALL HARM RISK - HARM RISK INTERVENTIONS

## 2024-01-24 NOTE — ED PROVIDER NOTE - CLINICAL SUMMARY MEDICAL DECISION MAKING FREE TEXT BOX
89 yo F with past medical history of urinary retention/overactive bladder, breast cancer (s/p R mastectomy ~15 years ago), depression/anxiety (on Lexapro), on a "memory pill", osteoporosis with chronic right knee and LBP, lives at home alone with home health aides for 4 hours on Monday, wedneday, Friday, walks with a walker at home and uses a wheelchair outside p/w fall. Patient notes that she was getting out of bed this AM and her knees buckled, causing her to fall onto her knees. Pt is c/o generalized weakness. Pt denies dizziness, head strike, or loc, fevers, cough, abd pain, N,V,D, urinary sxs.    ED course: VS noted. Pt afebrile and mildly hypertensive. Labs/studies noted. Xrays with no acute fx/dislocation. Patient unable to stand up without help- endorses generalized weakness. Is an unsafe discharge as she lives alone and has no help for several hours in the day. Admitted. Stable in ED.

## 2024-01-24 NOTE — H&P ADULT - NSHPLABSRESULTS_GEN_ALL_CORE
.  LABS:                         11.0   5.05  )-----------( 206      ( 2024 09:36 )             33.4         137  |  100  |  19  ----------------------------<  115<H>  4.0   |  28  |  0.70    Ca    9.0      2024 09:36    TPro  7.0  /  Alb  3.6  /  TBili  0.4  /  DBili  x   /  AST  25  /  ALT  15  /  AlkPhos  84        Urinalysis Basic - ( 2024 09:36 )    Color: Yellow / Appearance: Turbid / S.021 / pH: x  Gluc: 115 mg/dL / Ketone: 15 mg/dL  / Bili: Negative / Urobili: 1.0 mg/dL   Blood: x / Protein: 100 mg/dL / Nitrite: Negative   Leuk Esterase: Negative / RBC: 79 /HPF / WBC 3 /HPF   Sq Epi: x / Non Sq Epi: 7 /HPF / Bacteria: Many /HPF                RADIOLOGY, EKG & ADDITIONAL TESTS: Reviewed.

## 2024-01-24 NOTE — H&P ADULT - ATTENDING COMMENTS
Patient reports trying to get out of bed this morning and her legs gave up, denies trauma to head, no LOC, no chest pain or palpitations, Reports weakness over the last week with ongoing URI, Denies other complaints, no events reported    General: AOX3, NAD, lying in bed, speaking in full sentences, no labored breathing on RA  HEENT: AT/NC, no facial asymmetry, SARAHI  Lungs: no crackles, no wheezes  Abdomen: soft, non-tender  Extremities: warm, no edema, no tenderness, no focal deficit     Labs, imaging reviewed    URI  Fall  OA knee    Will get CXR and RVP in view of URI, get Procalcitonin. Symptomatic management  Pain control, PT evaluation  DVT ppx

## 2024-01-24 NOTE — H&P ADULT - PROBLEM SELECTOR PLAN 4
Pt reports not passing BM in 2 days iso poor PO intake 2/2 recent URI  - Started bowel regimen  - Monitor BMs

## 2024-01-24 NOTE — ED PROVIDER NOTE - OBJECTIVE STATEMENT
89 yo F with past medical history of urinary retention/overactive bladder, breast cancer (s/p R mastectomy ~15 years ago), depression/anxiety (on Lexapro), on a "memory pill", osteoporosis with chronic right knee and LBP, lives at home alone with home health aides for 4 hours on Monday, wedneday, Friday, walks with a walker at home and uses a wheelchair outside p/w fall. Patient notes that she was getting out of bed this AM and her knees buckled, causing her to fall onto her knees. pt denies dizziness, head strike, or loc. c/o generalized weakness. 89 yo F with past medical history of urinary retention/overactive bladder, breast cancer (s/p R mastectomy ~15 years ago), depression/anxiety (on Lexapro), on a "memory pill", osteoporosis with chronic right knee and LBP, lives at home alone with home health aides for 4 hours on Monday, wedneday, Friday, walks with a walker at home and uses a wheelchair outside p/w fall. Patient notes that she was getting out of bed this AM and her knees buckled, causing her to fall onto her knees. Pt is c/o generalized weakness. Pt denies dizziness, head strike, or loc, fevers, cough, abd pain, N,V,D, urinary sxs.

## 2024-01-24 NOTE — H&P ADULT - PROBLEM SELECTOR PLAN 5
F: S/p 1L NS  E: K>4, Mg >2  N: Regular diet  D: Lovenox  Gi: None  Dispo: [facility]

## 2024-01-24 NOTE — H&P ADULT - PROBLEM SELECTOR PLAN 3
Pt reports memory changes over the past year with more forgetfulness. Believes she is in the early stages of dementia. Takes an OTC medication for memory but forgot its name.   - Outpatient neurology follow-up

## 2024-01-24 NOTE — H&P ADULT - PROBLEM SELECTOR PROBLEM 5
1. Have you been to the ER, urgent care clinic since your last visit? Hospitalized since your last visit? No     2. Have you seen or consulted any other health care providers outside of the [address] [place] [address] since your last visit? Include any pap smears or colon screening. No     complains of  Recurrent pain right groin. Denies pain at this time. Prophylactic measure

## 2024-01-24 NOTE — ED ADULT NURSE NOTE - NSFALLHARMRISKINTERV_ED_ALL_ED
Assistance OOB with selected safe patient handling equipment if applicable/Communicate risk of Fall with Harm to all staff, patient, and family/Monitor gait and stability/Provide patient with walking aids/Provide visual cue: red socks, yellow wristband, yellow gown, etc/Reinforce activity limits and safety measures with patient and family/Bed in lowest position, wheels locked, appropriate side rails in place/Call bell, personal items and telephone in reach/Instruct patient to call for assistance before getting out of bed/chair/stretcher/Non-slip footwear applied when patient is off stretcher/[place] to call system/Physically safe environment - no spills, clutter or unnecessary equipment/Purposeful Proactive Rounding/Room/bathroom lighting operational, light cord in reach

## 2024-01-24 NOTE — H&P ADULT - ASSESSMENT
89F PMH of urinary retention/overactive bladder, breast cancer, anxiety/depression, osteoarthritis, osteoporosis presenting with fall admitted for further management

## 2024-01-24 NOTE — ED ADULT NURSE NOTE - OBJECTIVE STATEMENT
Patient presents to ER JODIE aox3, speaking in full sentences. She states while getting out of bed this morning with assistance from HHA, her legs lost strength and she fell to her knees. Denies hurting herself or head strike. She states she as an orthopedic issue with her legs in which she cannot elaborate.NKDA.

## 2024-01-25 DIAGNOSIS — J11.1 INFLUENZA DUE TO UNIDENTIFIED INFLUENZA VIRUS WITH OTHER RESPIRATORY MANIFESTATIONS: ICD-10-CM

## 2024-01-25 LAB
ALBUMIN SERPL ELPH-MCNC: 3.4 G/DL — SIGNIFICANT CHANGE UP (ref 3.3–5)
ALP SERPL-CCNC: 75 U/L — SIGNIFICANT CHANGE UP (ref 40–120)
ALT FLD-CCNC: 33 U/L — SIGNIFICANT CHANGE UP (ref 10–45)
ANION GAP SERPL CALC-SCNC: 17 MMOL/L — SIGNIFICANT CHANGE UP (ref 5–17)
AST SERPL-CCNC: 61 U/L — HIGH (ref 10–40)
BASOPHILS # BLD AUTO: 0.02 K/UL — SIGNIFICANT CHANGE UP (ref 0–0.2)
BASOPHILS NFR BLD AUTO: 0.5 % — SIGNIFICANT CHANGE UP (ref 0–2)
BILIRUB SERPL-MCNC: 0.3 MG/DL — SIGNIFICANT CHANGE UP (ref 0.2–1.2)
BUN SERPL-MCNC: 22 MG/DL — SIGNIFICANT CHANGE UP (ref 7–23)
CALCIUM SERPL-MCNC: 8 MG/DL — LOW (ref 8.4–10.5)
CHLORIDE SERPL-SCNC: 95 MMOL/L — LOW (ref 96–108)
CO2 SERPL-SCNC: 24 MMOL/L — SIGNIFICANT CHANGE UP (ref 22–31)
CREAT SERPL-MCNC: 0.62 MG/DL — SIGNIFICANT CHANGE UP (ref 0.5–1.3)
CULTURE RESULTS: SIGNIFICANT CHANGE UP
EGFR: 83 ML/MIN/1.73M2 — SIGNIFICANT CHANGE UP
EOSINOPHIL # BLD AUTO: 0 K/UL — SIGNIFICANT CHANGE UP (ref 0–0.5)
EOSINOPHIL NFR BLD AUTO: 0 % — SIGNIFICANT CHANGE UP (ref 0–6)
GLUCOSE SERPL-MCNC: 81 MG/DL — SIGNIFICANT CHANGE UP (ref 70–99)
HCT VFR BLD CALC: 34.2 % — LOW (ref 34.5–45)
HGB BLD-MCNC: 11.4 G/DL — LOW (ref 11.5–15.5)
IMM GRANULOCYTES NFR BLD AUTO: 0.5 % — SIGNIFICANT CHANGE UP (ref 0–0.9)
LYMPHOCYTES # BLD AUTO: 0.53 K/UL — LOW (ref 1–3.3)
LYMPHOCYTES # BLD AUTO: 13.4 % — SIGNIFICANT CHANGE UP (ref 13–44)
MCHC RBC-ENTMCNC: 33.1 PG — SIGNIFICANT CHANGE UP (ref 27–34)
MCHC RBC-ENTMCNC: 33.3 GM/DL — SIGNIFICANT CHANGE UP (ref 32–36)
MCV RBC AUTO: 99.4 FL — SIGNIFICANT CHANGE UP (ref 80–100)
MONOCYTES # BLD AUTO: 0.58 K/UL — SIGNIFICANT CHANGE UP (ref 0–0.9)
MONOCYTES NFR BLD AUTO: 14.6 % — HIGH (ref 2–14)
NEUTROPHILS # BLD AUTO: 2.81 K/UL — SIGNIFICANT CHANGE UP (ref 1.8–7.4)
NEUTROPHILS NFR BLD AUTO: 71 % — SIGNIFICANT CHANGE UP (ref 43–77)
NRBC # BLD: 0 /100 WBCS — SIGNIFICANT CHANGE UP (ref 0–0)
PLATELET # BLD AUTO: 178 K/UL — SIGNIFICANT CHANGE UP (ref 150–400)
POTASSIUM SERPL-MCNC: 3.5 MMOL/L — SIGNIFICANT CHANGE UP (ref 3.5–5.3)
POTASSIUM SERPL-SCNC: 3.5 MMOL/L — SIGNIFICANT CHANGE UP (ref 3.5–5.3)
PROT SERPL-MCNC: 6.8 G/DL — SIGNIFICANT CHANGE UP (ref 6–8.3)
RBC # BLD: 3.44 M/UL — LOW (ref 3.8–5.2)
RBC # FLD: 12.7 % — SIGNIFICANT CHANGE UP (ref 10.3–14.5)
SODIUM SERPL-SCNC: 136 MMOL/L — SIGNIFICANT CHANGE UP (ref 135–145)
SPECIMEN SOURCE: SIGNIFICANT CHANGE UP
WBC # BLD: 3.96 K/UL — SIGNIFICANT CHANGE UP (ref 3.8–10.5)
WBC # FLD AUTO: 3.96 K/UL — SIGNIFICANT CHANGE UP (ref 3.8–10.5)

## 2024-01-25 PROCEDURE — 99232 SBSQ HOSP IP/OBS MODERATE 35: CPT | Mod: GC

## 2024-01-25 RX ORDER — SENNA PLUS 8.6 MG/1
2 TABLET ORAL AT BEDTIME
Refills: 0 | Status: DISCONTINUED | OUTPATIENT
Start: 2024-01-25 | End: 2024-01-25

## 2024-01-25 RX ORDER — POLYETHYLENE GLYCOL 3350 17 G/17G
17 POWDER, FOR SOLUTION ORAL ONCE
Refills: 0 | Status: DISCONTINUED | OUTPATIENT
Start: 2024-01-25 | End: 2024-01-25

## 2024-01-25 RX ORDER — IPRATROPIUM/ALBUTEROL SULFATE 18-103MCG
3 AEROSOL WITH ADAPTER (GRAM) INHALATION EVERY 6 HOURS
Refills: 0 | Status: DISCONTINUED | OUTPATIENT
Start: 2024-01-25 | End: 2024-01-27

## 2024-01-25 RX ADMIN — Medication 3 MILLILITER(S): at 23:37

## 2024-01-25 RX ADMIN — Medication 3 MILLILITER(S): at 12:07

## 2024-01-25 RX ADMIN — Medication 75 MILLIGRAM(S): at 12:07

## 2024-01-25 RX ADMIN — Medication 3 MILLILITER(S): at 18:10

## 2024-01-25 RX ADMIN — Medication 10 MILLIGRAM(S): at 16:09

## 2024-01-25 RX ADMIN — ENOXAPARIN SODIUM 40 MILLIGRAM(S): 100 INJECTION SUBCUTANEOUS at 16:09

## 2024-01-25 NOTE — OCCUPATIONAL THERAPY INITIAL EVALUATION ADULT - PERTINENT HX OF CURRENT PROBLEM, REHAB EVAL
89 yo F with past medical history of urinary retention/overactive bladder, breast cancer (s/p R mastectomy ~15 years ago), depression/anxiety (on Lexapro), on an OTC  "memory pill" but does not remember the name, severe R  knee osteoarthritis receiving cortisone injection, osteoporosis with LBP, lives at home alone with but has HHA visit [facility] from 8am-noon, walks with a walker at home and uses a wheelchair outside, presenting for mechanical fall. Pt reports that this morning she could not get out of bed and had to wait for HHA to arrive to help her up but fell to her hands and knees during her attempt at moving to her walker. She called emergency services with her alert necklace and was brought to the ED. Of note, the pt reports having URI symptoms over the past weak with cough, sneezing, and rhinorrhea. Denies hitting her head, LOC, prodromal symptoms/dizziness. Denies F/C/N/V/D but admits to constipation and reports not stooling for 2 days which she attributes to poor PO intake iso URI. ROS positive for weakness, cough, wheezing, constipation.

## 2024-01-25 NOTE — OCCUPATIONAL THERAPY INITIAL EVALUATION ADULT - GENERAL OBSERVATIONS, REHAB EVAL
Pt received semi-supine in bed, +heplock, NAD, and agreeable to OT. PT present for evaluation. Cleared by ROSEMARIE Arellano to see. Pt received semi-supine in bed, +heplock, NAD, and agreeable to OT. PT Calista present for evaluation. Cleared by ROSEMARIE Arellano to see.

## 2024-01-25 NOTE — OCCUPATIONAL THERAPY INITIAL EVALUATION ADULT - MANUAL MUSCLE TESTING RESULTS, REHAB EVAL
UE/LE: ~3+/5 through functional mobility testing against gravity/no strength deficits were identified UE/LE: >3+/5 through functional mobility testing against gravity/no strength deficits were identified

## 2024-01-25 NOTE — PROGRESS NOTE ADULT - PROBLEM SELECTOR PLAN 3
History of breast cancer, s/p mastectomy of R breast.   - per pharmacy, not currently taking anastrazole  - f/u outpatient Pt reports not passing BM in 2 days iso poor PO intake 2/2 recent URI    - Start dulcolax supp for ?impaction on admission imaging  - C/w senna  - C/w miralax

## 2024-01-25 NOTE — PHYSICAL THERAPY INITIAL EVALUATION ADULT - TRANSFER SAFETY CONCERNS NOTED: SIT/STAND, REHAB EVAL
significant retropulsion/decreased safety awareness/losing balance/decreased weight-shifting ability

## 2024-01-25 NOTE — PROGRESS NOTE ADULT - PROBLEM SELECTOR PLAN 2
#Pain control:   Currently complaining of back pain (worse on left side) s/p fall.   - continue with Tylenol 650 mg PO q6 for mild pain   - continue with Oxycodone 5 mg PO TID for severe pain + Influenza AH3, mod-severe exp. wheeze in all 4 quadrants  - CXR: perihilar opacities    - Start Tamiflu  - Duonebs q6h  - Incentive spirometry Mechanical ISO URI, w/ possible arrhythmogenic component | Pt presenting for fall after struggling to get out of bed, requiring help from HHA. Pt usually able to get out of bed to walker but today felt too weak to get up and fell to her hands and knees as her HHA was helping her up. Denies striking her head, LOC, prodromal sx, post-ictal state. Admits to having a URI over the past week with coughing, sneezing, rhinorrhea.   - 01/25: irregular rhythm, w/ "skipped beats" on exam. Pt denies palpitations.     - F/u EKG  - F/u PT/OT recs

## 2024-01-25 NOTE — OCCUPATIONAL THERAPY INITIAL EVALUATION ADULT - IMPAIRED TRANSFERS: SIT/STAND, REHAB EVAL
significant retropulsion/impaired balance/impaired coordination/impaired postural control/decreased strength

## 2024-01-25 NOTE — PHYSICAL THERAPY INITIAL EVALUATION ADULT - ADDITIONAL COMMENTS
Pt lives alone in elevator apt, [facility] 4 days/wk 8am-12pm to assist with bathing, cooking, cleaning, shopping. Pt previously independent with short distance ambulation within the apt using rollator.

## 2024-01-25 NOTE — PROGRESS NOTE ADULT - PROBLEM SELECTOR PLAN 1
#Status-post mechanical fall:   Patient describes that she tripped and fell when feeding her cat. Denies any head trauma or LOC. Denies any CP or palpitations. Patient states that she lives at home alone, has a , no HHA.  - EKG showed NSR in ED, no new ischemic changes   - CT head with no acute abnormalities  - CT lumbar spine showed no acute fracture of the visualized osseous structures  - CT cervical spine showed no acute fracture or malalignment and moderate cervical spondylosis, most prominent at the level of C3-C4   - PT recommending DOT Pt presenting for fall after struggling to get out of bed, requiring help from HHA. Pt usually able to get out of bed to walker but today felt too weak to get up and fell to her hands and knees as her HHA was helping her up. Denies striking her head, LOC, prodromal sx, post-ictal state. Admits to having a URI over the past week with coughing, sneezing, rhinorrhea.   - CXR     - F/u RVP  - F/u COVID  - F/u PT recs  - Pain mgmt: tylenol for mild, ketorolac for moderate, lidocaine patch. P/w 2d of cough, general malaise. Found + Influenza AH3, w/ mod-severe exp. wheeze b/l on exam.  - Admission CXR: perihilar opacities  - CrCl 01/25 : ~44.0    - Start Tamiflu 75mg qd today, followed by 30mg BID for total 5d - renal dosing, per pharm  - Start Duonebs q6h  - Incentive spirometry

## 2024-01-25 NOTE — PROGRESS NOTE ADULT - PROBLEM SELECTOR PLAN 4
#Urinary retention:   History of urinary retention and overactive bladder, for which patient takes Detrol. Patient states that she has been incontinent for 2 years and wears diapers.   - continue with oxybutynin 5 mg PO BID in exchange for home Detrol   - monitor UOP and bladder scan if retaining Pt with severe osteoarthritis of R knee s/p cortisone injections. Says she takes tylenol every day for the pain and is due to see her orthopedic surgeon for another cortisone injection this week.    - Pain mgmt as below:        - D/c ketorolac for moderate pain, given age       - C/w tylenol for mild pain        - C/w lidocaine patch qd

## 2024-01-25 NOTE — PHYSICAL THERAPY INITIAL EVALUATION ADULT - PERTINENT HX OF CURRENT PROBLEM, REHAB EVAL
89 yo F with past medical history of urinary retention/overactive bladder, breast cancer (s/p R mastectomy ~15 years ago), depression/anxiety (on Lexapro), on an OTC  "memory pill" but does not remember the name, severe R  knee osteoarthritis receiving cortisone injection, osteoporosis with LBP, lives at home alone with but has HHA visit [facility] from 8am-noon, walks with a walker at home and uses a wheelchair outside, presenting for mechanical fall. Pt reports that this morning she could not get out of bed and had to wait for HHA to arrive to help her up but fell to her hands and knees during her attempt at moving to her walker. She called emergency services with her alert necklace and was brought to the ED. Of note, the pt reports having URI symptoms over the past weak with cough, sneezing, and rhinorrhea. Denies hitting her head, LOC, prodromal symptoms/dizziness. Denies F/C/N/V/D but admits to constipation and reports not stooling for 2 days which she attributes to poor PO intake iso URI. ROS positive for weakness, cough, wheezing, constipation.

## 2024-01-25 NOTE — OCCUPATIONAL THERAPY INITIAL EVALUATION ADULT - NSOTDISCHREC_GEN_A_CORE
if pt were to d/c home would require increased 1 person assistance and benefit from HOT/Sub-acute Rehab if pt were to d/c home would require 2 person assist for transfers and benefit from home OT/Sub-acute Rehab

## 2024-01-25 NOTE — PROGRESS NOTE ADULT - PROBLEM SELECTOR PLAN 6
#Macrocytosis:   .6.   - B12 and folate pending F: S/p 1L NS in ED  E: K>4, Mg >2  N: Regular diet  D: Lovenox  Gi: None  Dispo: RMF.

## 2024-01-25 NOTE — OCCUPATIONAL THERAPY INITIAL EVALUATION ADULT - ADDITIONAL COMMENTS
Pt reported she lives alone in an elevator apartment with no JEFFREY. She has a HHA 4 days/wk 8am-12pm to assist with some ADLs (bathing) and IADLs (cooking, cleaning, and shopping). Pt stated she was independent in short distance ambulation in her apartment using a rollator. She also has a commode, shower chair, and grab bars in bathroom.

## 2024-01-25 NOTE — PHYSICAL THERAPY INITIAL EVALUATION ADULT - GENERAL OBSERVATIONS, REHAB EVAL
Pt received semi-supine in bed, NAD, +hep-lock, +bed alarm, +call rosas. Cleared by ROSEMARIE Arellano to be seen. OT Marilyn present. Pt requires 2 person assist for transfers and side stepping EOB using RW, and 1 person assist for bed mobility. Pt left as received all lines intact needs met and within reach. Discussed with JUVENCIO Vazquez.

## 2024-01-25 NOTE — PROGRESS NOTE ADULT - PROBLEM SELECTOR PLAN 5
#Osteoporosis:   History of osteoporosis. Takes vitamin D and calcium at home.  - continue with vitamin d supplementation Pt reports memory changes over the past year with more forgetfulness. Believes she is in the early stages of dementia. Takes an OTC medication for memory but forgot its name.     - Ensure outpatient neurology follow-up.

## 2024-01-25 NOTE — PHYSICAL THERAPY INITIAL EVALUATION ADULT - NSPTDISCHREC_GEN_A_CORE
if pt were to d/c home would require increased 1 person assistance and benefit from HPT/Sub-acute Rehab

## 2024-01-25 NOTE — OCCUPATIONAL THERAPY INITIAL EVALUATION ADULT - DIAGNOSIS, OT EVAL
Pt admitted s/p fall and presents with generalized deconditioning, decreased balance, strength, and postural control impacting her overall independence with ADLs and functional mobility tasks.

## 2024-01-25 NOTE — PROGRESS NOTE ADULT - SUBJECTIVE AND OBJECTIVE BOX
"Fran Danielle, APRN  11/06/2023    [facility] [place] [facility] [place]  [facility]  [address] [place] [address]  Dept: [phone]  Dept Fax: [phone]  Loc: [phone]    " Medicine Progress Note  INCOMPLETE NOTE    INTERVAL EVENTS:   No acute events overnight.    SUBJECTIVE:   Patient seen and examined at bedside. Condition largely unchanged from yesterday. No acute complaints at this time.    ROS:  Negative unless otherwise stated above.    PHYSICAL EXAM:  General: Alert and oriented x 3. No acute distress. Well-nourished.  Eyes: EOMI. Anicteric.  HEENT: Moist mucous membranes. No scleral icterus. No cervical lymphadenopathy.  Lungs: Clear to auscultation bilaterally. No accessory muscle use.  Cardiovascular: Regular rate and rhythm. No murmur. No JVD.  Abdomen: Soft, non-tender and non-distended. No palpable masses.  Extremities: No edema. Non-tender.  Skin: No rashes or lesions. Warm.  Neurologic: No focal neurological deficits. CN II-XII grossly intact, but not individually tested.  Psychiatric: Cooperative. Appropriate mood and affect.    VITAL SIGNS:  Vital Signs Last 24 Hrs  T(C): 37.1 (2024 06:21), Max: 37.1 (2024 06:21)  T(F): 98.8 (2024 06:21), Max: 98.8 (2024 06:21)  HR: 94 (2024 06:21) (77 - 94)  BP: 153/75 (2024 06:21) (111/55 - 153/75)  BP(mean): --  RR: 18 (2024 06:21) (18 - 20)  SpO2: 93% (2024 06:21) (92% - 97%)    Parameters below as of 2024 06:21  Patient On (Oxygen Delivery Method): room air      <INS & OUTS:    INPATIENT MEDICATIONS:   MEDICATIONS  (STANDING):  enoxaparin Injectable 40 milliGRAM(s) SubCutaneous every 24 hours  influenza  Vaccine (HIGH DOSE) 0.7 milliLiter(s) IntraMuscular once  polyethylene glycol 3350 17 Gram(s) Oral once  senna 2 Tablet(s) Oral at bedtime    MEDICATIONS  (PRN):  acetaminophen     Tablet .. 650 milliGRAM(s) Oral every 6 hours PRN Temp greater or equal to 38C (100.4F), Mild Pain (1 - 3)  aluminum hydroxide/magnesium hydroxide/simethicone Suspension 30 milliLiter(s) Oral every 4 hours PRN Dyspepsia  benzonatate 100 milliGRAM(s) Oral every 8 hours PRN Cough  ketorolac   Injectable 15 milliGRAM(s) IV Push every 6 hours PRN Moderate Pain (4 - 6)  lidocaine   4% Patch 1 Patch Transdermal every 24 hours PRN Pain  melatonin 3 milliGRAM(s) Oral at bedtime PRN Insomnia  ondansetron Injectable 4 milliGRAM(s) IV Push every 8 hours PRN Nausea and/or Vomiting    HOME MEDICATIONS  acetaminophen 325 mg oral tablet: 2 tab(s) orally every 6 hours, As Needed for pain   cholecalciferol oral tablet: 400 unit(s) orally once a day  FLUoxetine 20 mg oral tablet:  orally 2 times a day    ALLERGIES:  Allergies    No Known Allergies    Intolerances    LABS:                       11.0   5.05  )-----------( 206      ( 2024 09:36 )             33.4         137  |  100  |  19  ----------------------------<  115<H>  4.0   |  28  |  0.70    Ca    9.0      2024 09:36    TPro  7.0  /  Alb  3.6  /  TBili  0.4  /  DBili  x   /  AST  25  /  ALT  15  /  AlkPhos  84        Urinalysis Basic - ( 2024 09:36 )    Color: Yellow / Appearance: Turbid / S.021 / pH: x  Gluc: 115 mg/dL / Ketone: 15 mg/dL  / Bili: Negative / Urobili: 1.0 mg/dL   Blood: x / Protein: 100 mg/dL / Nitrite: Negative   Leuk Esterase: Negative / RBC: 79 /HPF / WBC 3 /HPF   Sq Epi: x / Non Sq Epi: 7 /HPF / Bacteria: Many /HPF      CAPILLARY BLOOD GLUCOSE        RADIOLOGY & ADDITIONAL TESTS: Reviewed. Medicine Progress Note  INCOMPLETE NOTE    INTERVAL EVENTS:   No acute events overnight.     SUBJECTIVE:   Patient seen and examined at bedside. No acute complaints at this time. States she attempted to walk to the commode without assistance this morning, but could not transition from sitting to standing. No falls since admission.    ROS:  Negative unless otherwise stated above.    PHYSICAL EXAM:  General: Alert and oriented x 3. No acute distress. Well-nourished.  Eyes: EOMI. Anicteric.  HEENT: Moist mucous membranes. No scleral icterus. No cervical lymphadenopathy.  Lungs: Mod-severe wheezing all 4 quadrants. No accessory muscle use.  Cardiovascular: Regular rate, irregular rhythm ("skipped beats"). No murmur. No JVD.  Abdomen: Soft, non-tender and non-distended. No palpable masses.  Extremities: No edema. Non-tender.  Skin: No rashes or lesions. Warm.  Neurologic: No focal neurological deficits. CN II-XII grossly intact, but not individually tested.  Psychiatric: Cooperative. Appropriate mood and affect.      ROS:  Negative unless otherwise stated above.    PHYSICAL EXAM:  General: Alert and oriented x 3. No acute distress. Well-nourished.  Eyes: EOMI. Anicteric.  HEENT: Moist mucous membranes. No scleral icterus. No cervical lymphadenopathy.  Lungs: Clear to auscultation bilaterally. No accessory muscle use.  Cardiovascular: Regular rate and rhythm. No murmur. No JVD.  Abdomen: Soft, non-tender and non-distended. No palpable masses.  Extremities: No edema. Non-tender.  Skin: No rashes or lesions. Warm.  Neurologic: No focal neurological deficits. CN II-XII grossly intact, but not individually tested.  Psychiatric: Cooperative. Appropriate mood and affect.    VITAL SIGNS:  Vital Signs Last 24 Hrs  T(C): 37.1 (2024 06:21), Max: 37.1 (2024 06:21)  T(F): 98.8 (2024 06:21), Max: 98.8 (2024 06:21)  HR: 94 (2024 06:21) (77 - 94)  BP: 153/75 (2024 06:21) (111/55 - 153/75)  BP(mean): --  RR: 18 (2024 06:21) (18 - 20)  SpO2: 93% (2024 06:21) (92% - 97%)    Parameters below as of 2024 06:21  Patient On (Oxygen Delivery Method): room air      <INS & OUTS:    INPATIENT MEDICATIONS:   MEDICATIONS  (STANDING):  enoxaparin Injectable 40 milliGRAM(s) SubCutaneous every 24 hours  influenza  Vaccine (HIGH DOSE) 0.7 milliLiter(s) IntraMuscular once  polyethylene glycol 3350 17 Gram(s) Oral once  senna 2 Tablet(s) Oral at bedtime    MEDICATIONS  (PRN):  acetaminophen     Tablet .. 650 milliGRAM(s) Oral every 6 hours PRN Temp greater or equal to 38C (100.4F), Mild Pain (1 - 3)  aluminum hydroxide/magnesium hydroxide/simethicone Suspension 30 milliLiter(s) Oral every 4 hours PRN Dyspepsia  benzonatate 100 milliGRAM(s) Oral every 8 hours PRN Cough  ketorolac   Injectable 15 milliGRAM(s) IV Push every 6 hours PRN Moderate Pain (4 - 6)  lidocaine   4% Patch 1 Patch Transdermal every 24 hours PRN Pain  melatonin 3 milliGRAM(s) Oral at bedtime PRN Insomnia  ondansetron Injectable 4 milliGRAM(s) IV Push every 8 hours PRN Nausea and/or Vomiting    HOME MEDICATIONS  acetaminophen 325 mg oral tablet: 2 tab(s) orally every 6 hours, As Needed for pain   cholecalciferol oral tablet: 400 unit(s) orally once a day  FLUoxetine 20 mg oral tablet:  orally 2 times a day    ALLERGIES:  Allergies    No Known Allergies    Intolerances    LABS:                       11.0   5.05  )-----------( 206      ( 2024 09:36 )             33.4         137  |  100  |  19  ----------------------------<  115<H>  4.0   |  28  |  0.70    Ca    9.0      2024 09:36    TPro  7.0  /  Alb  3.6  /  TBili  0.4  /  DBili  x   /  AST  25  /  ALT  15  /  AlkPhos  84        Urinalysis Basic - ( 2024 09:36 )    Color: Yellow / Appearance: Turbid / S.021 / pH: x  Gluc: 115 mg/dL / Ketone: 15 mg/dL  / Bili: Negative / Urobili: 1.0 mg/dL   Blood: x / Protein: 100 mg/dL / Nitrite: Negative   Leuk Esterase: Negative / RBC: 79 /HPF / WBC 3 /HPF   Sq Epi: x / Non Sq Epi: 7 /HPF / Bacteria: Many /HPF      CAPILLARY BLOOD GLUCOSE        RADIOLOGY & ADDITIONAL TESTS: Reviewed. Medicine Progress Note    INTERVAL EVENTS:   No acute events overnight.     SUBJECTIVE:   Patient seen and examined at bedside. No acute complaints at this time. States she attempted to walk to the commode without assistance this morning, but could not transition from sitting to standing. No falls since admission.    ROS:  Negative unless otherwise stated above.    PHYSICAL EXAM:  General: Alert and oriented x 3. No acute distress. Well-nourished.  Eyes: EOMI. Anicteric.  HEENT: Moist mucous membranes. No scleral icterus. No cervical lymphadenopathy.  Lungs: Mod-severe wheezing all 4 quadrants. No accessory muscle use.  Cardiovascular: Regular rate, irregular rhythm ("skipped beats"). No murmur. No JVD.  Abdomen: Soft, non-tender and non-distended. No palpable masses.  Extremities: No edema. Non-tender.  Skin: No rashes or lesions. Warm.  Neurologic: Motor: 4/5 in LLE, 5/5 in RLE. Otherwise unremarkable.   Psychiatric: Cooperative. Appropriate mood and affect.      ROS:  Negative unless otherwise stated above.    PHYSICAL EXAM:  General: Alert and oriented x 3. No acute distress. Well-nourished.  Eyes: EOMI. Anicteric.  HEENT: Moist mucous membranes. No scleral icterus. No cervical lymphadenopathy.  Lungs: Clear to auscultation bilaterally. No accessory muscle use.  Cardiovascular: Regular rate and rhythm. No murmur. No JVD.  Abdomen: Soft, non-tender and non-distended. No palpable masses.  Extremities: No edema. Non-tender.  Skin: No rashes or lesions. Warm.  Neurologic: No focal neurological deficits. CN II-XII grossly intact, but not individually tested.  Psychiatric: Cooperative. Appropriate mood and affect.    VITAL SIGNS:  Vital Signs Last 24 Hrs  T(C): 37.1 (2024 06:21), Max: 37.1 (2024 06:21)  T(F): 98.8 (2024 06:21), Max: 98.8 (2024 06:21)  HR: 94 (2024 06:21) (77 - 94)  BP: 153/75 (2024 06:21) (111/55 - 153/75)  BP(mean): --  RR: 18 (2024 06:21) (18 - 20)  SpO2: 93% (2024 06:21) (92% - 97%)    Parameters below as of 2024 06:21  Patient On (Oxygen Delivery Method): room air      <INS & OUTS:    INPATIENT MEDICATIONS:   MEDICATIONS  (STANDING):  enoxaparin Injectable 40 milliGRAM(s) SubCutaneous every 24 hours  influenza  Vaccine (HIGH DOSE) 0.7 milliLiter(s) IntraMuscular once  polyethylene glycol 3350 17 Gram(s) Oral once  senna 2 Tablet(s) Oral at bedtime    MEDICATIONS  (PRN):  acetaminophen     Tablet .. 650 milliGRAM(s) Oral every 6 hours PRN Temp greater or equal to 38C (100.4F), Mild Pain (1 - 3)  aluminum hydroxide/magnesium hydroxide/simethicone Suspension 30 milliLiter(s) Oral every 4 hours PRN Dyspepsia  benzonatate 100 milliGRAM(s) Oral every 8 hours PRN Cough  ketorolac   Injectable 15 milliGRAM(s) IV Push every 6 hours PRN Moderate Pain (4 - 6)  lidocaine   4% Patch 1 Patch Transdermal every 24 hours PRN Pain  melatonin 3 milliGRAM(s) Oral at bedtime PRN Insomnia  ondansetron Injectable 4 milliGRAM(s) IV Push every 8 hours PRN Nausea and/or Vomiting    HOME MEDICATIONS  acetaminophen 325 mg oral tablet: 2 tab(s) orally every 6 hours, As Needed for pain   cholecalciferol oral tablet: 400 unit(s) orally once a day  FLUoxetine 20 mg oral tablet:  orally 2 times a day    ALLERGIES:  Allergies    No Known Allergies    Intolerances    LABS:                       11.0   5.05  )-----------( 206      ( 2024 09:36 )             33.4         137  |  100  |  19  ----------------------------<  115<H>  4.0   |  28  |  0.70    Ca    9.0      2024 09:36    TPro  7.0  /  Alb  3.6  /  TBili  0.4  /  DBili  x   /  AST  25  /  ALT  15  /  AlkPhos  84        Urinalysis Basic - ( 2024 09:36 )    Color: Yellow / Appearance: Turbid / S.021 / pH: x  Gluc: 115 mg/dL / Ketone: 15 mg/dL  / Bili: Negative / Urobili: 1.0 mg/dL   Blood: x / Protein: 100 mg/dL / Nitrite: Negative   Leuk Esterase: Negative / RBC: 79 /HPF / WBC 3 /HPF   Sq Epi: x / Non Sq Epi: 7 /HPF / Bacteria: Many /HPF      CAPILLARY BLOOD GLUCOSE        RADIOLOGY & ADDITIONAL TESTS: Reviewed.

## 2024-01-25 NOTE — OCCUPATIONAL THERAPY INITIAL EVALUATION ADULT - MODIFIED CLINICAL TEST OF SENSORY INTEGRATION IN BALANCE TEST
ambulating 4 side steps b/l with mod A x2 person using RW. Required verbal/non-verbal cues for postural control 2/2 significant retropulsion

## 2024-01-26 ENCOUNTER — TRANSCRIPTION ENCOUNTER (OUTPATIENT)
Age: 89
End: 2024-01-26

## 2024-01-26 DIAGNOSIS — Z71.89 OTHER SPECIFIED COUNSELING: ICD-10-CM

## 2024-01-26 DIAGNOSIS — I49.1 ATRIAL PREMATURE DEPOLARIZATION: ICD-10-CM

## 2024-01-26 LAB
ALBUMIN SERPL ELPH-MCNC: 3.2 G/DL — LOW (ref 3.3–5)
ALP SERPL-CCNC: 67 U/L — SIGNIFICANT CHANGE UP (ref 40–120)
ALT FLD-CCNC: 42 U/L — SIGNIFICANT CHANGE UP (ref 10–45)
ANION GAP SERPL CALC-SCNC: 10 MMOL/L — SIGNIFICANT CHANGE UP (ref 5–17)
AST SERPL-CCNC: 74 U/L — HIGH (ref 10–40)
BASOPHILS # BLD AUTO: 0.02 K/UL — SIGNIFICANT CHANGE UP (ref 0–0.2)
BASOPHILS NFR BLD AUTO: 0.5 % — SIGNIFICANT CHANGE UP (ref 0–2)
BILIRUB SERPL-MCNC: 0.2 MG/DL — SIGNIFICANT CHANGE UP (ref 0.2–1.2)
BUN SERPL-MCNC: 17 MG/DL — SIGNIFICANT CHANGE UP (ref 7–23)
CALCIUM SERPL-MCNC: 8.1 MG/DL — LOW (ref 8.4–10.5)
CHLORIDE SERPL-SCNC: 97 MMOL/L — SIGNIFICANT CHANGE UP (ref 96–108)
CO2 SERPL-SCNC: 25 MMOL/L — SIGNIFICANT CHANGE UP (ref 22–31)
CREAT SERPL-MCNC: 0.65 MG/DL — SIGNIFICANT CHANGE UP (ref 0.5–1.3)
EGFR: 82 ML/MIN/1.73M2 — SIGNIFICANT CHANGE UP
EOSINOPHIL # BLD AUTO: 0 K/UL — SIGNIFICANT CHANGE UP (ref 0–0.5)
EOSINOPHIL NFR BLD AUTO: 0 % — SIGNIFICANT CHANGE UP (ref 0–6)
GLUCOSE SERPL-MCNC: 102 MG/DL — HIGH (ref 70–99)
HCT VFR BLD CALC: 34.3 % — LOW (ref 34.5–45)
HGB BLD-MCNC: 11.5 G/DL — SIGNIFICANT CHANGE UP (ref 11.5–15.5)
IMM GRANULOCYTES NFR BLD AUTO: 0.5 % — SIGNIFICANT CHANGE UP (ref 0–0.9)
LYMPHOCYTES # BLD AUTO: 0.95 K/UL — LOW (ref 1–3.3)
LYMPHOCYTES # BLD AUTO: 23 % — SIGNIFICANT CHANGE UP (ref 13–44)
MAGNESIUM SERPL-MCNC: 2 MG/DL — SIGNIFICANT CHANGE UP (ref 1.6–2.6)
MCHC RBC-ENTMCNC: 33 PG — SIGNIFICANT CHANGE UP (ref 27–34)
MCHC RBC-ENTMCNC: 33.5 GM/DL — SIGNIFICANT CHANGE UP (ref 32–36)
MCV RBC AUTO: 98.3 FL — SIGNIFICANT CHANGE UP (ref 80–100)
MONOCYTES # BLD AUTO: 0.65 K/UL — SIGNIFICANT CHANGE UP (ref 0–0.9)
MONOCYTES NFR BLD AUTO: 15.7 % — HIGH (ref 2–14)
NEUTROPHILS # BLD AUTO: 2.49 K/UL — SIGNIFICANT CHANGE UP (ref 1.8–7.4)
NEUTROPHILS NFR BLD AUTO: 60.3 % — SIGNIFICANT CHANGE UP (ref 43–77)
NRBC # BLD: 0 /100 WBCS — SIGNIFICANT CHANGE UP (ref 0–0)
PHOSPHATE SERPL-MCNC: 2.7 MG/DL — SIGNIFICANT CHANGE UP (ref 2.5–4.5)
PLATELET # BLD AUTO: 159 K/UL — SIGNIFICANT CHANGE UP (ref 150–400)
POTASSIUM SERPL-MCNC: 3.2 MMOL/L — LOW (ref 3.5–5.3)
POTASSIUM SERPL-SCNC: 3.2 MMOL/L — LOW (ref 3.5–5.3)
PROT SERPL-MCNC: 6.1 G/DL — SIGNIFICANT CHANGE UP (ref 6–8.3)
RBC # BLD: 3.49 M/UL — LOW (ref 3.8–5.2)
RBC # FLD: 12.8 % — SIGNIFICANT CHANGE UP (ref 10.3–14.5)
SODIUM SERPL-SCNC: 132 MMOL/L — LOW (ref 135–145)
WBC # BLD: 4.13 K/UL — SIGNIFICANT CHANGE UP (ref 3.8–10.5)
WBC # FLD AUTO: 4.13 K/UL — SIGNIFICANT CHANGE UP (ref 3.8–10.5)

## 2024-01-26 PROCEDURE — 99232 SBSQ HOSP IP/OBS MODERATE 35: CPT | Mod: GC

## 2024-01-26 RX ORDER — POTASSIUM CHLORIDE 20 MEQ
40 PACKET (EA) ORAL
Refills: 0 | Status: COMPLETED | OUTPATIENT
Start: 2024-01-26 | End: 2024-01-26

## 2024-01-26 RX ADMIN — Medication 30 MILLIGRAM(S): at 12:01

## 2024-01-26 RX ADMIN — Medication 100 MILLIGRAM(S): at 22:17

## 2024-01-26 RX ADMIN — Medication 3 MILLILITER(S): at 18:25

## 2024-01-26 RX ADMIN — Medication 40 MILLIEQUIVALENT(S): at 12:01

## 2024-01-26 RX ADMIN — Medication 3 MILLILITER(S): at 12:01

## 2024-01-26 RX ADMIN — Medication 40 MILLIEQUIVALENT(S): at 09:02

## 2024-01-26 RX ADMIN — Medication 3 MILLILITER(S): at 06:04

## 2024-01-26 RX ADMIN — ENOXAPARIN SODIUM 40 MILLIGRAM(S): 100 INJECTION SUBCUTANEOUS at 15:53

## 2024-01-26 RX ADMIN — Medication 100 MILLIGRAM(S): at 09:13

## 2024-01-26 NOTE — DISCHARGE NOTE PROVIDER - CARE PROVIDER_API CALL
Yisel Sibley  Orthopaedic Surgery  [address]  [place] [address]  Phone: [phone]  Fax: [phone]  Follow Up Time: 1 month   Yisel Sibley  Orthopaedic Surgery  [address]  [place] [address]  Phone: [phone]  Fax: [phone]  Follow Up Time: 1 month    KIANA PRADO, Justa,    Phone: ()-  Fax: ()-  Follow Up Time: 2 weeks

## 2024-01-26 NOTE — DISCHARGE NOTE PROVIDER - NSDCCPCAREPLAN_GEN_ALL_CORE_FT
PRINCIPAL DISCHARGE DIAGNOSIS  Diagnosis: URI due to influenza  Assessment and Plan of Treatment: Patient presented with 2 days of cough, general malaise, found to be positive for Influenza AH3, w/ mod-severe exp. wheeze b/l on exam.  Admission CXR: perihilar opacities.  Wheezing has now improved.  Patient remains on room , hemodynamically stable.    - Continue Tamiflu 30mg q12 hrs for 8 doses next dose 10pm 1/27  - Patient can conitnue Duonebs q6hrs PRN for shortness of breath or wheezing for 5 more days.  This is not a home medication.

## 2024-01-26 NOTE — DIETITIAN INITIAL EVALUATION ADULT - ADD RECOMMEND
1. Continue with regular diet. Consider adding oral nutrition supplement if PO intake does not improve.  2. Encourage pt to meet nutritional needs as able   3. Monitor labs: electrolytes, cmp  4. Monitor weights   5. Pain and bowel regimen per team   6. Will continue to assess/honor food preferences as able  7. Monitor adherence to diet education

## 2024-01-26 NOTE — DIETITIAN INITIAL EVALUATION ADULT - OTHER CALCULATIONS
Based on Standards of Care pt within % IBW (114%) thus actual body weight used for all calculations. Needs adjusted for advanced age.

## 2024-01-26 NOTE — DISCHARGE NOTE PROVIDER - NSDCMRMEDTOKEN_GEN_ALL_CORE_FT
acetaminophen 325 mg oral tablet: 2 tab(s) orally every 6 hours, As Needed for pain   cholecalciferol oral tablet: 400 unit(s) orally once a day  FLUoxetine 20 mg oral tablet:  orally 2 times a day   acetaminophen 325 mg oral tablet: 2 tab(s) orally every 6 hours, As Needed for pain   benzonatate 100 mg oral capsule: 1 cap(s) orally every 8 hours As needed Cough  cholecalciferol oral tablet: 400 unit(s) orally once a day  FLUoxetine 20 mg oral tablet:  orally 2 times a day  ipratropium-albuterol 0.5 mg-2.5 mg/3 mL inhalation solution: 3 milliliter(s) inhaled every 6 hours  lidocaine 4% topical film: Apply topically to affected area once a day  melatonin 3 mg oral tablet: 1 tab(s) orally once a day (at bedtime) As needed Insomnia  oseltamivir 30 mg oral capsule: 1 cap(s) orally every 12 hours   acetaminophen 325 mg oral tablet: 2 tab(s) orally every 6 hours, As Needed for pain   benzonatate 100 mg oral capsule: 1 cap(s) orally every 8 hours As needed Cough  cholecalciferol oral tablet: 400 unit(s) orally once a day  escitalopram 5 mg oral tablet: 1 tab(s) orally once a day  ipratropium-albuterol 0.5 mg-2.5 mg/3 mL inhalation solution: 3 milliliter(s) inhaled every 6 hours  lidocaine 4% topical film: Apply topically to affected area once a day  melatonin 3 mg oral tablet: 1 tab(s) orally once a day (at bedtime) As needed Insomnia  oseltamivir 30 mg oral capsule: 1 cap(s) orally every 12 hours

## 2024-01-26 NOTE — DIETITIAN INITIAL EVALUATION ADULT - OTHER INFO
89 yo F with past medical history of urinary retention/overactive bladder, breast cancer (s/p R mastectomy ~15 years ago), depression/anxiety (on Lexapro), on an OTC  "memory pill" but does not remember the name, severe R  knee osteoarthritis receiving cortisone injection, osteoporosis with LBP, lives at home alone with but has HHA visit О[facility]ЬГ[facility] D[facility] from 8am-noon, walks with a walker at home and uses a wheelchair outside, presenting for mechanical fall. Pt reports that this morning she could not get out of bed and had to wait for HHA to arrive to help her up but fell to her hands and knees during her attempt at moving to her walker. She called emergency services with her alert necklace and was brought to the ED. Of note, the pt reports having URI symptoms over the past weak with cough, sneezing, and rhinorrhea.    Patient seen at bedside for nutrition assessment. Current diet order: regular. No known food allergies. No difficulty chewing/swallowing reported. Reports decreased appetite since being in the hospital but was eating well PTA. Patient consumed bread and fruit at breakfast. Food preferences: yogurt. Provided nutrition education discussing importance of adequate protein, food sources of protein. Dosing weight: 125 pounds, BMI 22.9, reports UBW of 125 pounds. No pressure injuries documented. No edema documented. Fecal incontinence, last BM 1/26. Hypokalemic - recommend to replete. Meds: bowel regimen. Observed with no overt signs and symptoms of muscle or fat wasting. Based on ASPEN guidelines, pt does not meet criteria for malnutrition. No cultural, ethnic, Evangelical food preferences reported. See nutrition recommendations below.

## 2024-01-26 NOTE — PROGRESS NOTE ADULT - PROBLEM SELECTOR PLAN 5
Pt with severe osteoarthritis of R knee s/p cortisone injections. Says she takes tylenol every day for the pain and is due to see her orthopedic surgeon for another cortisone injection this week.    - Pain mgmt as below:        - D/c ketorolac for moderate pain, given age       - C/w tylenol for mild pain        - C/w lidocaine patch qd Pt reports not passing BM in 2 days iso poor PO intake 2/2 recent URI    - Start dulcolax supp for ?impaction on admission imaging  - C/w senna  - C/w miralax

## 2024-01-26 NOTE — PROGRESS NOTE ADULT - PROBLEM SELECTOR PLAN 7
F: S/p 1L NS in ED  E: K>4, Mg >2  N: Regular diet  D: Lovenox  Gi: None  Dispo: RMF. Pt reports memory changes over the past year with more forgetfulness. Believes she is in the early stages of dementia. Takes an OTC medication for memory but forgot its name.     - Ensure outpatient neurology follow-up.

## 2024-01-26 NOTE — PROGRESS NOTE ADULT - PROBLEM SELECTOR PLAN 4
Pt reports not passing BM in 2 days iso poor PO intake 2/2 recent URI    - Start dulcolax supp for ?impaction on admission imaging  - C/w senna  - C/w miralax Mechanical ISO URI, w/ possible arrhythmogenic component | Pt presenting for fall after struggling to get out of bed, requiring help from HHA. Pt usually able to get out of bed to walker but today felt too weak to get up and fell to her hands and knees as her HHA was helping her up. Denies striking her head, LOC, prodromal sx, post-ictal state. Admits to having a URI over the past week with coughing, sneezing, rhinorrhea.   - 01/25: irregular rhythm, w/ "skipped beats" on exam. Pt denies palpitations.     - F/u EKG  - F/u PT/OT recs

## 2024-01-26 NOTE — PROGRESS NOTE ADULT - PROBLEM SELECTOR PLAN 3
Mechanical ISO URI, w/ possible arrhythmogenic component | Pt presenting for fall after struggling to get out of bed, requiring help from HHA. Pt usually able to get out of bed to walker but today felt too weak to get up and fell to her hands and knees as her HHA was helping her up. Denies striking her head, LOC, prodromal sx, post-ictal state. Admits to having a URI over the past week with coughing, sneezing, rhinorrhea.   - 01/25: irregular rhythm, w/ "skipped beats" on exam. Pt denies palpitations.     - F/u EKG  - F/u PT/OT recs Numerous PACs on admission EKG.

## 2024-01-26 NOTE — DISCHARGE NOTE PROVIDER - PROVIDER TOKENS
PROVIDER:[TOKEN:[[id number]],FOLLOWUP:[1 month]] PROVIDER:[TOKEN:[[id number]],FOLLOWUP:[1 month]],PROVIDER:[TOKEN:[[id number]],FOLLOWUP:[2 weeks]]

## 2024-01-26 NOTE — PROGRESS NOTE ADULT - ATTENDING COMMENTS
Clinically improved, pain controlled, no abdominal pain, no N/V, tolerating diet, constipated.  Labs reviewed    Continue on Oseltamivir, Bowel regimen, monitor response  DVT ppx   Pending DOT
Patient reports feeling overall ok, pain controlled, RVP positive for Flu.    Labs reviewed  Will start on Oseltamivir for Flu, continue symptomatic management.  Patient denies knee pain, agreeable to DOT  SW following  DVT ppx

## 2024-01-26 NOTE — PROGRESS NOTE ADULT - PROBLEM SELECTOR PLAN 1
P/w 2d of cough, general malaise. Found + Influenza AH3, w/ mod-severe exp. wheeze b/l on exam.  - Admission CXR: perihilar opacities  - CrCl 01/25 : ~44.0    - Start Tamiflu 75mg qd today, followed by 30mg BID for total 5d - renal dosing, per pharm  - Start Duonebs q6h  - Incentive spirometry

## 2024-01-26 NOTE — CONSULT NOTE ADULT - SUBJECTIVE AND OBJECTIVE BOX
Patient is a 89y old  Female who presents with a chief complaint of Fall (26 Jan 2024 07:54)      HPI:  89 yo F with past medical history of urinary retention/overactive bladder, breast cancer (s/p R mastectomy ~15 years ago), depression/anxiety (on Lexapro), on an OTC  "memory pill" but does not remember the name, severe R  knee osteoarthritis receiving cortisone injection, osteoporosis with LBP, lives at home alone with but has HHA visit [facility] from 8am-noon, walks with a walker at home and uses a wheelchair outside, presenting for mechanical fall. Pt reports that this morning she could not get out of bed and had to wait for HHA to arrive to help her up but fell to her hands and knees during her attempt at moving to her walker. She called emergency services with her alert necklace and was brought to the ED. Of note, the pt reports having URI symptoms over the past weak with cough, sneezing, and rhinorrhea. Denies hitting her head, LOC, prodromal symptoms/dizziness. Denies F/C/N/V/D but admits to constipation and reports not stooling for 2 days which she attributes to poor PO intake iso URI. ROS positive for weakness, cough, wheezing, constipation.     ED course:  Vitals  T 98.2, HR 86, /75, SpO2 93% RA, RR 18  Labs  WBC 5.05, Hgb 11, Plt 206, Na 137, K 4.0, Cl 100, CO2 28, BUN/Crt 19/0.70, Alk phos 84, AST/ALT 25/15, UA+ RBCs, casts   EKG NSR 89bpm anterior infarct undetermined age   Imaging  Xray hip w/ pelvis: No acute fracture or dislocation. Redemonstration bilateral pubic rami old fractures. Redemonstration bilateral hip joint space mild narrowing, lower lumbar spine degenerative changes. Rectal feces/impaction.  Xray knee: impression: No acute fracture or dislocation. Redemonstration bilateral pubic rami old fractures. Redemonstration bilateral hip joint space mild narrowing, lower lumbar spine degenerative changes. Rectal feces/impaction.  Interventions  Tylenol 650 x1, Ketorolac 15 IVP x1, 1L NS x1 (24 Jan 2024 14:11)    PAST MEDICAL & SURGICAL HISTORY:  Breast CA      Multiple falls      Back pain      Urinary retention      Osteoporosis      Macrocytosis        MEDICATIONS  (STANDING):  albuterol/ipratropium for Nebulization 3 milliLiter(s) Nebulizer every 6 hours  enoxaparin Injectable 40 milliGRAM(s) SubCutaneous every 24 hours  influenza  Vaccine (HIGH DOSE) 0.7 milliLiter(s) IntraMuscular once  oseltamivir 30 milliGRAM(s) Oral every 12 hours  potassium chloride   Powder 40 milliEquivalent(s) Oral every 2 hours    MEDICATIONS  (PRN):  acetaminophen     Tablet .. 650 milliGRAM(s) Oral every 6 hours PRN Temp greater or equal to 38C (100.4F), Mild Pain (1 - 3)  aluminum hydroxide/magnesium hydroxide/simethicone Suspension 30 milliLiter(s) Oral every 4 hours PRN Dyspepsia  benzonatate 100 milliGRAM(s) Oral every 8 hours PRN Cough  bisacodyl Suppository 10 milliGRAM(s) Rectal daily PRN Constipation  lidocaine   4% Patch 1 Patch Transdermal every 24 hours PRN Pain  melatonin 3 milliGRAM(s) Oral at bedtime PRN Insomnia  ondansetron Injectable 4 milliGRAM(s) IV Push every 8 hours PRN Nausea and/or Vomiting          Home Living Status :  lives alone in an elevator accessible apartment building          -  Prior Home Care Services :      4 hrs x 4 days/week     Baseline Functional Level Prior to Admission :             - ADL's/ IADL's :  independent           - Ambulatory status prior to admission :   walked with a rollator         FAMILY HISTORY:      CBC Full  -  ( 26 Jan 2024 07:19 )  WBC Count : 4.13 K/uL  RBC Count : 3.49 M/uL  Hemoglobin : 11.5 g/dL  Hematocrit : 34.3 %  Platelet Count - Automated : 159 K/uL  Mean Cell Volume : 98.3 fl  Mean Cell Hemoglobin : 33.0 pg  Mean Cell Hemoglobin Concentration : 33.5 gm/dL  Auto Neutrophil # : 2.49 K/uL  Auto Lymphocyte # : 0.95 K/uL  Auto Monocyte # : 0.65 K/uL  Auto Eosinophil # : 0.00 K/uL  Auto Basophil # : 0.02 K/uL  Auto Neutrophil % : 60.3 %  Auto Lymphocyte % : 23.0 %  Auto Monocyte % : 15.7 %  Auto Eosinophil % : 0.0 %  Auto Basophil % : 0.5 %      01-26    132<L>  |  97  |  17  ----------------------------<  102<H>  3.2<L>   |  25  |  0.65    Ca    8.1<L>      26 Jan 2024 07:19  Phos  2.7     01-26  Mg     2.0     01-26    TPro  6.1  /  Alb  3.2<L>  /  TBili  0.2  /  DBili  x   /  AST  74<H>  /  ALT  42  /  AlkPhos  67  01-26      Urinalysis Basic - ( 26 Jan 2024 07:19 )    Color: x / Appearance: x / SG: x / pH: x  Gluc: 102 mg/dL / Ketone: x  / Bili: x / Urobili: x   Blood: x / Protein: x / Nitrite: x   Leuk Esterase: x / RBC: x / WBC x   Sq Epi: x / Non Sq Epi: x / Bacteria: x        Radiology :     < from: Xray Chest 1 View- PORTABLE-Routine (Xray Chest 1 View- PORTABLE-Routine .) (01.24.24 @ 16:59) >  ACC: [id number] EXAM:  XR CHEST PORTABLE ROUTINE 1V   ORDERED BY: LELE CHANDLER     PROCEDURE DATE:  01/24/2024          INTERPRETATION:  XR CHEST dated 1/24/2024 4:59 PM    CLINICAL INFORMATION: Female, 89 years old.  R/o pna.    PRIOR STUDIES: 4/19/2022    FINDINGS/  IMPRESSION: Heart size, mediastinal and hilar contours are unchanged and   within normal limits. No consolidation, pneumothorax or pleural   effusions. No acute soft tissue or osseous pathology.    < from: CT Cervical Spine No Cont (04.19.22 @ 20:53) >    ACC: [id number] EXAM:  CT CERVICAL SPINE                          PROCEDURE DATE:  04/19/2022          INTERPRETATION:  CERVICAL SPINE CT WITHOUT CONTRAST dated 4/19/2022 8:53   PM    CLINICAL STATEMENT: Fall.    TECHNIQUE: Contiguous noncontrast transaxial CT images were obtained   through the cervical spine. Reconstructions were then created in the   axial, sagittal, and coronal planes.    COMPARISON: CT cervical spine dated 12/27/2021.    FINDINGS:    Levocurvature of the cervical spine. No spondylolisthesis. No acute   fracture is identified. The vertebral body heights are grossly   maintained. Multilevel disc space narrowing throughout the cervical   spine. No prevertebral soft tissue swelling is demonstrated.    No large disc herniations or spinal canal stenosis. Uncovertebral   hypertrophy contributing to multilevel neural foraminal narrowing, most   prominent at the levels of C3-C4 bilaterally and C5-C6 on the right.    IMPRESSION:    1.  No acute fracture or malalignment.  2.  Moderate cervical spondylosis, most prominent at the level of C3-C4.      < from: CT Lumbar Spine No Cont (04.19.22 @ 20:53) >  ACC: [id number] EXAM:  CT LUMBAR SPINE                          PROCEDURE DATE:  04/19/2022          INTERPRETATION:  PROCEDURE: CT Lumbar spine without contrast    INDICATION: Fall.    TECHNIQUE:  Multiple axial sections were obtained from the thoracolumbar   junction through the sacrum. Sagittal and coronal reformats were obtained   from the axial data set. The images were reviewed in soft tissue and bone   windows.    COMPARISON: CT lumbar spine dated 12/27/2021.    FINDINGS: There are five non-rib-bearing lumbar vertebrae.   Dextrocurvature of the lumbar spine. No spondylolisthesis. No interval   change in severe chronic compression fractures of the T12 and L1   vertebral bodies with bony retropulsion contributing to moderate spinal   canal stenosis at the level of T11-T12 and mild spinal canal stenosis at   the level of T12-L1. No interval change in additional chronic compression   fractures of the L3 and L4 vertebral bodies. There is no new fracture of   the visualized osseous structures. Generalized osteopenia.      At the L1/2 level, there is no spinal canal stenosis. Posterior osseous   ridging contributing to mild left neural foraminal narrowing. No right   neural foraminal narrowing     At the L2/3 level, there is no spinalcanal stenosis. Posterior osseous   ridging contributing to mild left neural foraminal narrowing. No right   neural foraminal narrowing.    At the L3/4 level, there is no spinal canal stenosis or neural foraminal   narrowing.    At the L4/5 level, there is a disc bulge and ligamentum flavum   hypertrophy contributing to mild spinal canal stenosis and moderate   neural foraminal narrowing bilaterally.    At the L5/S1 level, there is a disc bulge without spinal canal stenosis   or neural foraminal narrowing.    IMPRESSION: No acute fracture of the visualized osseous structures. Since   12/27/2021, there has been no significant interval change.    < end of copied text >          Review of Systems : per HPI         Vital Signs Last 24 Hrs  T(C): 37.7 (26 Jan 2024 05:15), Max: 37.7 (26 Jan 2024 05:15)  T(F): 99.9 (26 Jan 2024 05:15), Max: 99.9 (26 Jan 2024 05:15)  HR: 88 (26 Jan 2024 05:15) (85 - 100)  BP: 148/65 (26 Jan 2024 05:15) (112/63 - 148/65)  BP(mean): 80 (25 Jan 2024 16:50) (80 - 87)  RR: 18 (26 Jan 2024 05:15) (18 - 18)  SpO2: 93% (26 Jan 2024 05:15) (91% - 93%)    Parameters below as of 26 Jan 2024 05:15  Patient On (Oxygen Delivery Method): room air            Physical Exam :  on  DROPLET/ INFL A isolation , 89 y o woman lying comfortably in semi Martin's position , awake , alert , no acute complaints     Head : normocephalic , atraumatic    Eyes : PERRLA , EOMI , no nystagmus , sclera anicteric    ENT / FACE : neg nasal discharge , uvula midline , no oropharyngeal erythema / exudate    Neck : supple , negative JVD , negative carotid bruits , no thyromegaly    Chest :  mild end exp wheezes    Cardiovascular : regular rate and rhythm , neg murmurs / rubs / gallops    Abdomen : soft , non distended , non tender to palpation in all 4 quadrants ,  normal bowel sounds     Extremities : WWP , neg cyanosis /clubbing / edema     Neurologic Exam :     Alert and oriented  x 3     Cranial Nerves:           II :                         pupils equal , round and reactive to light , visual fields intact         III/ IV/VI :              extraocular movements intact , neg nystagmus , neg ptosis        V :                        facial sensation intact , V1-3 normal        VII :                      face symmetric , no droop , normal eye closure and smile        VIII :                     hearing intact to finger rub bilaterally        IX and X :             no hoarseness , gag intact , palate/ uvula rise symmetrically        XI :                       SCM / trapezius strength intact bilateral        XII :                      no tongue deviation       Motor Exam:                Right UE:                     no focal weakness ,  > 3+/5 throughout  , no pronator drift     Left UE:                       no focal weakness ,  > 3+/5 throughout  , no pronator drift         Right LE:          no focal weakness ,  > 3+/5 throughout        Left LE:          no focal weakness ,  > 3+/5 throughout         Sensation :         intact to light touch x 4 extremities                            no neglect or extinction on double simultaneous testing    DTR :           biceps/brachioradialis : equal                            patella/ankle : equal           neg Babinski         Coordination :            Finger to Nose :  neg dysmetria bilaterally        Gait :  not tested          PM&R Impression : admitted for c/o back pain s/p apparent mechanical fall at home    - no acute pathology on CT brain imaging     - deconditioned         Recommendations / Plan :       1) Physical / Occupational therapy focusing on therapeutic exercises , equipment evaluation , bed mobility/transfer out of bed evaluation , progressive ambulation with assistive devices prn .    2) Current disposition plan recommendation  :    DOT placement

## 2024-01-26 NOTE — DISCHARGE NOTE PROVIDER - CARE PROVIDERS DIRECT ADDRESSES
[email or web address]

## 2024-01-26 NOTE — PROGRESS NOTE ADULT - PROBLEM SELECTOR PLAN 6
Pt reports memory changes over the past year with more forgetfulness. Believes she is in the early stages of dementia. Takes an OTC medication for memory but forgot its name.     - Ensure outpatient neurology follow-up. Pt with severe osteoarthritis of R knee s/p cortisone injections. Says she takes tylenol every day for the pain and is due to see her orthopedic surgeon for another cortisone injection this week.    - Pain mgmt as below:        - D/c ketorolac for moderate pain, given age       - C/w tylenol for mild pain        - C/w lidocaine patch qd

## 2024-01-26 NOTE — PROGRESS NOTE ADULT - SUBJECTIVE AND OBJECTIVE BOX
Medicine Progress Note  INCOMPLETE NOTE    INTERVAL EVENTS:   No acute events overnight.    SUBJECTIVE:   Patient seen and examined at bedside. Condition largely unchanged from yesterday. No acute complaints at this time.    ROS:  Negative unless otherwise stated above.    PHYSICAL EXAM:  General: Alert and oriented x 3. No acute distress. Well-nourished.  Eyes: EOMI. Anicteric.  HEENT: Moist mucous membranes. No scleral icterus. No cervical lymphadenopathy.  Lungs: Clear to auscultation bilaterally. No accessory muscle use.  Cardiovascular: Regular rate and rhythm. No murmur. No JVD.  Abdomen: Soft, non-tender and non-distended. No palpable masses.  Extremities: No edema. Non-tender.  Skin: No rashes or lesions. Warm.  Neurologic: No focal neurological deficits. CN II-XII grossly intact, but not individually tested.  Psychiatric: Cooperative. Appropriate mood and affect.    VITAL SIGNS:  Vital Signs Last 24 Hrs  T(C): 37.7 (26 Jan 2024 05:15), Max: 37.7 (26 Jan 2024 05:15)  T(F): 99.9 (26 Jan 2024 05:15), Max: 99.9 (26 Jan 2024 05:15)  HR: 88 (26 Jan 2024 05:15) (85 - 100)  BP: 148/65 (26 Jan 2024 05:15) (112/63 - 148/65)  BP(mean): 80 (25 Jan 2024 16:50) (80 - 87)  RR: 18 (26 Jan 2024 05:15) (18 - 18)  SpO2: 93% (26 Jan 2024 05:15) (91% - 93%)    Parameters below as of 26 Jan 2024 05:15  Patient On (Oxygen Delivery Method): room air      <INS & OUTS:    01-25-24 @ 07:01  -  01-26-24 @ 07:00  --------------------------------------------------------  IN: 0 mL / OUT: 400 mL / NET: -400 mL      INPATIENT MEDICATIONS:   MEDICATIONS  (STANDING):  albuterol/ipratropium for Nebulization 3 milliLiter(s) Nebulizer every 6 hours  enoxaparin Injectable 40 milliGRAM(s) SubCutaneous every 24 hours  influenza  Vaccine (HIGH DOSE) 0.7 milliLiter(s) IntraMuscular once  oseltamivir 30 milliGRAM(s) Oral every 12 hours    MEDICATIONS  (PRN):  acetaminophen     Tablet .. 650 milliGRAM(s) Oral every 6 hours PRN Temp greater or equal to 38C (100.4F), Mild Pain (1 - 3)  aluminum hydroxide/magnesium hydroxide/simethicone Suspension 30 milliLiter(s) Oral every 4 hours PRN Dyspepsia  benzonatate 100 milliGRAM(s) Oral every 8 hours PRN Cough  bisacodyl Suppository 10 milliGRAM(s) Rectal daily PRN Constipation  lidocaine   4% Patch 1 Patch Transdermal every 24 hours PRN Pain  melatonin 3 milliGRAM(s) Oral at bedtime PRN Insomnia  ondansetron Injectable 4 milliGRAM(s) IV Push every 8 hours PRN Nausea and/or Vomiting    HOME MEDICATIONS  acetaminophen 325 mg oral tablet: 2 tab(s) orally every 6 hours, As Needed for pain   cholecalciferol oral tablet: 400 unit(s) orally once a day  FLUoxetine 20 mg oral tablet:  orally 2 times a day    ALLERGIES:  Allergies    No Known Allergies    Intolerances    LABS:                       11.5   4.13  )-----------( 159      ( 26 Jan 2024 07:19 )             34.3     01-26    x   |  x   |  17  ----------------------------<  102<H>  x    |  25  |  0.65    Ca    8.0<L>      25 Jan 2024 10:43  Phos  2.7     01-26  Mg     2.0     01-26    TPro  6.8  /  Alb  3.4  /  TBili  0.3  /  DBili  x   /  AST  61<H>  /  ALT  33  /  AlkPhos  75  01-25      Urinalysis Basic - ( 26 Jan 2024 07:19 )    Color: x / Appearance: x / SG: x / pH: x  Gluc: 102 mg/dL / Ketone: x  / Bili: x / Urobili: x   Blood: x / Protein: x / Nitrite: x   Leuk Esterase: x / RBC: x / WBC x   Sq Epi: x / Non Sq Epi: x / Bacteria: x      CAPILLARY BLOOD GLUCOSE        RADIOLOGY & ADDITIONAL TESTS: Reviewed. Medicine Progress Note      INTERVAL EVENTS:   No acute events overnight.    SUBJECTIVE:   Patient seen and examined at bedside. Condition largely unchanged from yesterday. No acute complaints at this time.    ROS:  Negative unless otherwise stated above.    PHYSICAL EXAM:  General: Alert and oriented x 3. No acute distress. Well-nourished.  Eyes: EOMI. Anicteric.  HEENT: Moist mucous membranes. No scleral icterus. No cervical lymphadenopathy.  Lungs: Mod-severe wheezing all 4 quadrants. No accessory muscle use.  Cardiovascular: Regular rate, irregular rhythm ("skipped beats"). No murmur. No JVD.  Abdomen: Soft, non-tender and non-distended. No palpable masses.  Extremities: No edema. Non-tender.  Skin: No rashes or lesions. Warm.  Neurologic: Motor: 4/5 in LLE, 5/5 in RLE. Otherwise unremarkable.   Psychiatric: Cooperative. Appropriate mood and affect.    VITAL SIGNS:  Vital Signs Last 24 Hrs  T(C): 37.7 (26 Jan 2024 05:15), Max: 37.7 (26 Jan 2024 05:15)  T(F): 99.9 (26 Jan 2024 05:15), Max: 99.9 (26 Jan 2024 05:15)  HR: 88 (26 Jan 2024 05:15) (85 - 100)  BP: 148/65 (26 Jan 2024 05:15) (112/63 - 148/65)  BP(mean): 80 (25 Jan 2024 16:50) (80 - 87)  RR: 18 (26 Jan 2024 05:15) (18 - 18)  SpO2: 93% (26 Jan 2024 05:15) (91% - 93%)    Parameters below as of 26 Jan 2024 05:15  Patient On (Oxygen Delivery Method): room air      <INS & OUTS:    01-25-24 @ 07:01  -  01-26-24 @ 07:00  --------------------------------------------------------  IN: 0 mL / OUT: 400 mL / NET: -400 mL      INPATIENT MEDICATIONS:   MEDICATIONS  (STANDING):  albuterol/ipratropium for Nebulization 3 milliLiter(s) Nebulizer every 6 hours  enoxaparin Injectable 40 milliGRAM(s) SubCutaneous every 24 hours  influenza  Vaccine (HIGH DOSE) 0.7 milliLiter(s) IntraMuscular once  oseltamivir 30 milliGRAM(s) Oral every 12 hours    MEDICATIONS  (PRN):  acetaminophen     Tablet .. 650 milliGRAM(s) Oral every 6 hours PRN Temp greater or equal to 38C (100.4F), Mild Pain (1 - 3)  aluminum hydroxide/magnesium hydroxide/simethicone Suspension 30 milliLiter(s) Oral every 4 hours PRN Dyspepsia  benzonatate 100 milliGRAM(s) Oral every 8 hours PRN Cough  bisacodyl Suppository 10 milliGRAM(s) Rectal daily PRN Constipation  lidocaine   4% Patch 1 Patch Transdermal every 24 hours PRN Pain  melatonin 3 milliGRAM(s) Oral at bedtime PRN Insomnia  ondansetron Injectable 4 milliGRAM(s) IV Push every 8 hours PRN Nausea and/or Vomiting    HOME MEDICATIONS  acetaminophen 325 mg oral tablet: 2 tab(s) orally every 6 hours, As Needed for pain   cholecalciferol oral tablet: 400 unit(s) orally once a day  FLUoxetine 20 mg oral tablet:  orally 2 times a day    ALLERGIES:  Allergies    No Known Allergies    Intolerances    LABS:                       11.5   4.13  )-----------( 159      ( 26 Jan 2024 07:19 )             34.3     01-26    x   |  x   |  17  ----------------------------<  102<H>  x    |  25  |  0.65    Ca    8.0<L>      25 Jan 2024 10:43  Phos  2.7     01-26  Mg     2.0     01-26    TPro  6.8  /  Alb  3.4  /  TBili  0.3  /  DBili  x   /  AST  61<H>  /  ALT  33  /  AlkPhos  75  01-25      Urinalysis Basic - ( 26 Jan 2024 07:19 )    Color: x / Appearance: x / SG: x / pH: x  Gluc: 102 mg/dL / Ketone: x  / Bili: x / Urobili: x   Blood: x / Protein: x / Nitrite: x   Leuk Esterase: x / RBC: x / WBC x   Sq Epi: x / Non Sq Epi: x / Bacteria: x      CAPILLARY BLOOD GLUCOSE        RADIOLOGY & ADDITIONAL TESTS: Reviewed. Medicine Progress Note    INTERVAL EVENTS:   No acute events overnight.    SUBJECTIVE:   Patient seen and examined at bedside. Condition largely unchanged from yesterday. No acute complaints at this time.    ROS:  Negative unless otherwise stated above.    PHYSICAL EXAM:  General: Alert and oriented x 3. No acute distress. Well-nourished.  Eyes: EOMI. Anicteric.  HEENT: Moist mucous membranes. No scleral icterus. No cervical lymphadenopathy.  Lungs: Mod-severe wheezing all 4 quadrants. No accessory muscle use.  Cardiovascular: Regular rate, irregular rhythm ("skipped beats"). No murmur. No JVD.  Abdomen: Soft, non-tender and non-distended. No palpable masses.  Extremities: No edema. Non-tender.  Skin: No rashes or lesions. Warm.  Neurologic: Motor: 4/5 in LLE, 5/5 in RLE. Otherwise unremarkable.   Psychiatric: Cooperative. Appropriate mood and affect.    VITAL SIGNS:  Vital Signs Last 24 Hrs  T(C): 37.7 (26 Jan 2024 05:15), Max: 37.7 (26 Jan 2024 05:15)  T(F): 99.9 (26 Jan 2024 05:15), Max: 99.9 (26 Jan 2024 05:15)  HR: 88 (26 Jan 2024 05:15) (85 - 100)  BP: 148/65 (26 Jan 2024 05:15) (112/63 - 148/65)  BP(mean): 80 (25 Jan 2024 16:50) (80 - 87)  RR: 18 (26 Jan 2024 05:15) (18 - 18)  SpO2: 93% (26 Jan 2024 05:15) (91% - 93%)    Parameters below as of 26 Jan 2024 05:15  Patient On (Oxygen Delivery Method): room air      <INS & OUTS:    01-25-24 @ 07:01  -  01-26-24 @ 07:00  --------------------------------------------------------  IN: 0 mL / OUT: 400 mL / NET: -400 mL      INPATIENT MEDICATIONS:   MEDICATIONS  (STANDING):  albuterol/ipratropium for Nebulization 3 milliLiter(s) Nebulizer every 6 hours  enoxaparin Injectable 40 milliGRAM(s) SubCutaneous every 24 hours  influenza  Vaccine (HIGH DOSE) 0.7 milliLiter(s) IntraMuscular once  oseltamivir 30 milliGRAM(s) Oral every 12 hours    MEDICATIONS  (PRN):  acetaminophen     Tablet .. 650 milliGRAM(s) Oral every 6 hours PRN Temp greater or equal to 38C (100.4F), Mild Pain (1 - 3)  aluminum hydroxide/magnesium hydroxide/simethicone Suspension 30 milliLiter(s) Oral every 4 hours PRN Dyspepsia  benzonatate 100 milliGRAM(s) Oral every 8 hours PRN Cough  bisacodyl Suppository 10 milliGRAM(s) Rectal daily PRN Constipation  lidocaine   4% Patch 1 Patch Transdermal every 24 hours PRN Pain  melatonin 3 milliGRAM(s) Oral at bedtime PRN Insomnia  ondansetron Injectable 4 milliGRAM(s) IV Push every 8 hours PRN Nausea and/or Vomiting    HOME MEDICATIONS  acetaminophen 325 mg oral tablet: 2 tab(s) orally every 6 hours, As Needed for pain   cholecalciferol oral tablet: 400 unit(s) orally once a day  FLUoxetine 20 mg oral tablet:  orally 2 times a day    ALLERGIES:  Allergies    No Known Allergies    Intolerances    LABS:                       11.5   4.13  )-----------( 159      ( 26 Jan 2024 07:19 )             34.3     01-26    x   |  x   |  17  ----------------------------<  102<H>  x    |  25  |  0.65    Ca    8.0<L>      25 Jan 2024 10:43  Phos  2.7     01-26  Mg     2.0     01-26    TPro  6.8  /  Alb  3.4  /  TBili  0.3  /  DBili  x   /  AST  61<H>  /  ALT  33  /  AlkPhos  75  01-25      Urinalysis Basic - ( 26 Jan 2024 07:19 )    Color: x / Appearance: x / SG: x / pH: x  Gluc: 102 mg/dL / Ketone: x  / Bili: x / Urobili: x   Blood: x / Protein: x / Nitrite: x   Leuk Esterase: x / RBC: x / WBC x   Sq Epi: x / Non Sq Epi: x / Bacteria: x      CAPILLARY BLOOD GLUCOSE        RADIOLOGY & ADDITIONAL TESTS: Reviewed.

## 2024-01-26 NOTE — PROGRESS NOTE ADULT - ASSESSMENT
Assessment:   89F with PMH of urinary retention/overactive bladder, breast cancer (s/p right mastectomy), hard of hearing, and osteoporosis, BIBEMS for back pain s/p mechanical fall at home. 
Assessment:   89F with PMH of urinary retention/overactive bladder, breast cancer (s/p right mastectomy), hard of hearing, and osteoporosis, BIBEMS for back pain s/p mechanical fall at home. Found with PACs on admission EKG

## 2024-01-26 NOTE — DISCHARGE NOTE PROVIDER - HOSPITAL COURSE
89F with PMH of urinary retention/overactive bladder, breast cancer (s/p right mastectomy), hard of hearing, and osteoporosis, BIBEMS for back pain s/p mechanical fall at home. Found with PACs on admission EKG    # URI due to influenza.   Patient presented with 2 days of cough, general malaise. Found + Influenza AH3, w/ mod-severe exp. wheeze b/l on exam.  Admission CXR: perihilar opacities.  Wheezing has now improved.  Patient remains on room , hemodynamically stable.    - Continue Tamiflu 30mg q12 hrs for 6 doses next dose 10pm 1/27  - Patient can conitnue Duonebs q6hrs PRN for shortness of breath or wheezing for 5 more days    # PAC (premature atrial contraction).  Patient with numerous PACs on admission EKG, remains asymptomatic.    - Followup with PCP and cardiologist     # Fall.  Patient with likely mechanical fall in setting of weakness due to influenza infection.   No history of head strike, loss of consciousness, prodromal sx, post-ictal state. Admits to having a URI over the past week with coughing, sneezing, rhinorrhea.   - Treat flu as above    # Constipation.  Patient with constipation, had BM on 1/26.    - Continue Miralax and Senna     # Osteoarthritis.  Patient with history of severe osteoarthritis of R knee s/p cortisone injections. Says she takes tylenol every day for the pain and is due to see her orthopedic surgeon for another cortisone injection this week.  - Continue ketorolac for moderate pain and Tylenol for mild pain   - Continue lidocaine patch qd.    # Memory change.  Pt reports memory changes over the past year with more forgetfulness. Believes she is in the early stages of dementia. Takes an OTC medication for memory but forgot its name.   - Followup with PCP and neurology    Patient was discharged to: [facility]  New medications: Tamiflu  Changes to old medications: none  Medications that were stopped: none  Items to Follow up as outpatient: none   89F with PMH of urinary retention/overactive bladder, breast cancer (s/p right mastectomy), hard of hearing, and osteoporosis, BIBEMS for back pain s/p mechanical fall at home. Found with PACs on admission EKG    # URI due to influenza.   Patient presented with 2 days of cough, general malaise. Found + Influenza AH3, w/ mod-severe exp. wheeze b/l on exam.  Admission CXR: perihilar opacities.  Wheezing has now improved.  Patient remains on room , hemodynamically stable.    - Continue Tamiflu 30mg q12 hrs for 6 doses next dose 10pm 1/27  - Patient can conitnue Duonebs q6hrs PRN for shortness of breath or wheezing for 5 more days    # PAC (premature atrial contraction).  Patient with numerous PACs on admission EKG, remains asymptomatic.    - Followup with PCP Dr. Sharita Anne    # Fall.  Patient with likely mechanical fall in setting of weakness due to influenza infection.   No history of head strike, loss of consciousness, prodromal sx, post-ictal state. Admits to having a URI over the past week with coughing, sneezing, rhinorrhea.   - Treat flu as above    # Constipation.  Patient with constipation, had BM on 1/26.    - Continue Miralax and Senna     # Osteoarthritis.  Patient with history of severe osteoarthritis of R knee s/p cortisone injections. Says she takes tylenol every day for the pain and is due to see her orthopedic surgeon for another cortisone injection this week.  - Continue Tylenol PRN and lidocaine patch qd.    # Memory change.  Pt reports memory changes over the past year with more forgetfulness. Believes she is in the early stages of dementia. Takes an OTC medication for memory but forgot its name.   - Followup with PCP Dr. Sharita Anne    Patient was discharged to: [facility]  New medications: Tamiflu  Changes to old medications: none  Medications that were stopped: none  Items to Follow up as outpatient: none   89F with PMH of urinary retention/overactive bladder, breast cancer (s/p right mastectomy), hard of hearing, and osteoporosis, BIBEMS for back pain s/p mechanical fall at home. Found with PACs on admission EKG    # URI due to influenza.   Patient presented with 2 days of cough, general malaise. Found + Influenza AH3, w/ mod-severe exp. wheeze b/l on exam.  Admission CXR: perihilar opacities.  Wheezing has now improved.  Patient remains on room , hemodynamically stable.    - Continue Tamiflu 30mg q12 hrs for 6 doses next dose 10pm 1/27  - Patient can conitnue Duonebs q6hrs PRN for shortness of breath or wheezing for 5 more days    # PAC (premature atrial contraction).  Patient with numerous PACs on admission EKG, remains asymptomatic.    - Followup with PCP Dr. Sharita Anne    # Fall.  Patient with likely mechanical fall in setting of weakness due to influenza infection.   No history of head strike, loss of consciousness, prodromal sx, post-ictal state. Admits to having a URI over the past week with coughing, sneezing, rhinorrhea.   - Treat flu as above    # Constipation.  Patient with constipation, had BM on 1/26.    - Continue Miralax and Senna     # Osteoarthritis.  Patient with history of severe osteoarthritis of R knee s/p cortisone injections. Says she takes tylenol every day for the pain and is due to see her orthopedic surgeon for another cortisone injection this week.  - Continue Tylenol PRN and lidocaine patch qd.    # Memory change.  Pt reports memory changes over the past year with more forgetfulness. Believes she is in the early stages of dementia. Takes an OTC medication for memory but forgot its name.   - Followup with PCP Dr. Sharita Anne    Patient was discharged to: [facility]  New medications: Tamiflu  Changes to old medications: none  Medications that were stopped: none  Items to Follow up as outpatient: none

## 2024-01-26 NOTE — DIETITIAN INITIAL EVALUATION ADULT - PROBLEM SELECTOR PLAN 5
F: S/p 1L NS  E: K>4, Mg >2  N: Regular diet  D: Lovenox  Gi: None  Dispo: [facility]

## 2024-01-26 NOTE — DIETITIAN INITIAL EVALUATION ADULT - PERTINENT MEDS FT
MEDICATIONS  (STANDING):  albuterol/ipratropium for Nebulization 3 milliLiter(s) Nebulizer every 6 hours  enoxaparin Injectable 40 milliGRAM(s) SubCutaneous every 24 hours  influenza  Vaccine (HIGH DOSE) 0.7 milliLiter(s) IntraMuscular once  oseltamivir 30 milliGRAM(s) Oral every 12 hours    MEDICATIONS  (PRN):  acetaminophen     Tablet .. 650 milliGRAM(s) Oral every 6 hours PRN Temp greater or equal to 38C (100.4F), Mild Pain (1 - 3)  aluminum hydroxide/magnesium hydroxide/simethicone Suspension 30 milliLiter(s) Oral every 4 hours PRN Dyspepsia  benzonatate 100 milliGRAM(s) Oral every 8 hours PRN Cough  bisacodyl Suppository 10 milliGRAM(s) Rectal daily PRN Constipation  bisacodyl Suppository 10 milliGRAM(s) Rectal once PRN Constipation  lidocaine   4% Patch 1 Patch Transdermal every 24 hours PRN Pain  melatonin 3 milliGRAM(s) Oral at bedtime PRN Insomnia  ondansetron Injectable 4 milliGRAM(s) IV Push every 8 hours PRN Nausea and/or Vomiting

## 2024-01-26 NOTE — DISCHARGE NOTE PROVIDER - ATTENDING DISCHARGE PHYSICAL EXAMINATION:
Patient presenting with fall found with Influenza A, clinically improved, being discharged to [facility]    General: AOX3, NAD, lying flat in bed, speaking in full sentences, no labored breathing on RA  HEENT: AT/NC, no facial asymmetry  Lungs: no crackles, no wheezes  Abdomen: soft, non-tender  Extremities: warm, no edema, no tenderness to palpation, no focal deficit     PCP/Geriatrics follow-up,  GOC initiated with patient, she reports will think about advance directives.  Patient with severe OA, follow-up outpatient and not interested oin surgery  EKG with PACs, patient asymptomatic. Continue on monitoring with PCP

## 2024-01-26 NOTE — CONSULT NOTE ADULT - ASSESSMENT
{\rtf1\gbdrpm48870\ansi\ioxwavt0402\ftnbj\uc1\deff0  {\fonttbl{\f0 \fnil Segoe UI;}{\f1 \fnil \fcharset0 Segoe UI;}{\f2 \fnil Times New Vasyl;}}  {\colortbl ;\emo227\cgkkc038\dyzp625 ;\red0\green0\blue0 ;\red0\green0\djso990 ;\red0\green0\blue0 ;}  {\stylesheet{\f0\fs20 Normal;}{\cs1 Default Paragraph Font;}{\cs2\f0\fs16 Line Number;}{\cs3\f2\fs24\ul\cf3 Hyperlink;}}  {\*\revtbl{Unknown;}}  \fancmn76287\ygklri72716\nujdn8903\qpzzd9703\ommkr6793\yslcf1489\ufwynou396\qqjvhvj799\nogrowautofit\sonqlq989\formshade\nofeaturethrottle1\dntblnsbdb\fet4\aendnotes\aftnnrlc\pgbrdrhead\pgbrdrfoot  \sectd\lawfpb15079\sfjkjj30520\guttersxn0\dlwjfdzt5645\ngwztkvm5182\hvfgsxvn4898\sqdodsve4288\wxoiitx212\docfakv133\sbkpage\pgncont\pgndec  \plain\plain\f0\fs24\ql\plain\f0\fs24\plain\f0\fs20\mkjg8955\hich\f0\dbch\f0\loch\f0\fs20\par  I M\par  \par  89 y o F with PMH of urinary retention/overactive bladder, breast cancer (s/p right mastectomy), hard of hearing, and osteoporosis, BIBEMS for back pain s/p mechanical fall at home. \par  \par  \plain\f1\fs20\yurm7580\hich\f1\dbch\f1\loch\f1\cf2\fs20\ul{\field{\*\fldinst HYPERLINK [id number] }{\fldrslt Problem/Plan - 1:}}\plain\f0\fs20\uhty3517\hich\f0\dbch\f0\loch\f0\fs20\ql\par  \'b7  {\*\bkmkstart [id number]}{\*\bkmkend ad07685665889}Problem: {\*\bkmkstart cj00060555931}{\*\bkmkend er78428159752}URI due to influenza.\plain\f1\fs20\sezo9932\hich\f1\dbch\f1\loch\f1\cf2\fs20\strike\plain\f0\fs20\hnxi9397\hich\f0\dbch\f0\loch\f0\fs20\par  \'b7  {\*\bkmkstart fc80730822404}{\*\bkmkend xy12578669446}Plan: {\*\bkmkstart fp37016719492}{\*\bkmkend zx39499900202}P/w 2d of cough, general malaise. Found + Influenza AH3, w/ mod-severe exp. wheeze b/l on exam.\par  - Admission CXR: perihilar opacities\par  - CrCl 01/25 : ~44.0\par  \par  - Start Tamiflu 75mg qd today, followed by 30mg BID for total 5d - renal dosing, per pharm\par  - Start Duonebs q6h\par  - Incentive spirometry.\plain\f1\fs20\nrvp8361\hich\f1\dbch\f1\loch\f1\cf2\fs20\strike\plain\f0\fs20\tcsg0805\hich\f0\dbch\f0\loch\f0\fs20\par  \par  \plain\f1\fs20\xzpi2929\hich\f1\dbch\f1\loch\f1\cf2\fs20\ul{\field{\*\fldinst HYPERLINK [id number] }{\fldrslt Problem/Plan - 2:}}\plain\f0\fs20\cspp0582\hich\f0\dbch\f0\loch\f0\fs20\ql\par  \'b7  {\*\bkmkstart cw40574336635}{\*\bkmkend fc89500096290}Problem: {\*\bkmkstart tw67340627391}{\*\bkmkend dd60830301941}Fall.\plain\f1\fs20\wyxc0860\hich\f1\dbch\f1\loch\f1\cf2\fs20\strike\plain\f0\fs20\kvgg7097\hich\f0\dbch\f0\loch\f0\fs20\par  \'b7  {\*\bkmkstart dc80677732439}{\*\bkmkend su97068309790}Plan: {\*\bkmkstart rl58390163994}{\*\bkmkend tl64546279356}Mechanical ISO URI, w/ possible arrhythmogenic component | Pt presenting for fall after struggling to get out of bed, requiring help   from HHA. Pt usually able to get out of bed to walker but today felt too weak to get up and fell to her hands and knees as her HHA was helping her up. Denies striking her head, LOC, prodromal sx, post-ictal state. Admits to having a URI over the past   week with coughing, sneezing, rhinorrhea. \par  - 01/25: irregular rhythm, w/ "skipped beats" on exam. Pt denies palpitations. \par  \par  - F/u EKG\par  - F/u PT/OT recs.\plain\f1\fs20\gvqq1604\hich\f1\dbch\f1\loch\f1\cf2\fs20\strike\plain\f0\fs20\mymz0407\hich\f0\dbch\f0\loch\f0\fs20\par  \par  \plain\f1\fs20\weci7920\hich\f1\dbch\f1\loch\f1\cf2\fs20\ul{\field{\*\fldinst HYPERLINK [id number] }{\fldrslt Problem/Plan - 3:}}\plain\f0\fs20\hknp9094\hich\f0\dbch\f0\loch\f0\fs20\ql\par  \'b7  {\*\bkmkstart xo64416111409}{\*\bkmkend ye29297858871}Problem: {\*\bkmkstart yt73999873329}{\*\bkmkend xf33511772271}Constipation. \par  \'b7  {\*\bkmkstart rb11692149981}{\*\bkmkend mq13893230966}Plan:\plain\f1\fs20\nbsj1338\hich\f1\dbch\f1\loch\f1\cf2\fs20\strike\plain\f0\fs20\vcjd7191\hich\f0\dbch\f0\loch\f0\fs20  {\*\bkmkstart iv04051091575}{\*\bkmkend vh12650236089}Pt reports not   passing BM in 2 days iso poor PO intake 2/2 recent URI\par  \par  - Start dulcolax supp for ?impaction on admission imaging\par  - C/w senna\par  - C/w miralax.\par  \par  \plain\f1\fs20\jqqm5347\hich\f1\dbch\f1\loch\f1\cf2\fs20\ul{\field{\*\fldinst HYPERLINK [id number] }{\fldrslt Problem/Plan - 4:}}\plain\f0\fs20\mdlx1136\hich\f0\dbch\f0\loch\f0\fs20\ql\par  \'b7  {\*\bkmkstart ks81052686009}{\*\bkmkend no64413496518}Problem: {\*\bkmkstart mo51874294121}{\*\bkmkend ch69996495329}Osteoarthritis. \par  \'b7  {\*\bkmkstart de41528199951}{\*\bkmkend cs35379951940}Plan:\plain\f1\fs20\xopy4109\hich\f1\dbch\f1\loch\f1\cf2\fs20\strike\plain\f0\fs20\ssgg7269\hich\f0\dbch\f0\loch\f0\fs20  {\*\bkmkstart qk80695782284}{\*\bkmkend sc51343060602}Pt with severe   osteoarthritis of R knee s/p cortisone injections. Says she takes tylenol every day for the pain and is due to see her orthopedic surgeon for another cortisone injection this week.\par  \par  - Pain mgmt as below: \par       - D/c ketorolac for moderate pain, given age\par       - C/w tylenol for mild pain \par       - C/w lidocaine patch qd.\par  \par  \plain\f1\fs20\avbc9298\hich\f1\dbch\f1\loch\f1\cf2\fs20\ul{\field{\*\fldinst HYPERLINK [id number] }{\fldrslt Problem/Plan - 5:}}\plain\f0\fs20\ycen8125\hich\f0\dbch\f0\loch\f0\fs20\ql\par  \'b7  {\*\bkmkstart eg54255752916}{\*\bkmkend iv25455587335}Problem: {\*\bkmkstart md11071099287}{\*\bkmkend ko38354918327}Memory change. \par  \'b7  {\*\bkmkstart xs51757162665}{\*\bkmkend go75482427743}Plan:\plain\f1\fs20\rozi2587\hich\f1\dbch\f1\loch\f1\cf2\fs20\strike\plain\f0\fs20\ppzv7823\hich\f0\dbch\f0\loch\f0\fs20  {\*\bkmkstart in51616656522}{\*\bkmkend lu02642191615}Pt reports memory   changes over the past year with more forgetfulness. Believes she is in the early stages of dementia. Takes an OTC medication for memory but forgot its name. \par  \par  - Ensure outpatient neurology follow-up.\par  \par  \plain\f1\fs20\kztz1708\hich\f1\dbch\f1\loch\f1\cf2\fs20\ul{\field{\*\fldinst HYPERLINK [id number] }{\fldrslt Problem/Plan - 6:}}\plain\f0\fs20\ukge7422\hich\f0\dbch\f0\loch\f0\fs20\ql\par  \'b7  {\*\bkmkstart pa78756540239}{\*\bkmkend zy59380597703}Problem: {\*\bkmkstart io92667268892}{\*\bkmkend tm72881721126}Prophylactic measure. \par  \'b7  {\*\bkmkstart py19584815649}{\*\bkmkend ca50535963987}Plan:\plain\f1\fs20\uyyz7321\hich\f1\dbch\f1\loch\f1\cf2\fs20\strike\plain\f0\fs20\zlqb9423\hich\f0\dbch\f0\loch\f0\fs20  {\*\bkmkstart ea96506118212}{\*\bkmkend uu14132356976}F: S/p 1L NS in   ED\par  E: K>4, Mg >2\par  N: Regular diet\par  D: Lovenox\par  Gi: None\par  Dispo: RMF.\par  }

## 2024-01-26 NOTE — PROGRESS NOTE ADULT - PROBLEM SELECTOR PLAN 2
Numerous PACs on admission EKG. 01/26: Initiated [facility] conversation regarding code status and preferences for pursuing tx for new PACs. Regarding PACs, pt was educated on her arrhythmia, as well as risks/benefits of pursuing and not pursuing tx; after discussion, pt voiced preference to defer treatment at this time, stating she would like to "leave it alone" because it is "not bothering her" at this time. Regarding code status, patient previously unaware of the meaning of intubations, CPR, and chest compressions. Pt educated on the intervention and the risks/benefits of each. Pt admits difficulty to making definitive decision so soon after education, and would like to remain full code for now until she has had more time to solidify preferences.

## 2024-01-27 ENCOUNTER — TRANSCRIPTION ENCOUNTER (OUTPATIENT)
Age: 89
End: 2024-01-27

## 2024-01-27 VITALS
HEART RATE: 89 BPM | RESPIRATION RATE: 18 BRPM | TEMPERATURE: 98 F | SYSTOLIC BLOOD PRESSURE: 114 MMHG | OXYGEN SATURATION: 95 % | DIASTOLIC BLOOD PRESSURE: 68 MMHG

## 2024-01-27 LAB
ANION GAP SERPL CALC-SCNC: 10 MMOL/L — SIGNIFICANT CHANGE UP (ref 5–17)
BASOPHILS # BLD AUTO: 0.01 K/UL — SIGNIFICANT CHANGE UP (ref 0–0.2)
BASOPHILS NFR BLD AUTO: 0.3 % — SIGNIFICANT CHANGE UP (ref 0–2)
BUN SERPL-MCNC: 14 MG/DL — SIGNIFICANT CHANGE UP (ref 7–23)
CALCIUM SERPL-MCNC: 8.2 MG/DL — LOW (ref 8.4–10.5)
CHLORIDE SERPL-SCNC: 100 MMOL/L — SIGNIFICANT CHANGE UP (ref 96–108)
CO2 SERPL-SCNC: 24 MMOL/L — SIGNIFICANT CHANGE UP (ref 22–31)
CREAT SERPL-MCNC: 0.63 MG/DL — SIGNIFICANT CHANGE UP (ref 0.5–1.3)
EGFR: 83 ML/MIN/1.73M2 — SIGNIFICANT CHANGE UP
EOSINOPHIL # BLD AUTO: 0.01 K/UL — SIGNIFICANT CHANGE UP (ref 0–0.5)
EOSINOPHIL NFR BLD AUTO: 0.3 % — SIGNIFICANT CHANGE UP (ref 0–6)
GLUCOSE SERPL-MCNC: 108 MG/DL — HIGH (ref 70–99)
HCT VFR BLD CALC: 35 % — SIGNIFICANT CHANGE UP (ref 34.5–45)
HGB BLD-MCNC: 11.8 G/DL — SIGNIFICANT CHANGE UP (ref 11.5–15.5)
IMM GRANULOCYTES NFR BLD AUTO: 0.3 % — SIGNIFICANT CHANGE UP (ref 0–0.9)
LYMPHOCYTES # BLD AUTO: 0.85 K/UL — LOW (ref 1–3.3)
LYMPHOCYTES # BLD AUTO: 24.1 % — SIGNIFICANT CHANGE UP (ref 13–44)
MAGNESIUM SERPL-MCNC: 2 MG/DL — SIGNIFICANT CHANGE UP (ref 1.6–2.6)
MCHC RBC-ENTMCNC: 33.1 PG — SIGNIFICANT CHANGE UP (ref 27–34)
MCHC RBC-ENTMCNC: 33.7 GM/DL — SIGNIFICANT CHANGE UP (ref 32–36)
MCV RBC AUTO: 98.3 FL — SIGNIFICANT CHANGE UP (ref 80–100)
MONOCYTES # BLD AUTO: 0.51 K/UL — SIGNIFICANT CHANGE UP (ref 0–0.9)
MONOCYTES NFR BLD AUTO: 14.4 % — HIGH (ref 2–14)
NEUTROPHILS # BLD AUTO: 2.14 K/UL — SIGNIFICANT CHANGE UP (ref 1.8–7.4)
NEUTROPHILS NFR BLD AUTO: 60.6 % — SIGNIFICANT CHANGE UP (ref 43–77)
NRBC # BLD: 0 /100 WBCS — SIGNIFICANT CHANGE UP (ref 0–0)
PHOSPHATE SERPL-MCNC: 2.8 MG/DL — SIGNIFICANT CHANGE UP (ref 2.5–4.5)
PLATELET # BLD AUTO: 171 K/UL — SIGNIFICANT CHANGE UP (ref 150–400)
POTASSIUM SERPL-MCNC: 3.8 MMOL/L — SIGNIFICANT CHANGE UP (ref 3.5–5.3)
POTASSIUM SERPL-SCNC: 3.8 MMOL/L — SIGNIFICANT CHANGE UP (ref 3.5–5.3)
RBC # BLD: 3.56 M/UL — LOW (ref 3.8–5.2)
RBC # FLD: 13 % — SIGNIFICANT CHANGE UP (ref 10.3–14.5)
SODIUM SERPL-SCNC: 134 MMOL/L — LOW (ref 135–145)
WBC # BLD: 3.53 K/UL — LOW (ref 3.8–10.5)
WBC # FLD AUTO: 3.53 K/UL — LOW (ref 3.8–10.5)

## 2024-01-27 PROCEDURE — 84100 ASSAY OF PHOSPHORUS: CPT

## 2024-01-27 PROCEDURE — 94640 AIRWAY INHALATION TREATMENT: CPT

## 2024-01-27 PROCEDURE — 97165 OT EVAL LOW COMPLEX 30 MIN: CPT

## 2024-01-27 PROCEDURE — 71045 X-RAY EXAM CHEST 1 VIEW: CPT

## 2024-01-27 PROCEDURE — 0225U NFCT DS DNA&RNA 21 SARSCOV2: CPT

## 2024-01-27 PROCEDURE — 36415 COLL VENOUS BLD VENIPUNCTURE: CPT

## 2024-01-27 PROCEDURE — 73522 X-RAY EXAM HIPS BI 3-4 VIEWS: CPT

## 2024-01-27 PROCEDURE — 81001 URINALYSIS AUTO W/SCOPE: CPT

## 2024-01-27 PROCEDURE — 99285 EMERGENCY DEPT VISIT HI MDM: CPT | Mod: 25

## 2024-01-27 PROCEDURE — 97161 PT EVAL LOW COMPLEX 20 MIN: CPT

## 2024-01-27 PROCEDURE — 85025 COMPLETE CBC W/AUTO DIFF WBC: CPT

## 2024-01-27 PROCEDURE — 84145 PROCALCITONIN (PCT): CPT

## 2024-01-27 PROCEDURE — 83735 ASSAY OF MAGNESIUM: CPT

## 2024-01-27 PROCEDURE — 96374 THER/PROPH/DIAG INJ IV PUSH: CPT

## 2024-01-27 PROCEDURE — 99239 HOSP IP/OBS DSCHRG MGMT >30: CPT

## 2024-01-27 PROCEDURE — 87086 URINE CULTURE/COLONY COUNT: CPT

## 2024-01-27 PROCEDURE — 80053 COMPREHEN METABOLIC PANEL: CPT

## 2024-01-27 PROCEDURE — 80048 BASIC METABOLIC PNL TOTAL CA: CPT

## 2024-01-27 PROCEDURE — 73562 X-RAY EXAM OF KNEE 3: CPT

## 2024-01-27 RX ORDER — ESCITALOPRAM OXALATE 10 MG/1
5 TABLET, FILM COATED ORAL EVERY 24 HOURS
Refills: 0 | Status: DISCONTINUED | OUTPATIENT
Start: 2024-01-27 | End: 2024-01-27

## 2024-01-27 RX ORDER — LIDOCAINE 4 G/100G
1 CREAM TOPICAL
Qty: 0 | Refills: 0 | DISCHARGE
Start: 2024-01-27

## 2024-01-27 RX ORDER — IPRATROPIUM/ALBUTEROL SULFATE 18-103MCG
3 AEROSOL WITH ADAPTER (GRAM) INHALATION
Qty: 0 | Refills: 0 | DISCHARGE
Start: 2024-01-27

## 2024-01-27 RX ORDER — LANOLIN ALCOHOL/MO/W.PET/CERES
1 CREAM (GRAM) TOPICAL
Qty: 0 | Refills: 0 | DISCHARGE
Start: 2024-01-27

## 2024-01-27 RX ADMIN — ESCITALOPRAM OXALATE 5 MILLIGRAM(S): 10 TABLET, FILM COATED ORAL at 11:14

## 2024-01-27 RX ADMIN — Medication 30 MILLIGRAM(S): at 11:14

## 2024-01-27 RX ADMIN — Medication 650 MILLIGRAM(S): at 11:16

## 2024-01-27 RX ADMIN — Medication 30 MILLIGRAM(S): at 00:19

## 2024-01-27 RX ADMIN — Medication 3 MILLILITER(S): at 06:49

## 2024-01-27 RX ADMIN — Medication 3 MILLILITER(S): at 00:20

## 2024-01-27 RX ADMIN — Medication 650 MILLIGRAM(S): at 10:16

## 2024-01-27 NOTE — DISCHARGE NOTE NURSING/CASE MANAGEMENT/SOCIAL WORK - PATIENT PORTAL LINK FT
You can access the FollowMyHealth Patient Portal offered by [facility] by registering at the following website: [email or web address]. By joining [employer]’s FollowMyHealth portal, you will also be able to view your health information using other applications (apps) compatible with our system.

## 2024-01-29 ENCOUNTER — APPOINTMENT (OUTPATIENT)
Dept: ORTHOPEDIC SURGERY | Facility: CLINIC | Age: 89
End: 2024-01-29

## 2024-02-06 NOTE — HISTORY OF PRESENT ILLNESS
[de-identified] : Ms. Das is 89 yo and comes in for right knee pain.  She had Durolane in last visit in October

## 2024-02-06 NOTE — PHYSICAL EXAM
[Shuffling] : shuffling [Wheelchair] : uses a wheelchair [LE] : Sensory: Intact in bilateral lower extremities [Normal RLE] : Right Lower Extremity: No scars, rashes, lesions, ulcers, skin intact [Normal LLE] : Left Lower Extremity: No scars, rashes, lesions, ulcers, skin intact [Normal Touch] : sensation intact for touch [Normal] : Oriented to person, place, and time, insight and judgement were intact and the affect was normal [de-identified] : Knees She comes in in a wheelchair.  She needs assistance to even take a few steps and to get up on the table is challenging. No effusion.  No erythema or edema or warmth. Tender right greater than left knee patella facets and medial greater than lateral joint line Right knee range of motion is from about 10 degree flexion contracture to 110 degrees on the right with pain and patellofemoral crepitus. On the left knee 0 to 130 degrees with mild discomfort. Ia Lachman. Negative Patricia 1+ varus hyperlaxity. Intact extensor mechanism.   [de-identified] :  X-rays of right knee 5/10/23 weightbearing 4 views compared to x-rays bilateral knees 4 views weightbearing December 2021 showed severe degenerative changes in the right knee with medial joint space narrowing and osteophytes bone-on-bone on the flexed view and severe patellofemoral degenerative changes. There has been some progression in the medial compartment arthritis

## 2024-02-08 DIAGNOSIS — M17.11 UNILATERAL PRIMARY OSTEOARTHRITIS, RIGHT KNEE: ICD-10-CM

## 2024-02-08 DIAGNOSIS — M81.0 AGE-RELATED OSTEOPOROSIS WITHOUT CURRENT PATHOLOGICAL FRACTURE: ICD-10-CM

## 2024-02-08 DIAGNOSIS — R33.9 RETENTION OF URINE, UNSPECIFIED: ICD-10-CM

## 2024-02-08 DIAGNOSIS — K59.00 CONSTIPATION, UNSPECIFIED: ICD-10-CM

## 2024-02-08 DIAGNOSIS — J10.1 INFLUENZA DUE TO OTHER IDENTIFIED INFLUENZA VIRUS WITH OTHER RESPIRATORY MANIFESTATIONS: ICD-10-CM

## 2024-02-08 DIAGNOSIS — I49.1 ATRIAL PREMATURE DEPOLARIZATION: ICD-10-CM

## 2024-02-08 DIAGNOSIS — Z85.3 PERSONAL HISTORY OF MALIGNANT NEOPLASM OF BREAST: ICD-10-CM

## 2024-02-08 DIAGNOSIS — R41.3 OTHER AMNESIA: ICD-10-CM

## 2024-02-08 DIAGNOSIS — N32.81 OVERACTIVE BLADDER: ICD-10-CM

## 2024-02-27 ENCOUNTER — APPOINTMENT (OUTPATIENT)
Dept: ORTHOPEDIC SURGERY | Facility: CLINIC | Age: 89
End: 2024-02-27
Payer: MEDICARE

## 2024-02-27 PROCEDURE — 72082 X-RAY EXAM ENTIRE SPI 2/3 VW: CPT

## 2024-02-27 PROCEDURE — 99213 OFFICE O/P EST LOW 20 MIN: CPT

## 2024-02-28 ENCOUNTER — APPOINTMENT (OUTPATIENT)
Dept: PAIN MANAGEMENT | Facility: CLINIC | Age: 89
End: 2024-02-28

## 2024-02-28 VITALS
OXYGEN SATURATION: 95 % | BODY MASS INDEX: 23.7 KG/M2 | HEIGHT: 62 IN | HEART RATE: 58 BPM | DIASTOLIC BLOOD PRESSURE: 76 MMHG | SYSTOLIC BLOOD PRESSURE: 120 MMHG | WEIGHT: 128.8 LBS

## 2024-02-28 PROCEDURE — 64491 INJ PARAVERT F JNT C/T 2 LEV: CPT | Mod: 50

## 2024-02-28 PROCEDURE — 64490 INJ PARAVERT F JNT C/T 1 LEV: CPT | Mod: 50

## 2024-02-28 PROCEDURE — 99204 OFFICE O/P NEW MOD 45 MIN: CPT | Mod: 25

## 2024-03-01 RX ORDER — TRAMADOL HYDROCHLORIDE AND ACETAMINOPHEN 37.5; 325 MG/1; MG/1
37.5-325 TABLET, FILM COATED ORAL 3 TIMES DAILY
Qty: 21 | Refills: 0 | Status: ACTIVE | OUTPATIENT
Start: 2024-03-01

## 2024-03-03 VITALS
DIASTOLIC BLOOD PRESSURE: 73 MMHG | HEIGHT: 62 IN | RESPIRATION RATE: 17 BRPM | SYSTOLIC BLOOD PRESSURE: 119 MMHG | WEIGHT: 119.93 LBS | HEART RATE: 88 BPM | TEMPERATURE: 98 F | OXYGEN SATURATION: 100 %

## 2024-03-03 LAB
ALBUMIN SERPL ELPH-MCNC: 3.4 G/DL — SIGNIFICANT CHANGE UP (ref 3.3–5)
ALP SERPL-CCNC: 67 U/L — SIGNIFICANT CHANGE UP (ref 40–120)
ALT FLD-CCNC: 9 U/L — LOW (ref 10–45)
ANION GAP SERPL CALC-SCNC: 11 MMOL/L — SIGNIFICANT CHANGE UP (ref 5–17)
ANISOCYTOSIS BLD QL: SLIGHT — SIGNIFICANT CHANGE UP
AST SERPL-CCNC: 14 U/L — SIGNIFICANT CHANGE UP (ref 10–40)
BASOPHILS # BLD AUTO: 0.11 K/UL — SIGNIFICANT CHANGE UP (ref 0–0.2)
BASOPHILS NFR BLD AUTO: 0.9 % — SIGNIFICANT CHANGE UP (ref 0–2)
BILIRUB SERPL-MCNC: 0.3 MG/DL — SIGNIFICANT CHANGE UP (ref 0.2–1.2)
BUN SERPL-MCNC: 43 MG/DL — HIGH (ref 7–23)
BURR CELLS BLD QL SMEAR: PRESENT — SIGNIFICANT CHANGE UP
CALCIUM SERPL-MCNC: 8.5 MG/DL — SIGNIFICANT CHANGE UP (ref 8.4–10.5)
CHLORIDE SERPL-SCNC: 101 MMOL/L — SIGNIFICANT CHANGE UP (ref 96–108)
CO2 SERPL-SCNC: 26 MMOL/L — SIGNIFICANT CHANGE UP (ref 22–31)
CREAT SERPL-MCNC: 0.68 MG/DL — SIGNIFICANT CHANGE UP (ref 0.5–1.3)
EGFR: 81 ML/MIN/1.73M2 — SIGNIFICANT CHANGE UP
EOSINOPHIL # BLD AUTO: 0 K/UL — SIGNIFICANT CHANGE UP (ref 0–0.5)
EOSINOPHIL NFR BLD AUTO: 0 % — SIGNIFICANT CHANGE UP (ref 0–6)
GLUCOSE SERPL-MCNC: 127 MG/DL — HIGH (ref 70–99)
HCT VFR BLD CALC: 18.7 % — CRITICAL LOW (ref 34.5–45)
HCT VFR BLD CALC: 20.5 % — CRITICAL LOW (ref 34.5–45)
HGB BLD-MCNC: 6.3 G/DL — CRITICAL LOW (ref 11.5–15.5)
HGB BLD-MCNC: 6.7 G/DL — CRITICAL LOW (ref 11.5–15.5)
HYPOCHROMIA BLD QL: SIGNIFICANT CHANGE UP
LACTATE SERPL-SCNC: 1.1 MMOL/L — SIGNIFICANT CHANGE UP (ref 0.5–2)
LYMPHOCYTES # BLD AUTO: 1.43 K/UL — SIGNIFICANT CHANGE UP (ref 1–3.3)
LYMPHOCYTES # BLD AUTO: 11.4 % — LOW (ref 13–44)
MACROCYTES BLD QL: SLIGHT — SIGNIFICANT CHANGE UP
MANUAL SMEAR VERIFICATION: SIGNIFICANT CHANGE UP
MCHC RBC-ENTMCNC: 32.7 GM/DL — SIGNIFICANT CHANGE UP (ref 32–36)
MCHC RBC-ENTMCNC: 33.7 GM/DL — SIGNIFICANT CHANGE UP (ref 32–36)
MCHC RBC-ENTMCNC: 33.7 PG — SIGNIFICANT CHANGE UP (ref 27–34)
MCHC RBC-ENTMCNC: 33.7 PG — SIGNIFICANT CHANGE UP (ref 27–34)
MCV RBC AUTO: 100 FL — SIGNIFICANT CHANGE UP (ref 80–100)
MCV RBC AUTO: 103 FL — HIGH (ref 80–100)
MICROCYTES BLD QL: SLIGHT — SIGNIFICANT CHANGE UP
MONOCYTES # BLD AUTO: 0.44 K/UL — SIGNIFICANT CHANGE UP (ref 0–0.9)
MONOCYTES NFR BLD AUTO: 3.5 % — SIGNIFICANT CHANGE UP (ref 2–14)
NEUTROPHILS # BLD AUTO: 10.58 K/UL — HIGH (ref 1.8–7.4)
NEUTROPHILS NFR BLD AUTO: 84.2 % — HIGH (ref 43–77)
NRBC # BLD: 0 /100 WBCS — SIGNIFICANT CHANGE UP (ref 0–0)
OB PNL STL: NEGATIVE — SIGNIFICANT CHANGE UP
OVALOCYTES BLD QL SMEAR: SLIGHT — SIGNIFICANT CHANGE UP
PLAT MORPH BLD: NORMAL — SIGNIFICANT CHANGE UP
PLATELET # BLD AUTO: 336 K/UL — SIGNIFICANT CHANGE UP (ref 150–400)
PLATELET # BLD AUTO: 344 K/UL — SIGNIFICANT CHANGE UP (ref 150–400)
POIKILOCYTOSIS BLD QL AUTO: SLIGHT — SIGNIFICANT CHANGE UP
POLYCHROMASIA BLD QL SMEAR: SLIGHT — SIGNIFICANT CHANGE UP
POTASSIUM SERPL-MCNC: 3.5 MMOL/L — SIGNIFICANT CHANGE UP (ref 3.5–5.3)
POTASSIUM SERPL-SCNC: 3.5 MMOL/L — SIGNIFICANT CHANGE UP (ref 3.5–5.3)
PROT SERPL-MCNC: 5.9 G/DL — LOW (ref 6–8.3)
RBC # BLD: 1.87 M/UL — LOW (ref 3.8–5.2)
RBC # BLD: 1.99 M/UL — LOW (ref 3.8–5.2)
RBC # FLD: 15 % — HIGH (ref 10.3–14.5)
RBC # FLD: 15.2 % — HIGH (ref 10.3–14.5)
RBC BLD AUTO: ABNORMAL
SODIUM SERPL-SCNC: 138 MMOL/L — SIGNIFICANT CHANGE UP (ref 135–145)
WBC # BLD: 12.57 K/UL — HIGH (ref 3.8–10.5)
WBC # BLD: 12.98 K/UL — HIGH (ref 3.8–10.5)
WBC # FLD AUTO: 12.57 K/UL — HIGH (ref 3.8–10.5)
WBC # FLD AUTO: 12.98 K/UL — HIGH (ref 3.8–10.5)

## 2024-03-03 PROCEDURE — 72131 CT LUMBAR SPINE W/O DYE: CPT | Mod: 26,MC

## 2024-03-03 PROCEDURE — 71045 X-RAY EXAM CHEST 1 VIEW: CPT | Mod: 26

## 2024-03-03 PROCEDURE — 72128 CT CHEST SPINE W/O DYE: CPT | Mod: 26,MC

## 2024-03-03 PROCEDURE — 99285 EMERGENCY DEPT VISIT HI MDM: CPT

## 2024-03-03 RX ORDER — TRAMADOL HYDROCHLORIDE 50 MG/1
50 TABLET ORAL ONCE
Refills: 0 | Status: DISCONTINUED | OUTPATIENT
Start: 2024-03-03 | End: 2024-03-03

## 2024-03-03 RX ORDER — ACETAMINOPHEN 500 MG
1000 TABLET ORAL ONCE
Refills: 0 | Status: COMPLETED | OUTPATIENT
Start: 2024-03-03 | End: 2024-03-03

## 2024-03-03 RX ORDER — MORPHINE SULFATE 50 MG/1
2 CAPSULE, EXTENDED RELEASE ORAL ONCE
Refills: 0 | Status: DISCONTINUED | OUTPATIENT
Start: 2024-03-03 | End: 2024-03-03

## 2024-03-03 RX ORDER — ACETAMINOPHEN 500 MG
975 TABLET ORAL ONCE
Refills: 0 | Status: COMPLETED | OUTPATIENT
Start: 2024-03-03 | End: 2024-03-03

## 2024-03-03 RX ADMIN — MORPHINE SULFATE 2 MILLIGRAM(S): 50 CAPSULE, EXTENDED RELEASE ORAL at 19:37

## 2024-03-03 RX ADMIN — Medication 975 MILLIGRAM(S): at 20:01

## 2024-03-03 RX ADMIN — TRAMADOL HYDROCHLORIDE 50 MILLIGRAM(S): 50 TABLET ORAL at 20:01
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
## 2024-03-03 NOTE — ED ADULT NURSE NOTE - CHIEF COMPLAINT QUOTE
89F BIBA c/o atraumatic lower back pain since Thursday. pt recently hospitalized for flu, discharged to rehab and now at home with 24 hour care. pt states she uses a rollator/cane and now a wheelchair due to chronic LE pain. denies urinary/bowel incontinence or numbness/tingling. RR easy, even, un-labored on room air

## 2024-03-03 NOTE — ED ADULT TRIAGE NOTE - CHIEF COMPLAINT QUOTE
89F BIBA c/o non-traumatic lower back pain since Thursday. pt recently hospitalized for flu, discharged to rehab and now at home with 24 hour care. pt states she uses a rollator/cane and now a wheelchair due to chronic LE pain. denies urinary/bowel incontinence or numbness/tingling. RR easy, even, un-labored on room air 89F BIBA c/o atraumatic lower back pain since Thursday. pt recently hospitalized for flu, discharged to rehab and now at home with 24 hour care. pt states she uses a rollator/cane and now a wheelchair due to chronic LE pain. denies urinary/bowel incontinence or numbness/tingling. RR easy, even, un-labored on room air

## 2024-03-03 NOTE — ED PROVIDER NOTE - CLINICAL SUMMARY MEDICAL DECISION MAKING FREE TEXT BOX
89 yo F PMH noted above presents with back pain.  Although she is currently undergoing outpatient treatment and testing with Dr. Stevens, her pain is not responding to these measures.  We will obtain CTs for now, give medication for pain.  In light of the severity of the pain and the inability to achieve relief with tramadol/tylenol, will likely admit for further care. 89 yo F PMH noted above presents with back pain.  Although she is currently undergoing outpatient treatment and testing with Dr. Stevens, her pain is not responding to these measures.  We will obtain CTs for now, give medication for pain.  In light of the severity of the pain and the inability to achieve relief with tramadol/tylenol, will likely admit for further care.    21:00:  Patient's IV had fallen out and I replace it now.  She is still having significant pain.  informed of plan for repeat labs and possible need for transfusion.  Rectal exam done and is as noted above. 89 yo F PMH noted above presents with back pain.  Although she is currently undergoing outpatient treatment and testing with Dr. Stevens, her pain is not responding to these measures.  We will obtain CTs for now, give medication for pain.  In light of the severity of the pain and the inability to achieve relief with tramadol/tylenol, will likely admit for further care.    21:00:  Patient's IV had fallen out and I replace it now.  She is still having significant pain.  informed of plan for repeat labs and possible need for transfusion.  Rectal exam done and is as noted above.    22:30:  Repeat cbc unchanged.  Patient denies any source of bleeding and aide agrees.  Stool was negative for blood.  Discussed with patient need for transfusion and she verbalized understanding and signed consent.    22:50:  Case d/w BETY.  CT abdomen and pelvis requested before admission.   CT ordered. Patient will be signed out pending CT and further discussion with admitting team. 89 yo F PMH noted above presents with back pain.  Although she is currently undergoing outpatient treatment and testing with Dr. Stevens, her pain is not responding to these measures.  We will obtain CTs for now, give medication for pain.  In light of the severity of the pain and the inability to achieve relief with tramadol/tylenol, will likely admit for further care.    21:00:  Patient's IV had fallen out and I replace it now.  She is still having significant pain.  informed of plan for repeat labs and possible need for transfusion.  Rectal exam done and is as noted above.    22:30:  Repeat cbc unchanged.  Patient denies any source of bleeding and aide agrees.  Stool was negative for blood.  Discussed with patient need for transfusion and she verbalized understanding and signed consent.    23:50:  Case had been d/w BETY who requested abd/pelvic CTa prior to admission.  If CT normal and patient remains stable, will be accepted by BETY.  Patient signed out to night team pending transfusion, ct and report, disposition.   22:50:  Case d/w BETY.  CT abdomen and pelvis requested before admission.   CT ordered. Patient will be signed out pending CT and further discussion with admitting team.

## 2024-03-03 NOTE — ED ADULT TRIAGE NOTE - BANDS:
Impression: Combined forms of age-related cataract, bilateral: H25.813. Plan: Cataracts are mild and not visually significant or effecting ADLs/vision at this time. No treatment is currently warranted due to current level of vision. Patient to contact our office with any changes/ worsening in vision. Will re-evaluate on return visit. Fall Risk;

## 2024-03-03 NOTE — ED ADULT NURSE REASSESSMENT NOTE - NS ED NURSE REASSESS COMMENT FT1
1U PRBC infusing over an hour as per verbal order from Talat ABRAHAM. Pt denies s/s of transfusion reaction at this time.

## 2024-03-03 NOTE — ED PROVIDER NOTE - CARE PLAN
Principal Discharge DX:	Back pain   1 Principal Discharge DX:	Back pain  Secondary Diagnosis:	Severe anemia

## 2024-03-03 NOTE — ED ADULT NURSE REASSESSMENT NOTE - NS ED NURSE REASSESS COMMENT FT1
RN unable to obtain IV Access, other RN attempted as-well. Request for ultrasound guided IV made to Talat ABRAHAM.

## 2024-03-03 NOTE — ED PROVIDER NOTE - PHYSICAL EXAMINATION
General:  Well appearing, no distress.  Pain to the back with even small movements   HEENT:  No conjunctival injection, neck supple, no congestion   Chest:  Non-tender, no crepitance  Lungs:  Clear to auscultation bilaterally   Heart:  s1s2 normal, no murmur  Abdomen:  soft, non-tender, non-distended  :  Deferred  Rectal:  Deferred  Back:  There is severe tenderness from the mid thoracic spine to the sacrum.  No sts/bruising/redness/rash   Extremities: No edema, normal perfusion, no joint swelling or tenderness  Neuro:  Alert, conversant, motor/sensory grossly intact General:  Well appearing, no distress.  Pain to the back with even small movements   HEENT:  No conjunctival injection, neck supple, no congestion   Chest:  Non-tender, no crepitance  Lungs:  Clear to auscultation bilaterally   Heart:  s1s2 normal, no murmur  Abdomen:  soft, non-tender, non-distended  :  Deferred  Rectal:  Non-tender, no mass.  No blood.  Stool light brown/yellow.  Sent to lab for analysis.   Back:  There is severe tenderness from the mid thoracic spine to the sacrum.  No sts/bruising/redness/rash   Extremities: No edema, normal perfusion, no joint swelling or tenderness  Neuro:  Alert, conversant, motor/sensory grossly intact

## 2024-03-03 NOTE — ED ADULT NURSE NOTE - ISAR MEMORY
[FreeTextEntry1] : 8/6/21: 71 y/o female returns the office for follow-up. PMH:  DM2, HTN and A Fib. venous insufficiency, lymphedema and recurrent ulcers  She reports increased pain to her feet even with use of gabapentin 3 times a day.  In the past she states prednisone has helped but she never has had drastic improvement of symptoms.  She was advised to seek neurologic evaluation for symptoms but has not as of yet.  There continues to be improvement in edema to her legs with use of conservative treatment: Compression stockings 20 to 30 mmHg daily and lymphedema pumps.  She walks daily for exercise, elevates her legs at rest and ambulates frequently.  Pain is described as burning and tingling pain to her legs all day. She has been seeing a pain management specialist, but has not had relief of pain.  She takes ASA 81 mg, Coumadin and Pravastatin 10 mg QD. No current pain reported. No fever or chills. She is a non-smoker. \par \par 3/9/22: Pt still has leg pain since last visit. She feels the legs felt much better on prednisone. She has pain in her medial ankles bilaterally. She has been compliant with compression stockings. She does not use the pump because it feels it is too restrictive. She takes ASA 81 mg, Coumadin and Pravastatin 10 mg QD. Pt is still taking Gabapentin 800 mg. She does not take cymbalta. \par \par 6/24/22: Pt still has BLE leg pain. She feels the pain is improved since taking prednisone. She takes ASA 81 mg, Coumadin and Pravastatin 10 mg QD. Pt is still taking Gabapentin 800 mg. She does not take cymbalta. \par \par 7/8/22: Pt has increased LLE edema, erythema and pain.  She denies any recent fevers. She has been compliant with compression stockings. She takes ASA 81 mg, Coumadin and Pravastatin 10 mg QD. Pt is still taking Gabapentin 800 mg. She does not take cymbalta.  No

## 2024-03-03 NOTE — ED ADULT NURSE NOTE - NSFALLUNIVINTERV_ED_ALL_ED
Bed/Stretcher in lowest position, wheels locked, appropriate side rails in place/Call bell, personal items and telephone in reach/Instruct patient to call for assistance before getting out of bed/chair/stretcher/Non-slip footwear applied when patient is off stretcher/[place] to call system/Physically safe environment - no spills, clutter or unnecessary equipment/Purposeful proactive rounding/Room/bathroom lighting operational, light cord in reach

## 2024-03-03 NOTE — ED ADULT NURSE REASSESSMENT NOTE - NS ED NURSE REASSESS COMMENT FT1
Verbal order from Talat ABRAHAM to RN to infused 1U PRBCs in an hour and CT with contrast after PRBC infusion is completed.

## 2024-03-03 NOTE — ED ADULT NURSE NOTE - NSFALLRISKINTERV_ED_ALL_ED
Communicate fall risk and risk factors to all staff, patient, and family/Provide patient with walking aids/Provide visual cue: yellow wristband, yellow gown, etc/Reinforce activity limits and safety measures with patient and family/Call bell, personal items and telephone in reach/Instruct patient to call for assistance before getting out of bed/chair/stretcher/Non-slip footwear applied when patient is off stretcher/[place] to call system/Physically safe environment - no spills, clutter or unnecessary equipment/Purposeful Proactive Rounding/Room/bathroom lighting operational, light cord in reach

## 2024-03-03 NOTE — ED ADULT NURSE NOTE - OBJECTIVE STATEMENT
89 y/o F BIBEMS with HHA at side c/o mid and lower back pain for approx one week. Pt endorses chronic pain in b/l lower extremities. At this time pt denies numbness, tingling, saddle anesthesia, chest pain, SOB, dysuria, hematuria, urinary frequency. PT A&Ox4, respirations even and unlabored, skin color WDL warm and dry, pt is ambulatory with a steady gait. No acute distress observed. 89 y/o F BIBEMS with HHA at side c/o mid and lower back pain for approx one week. Pt endorses chronic pain in b/l lower extremities. At this time pt denies numbness, tingling, saddle anesthesia, chest pain, SOB, dysuria, hematuria, urinary frequency, blood in stool. PT A&Ox4, respirations even and unlabored, skin color WDL warm and dry.  No acute distress observed.

## 2024-03-04 ENCOUNTER — INPATIENT (INPATIENT)
Facility: HOSPITAL | Age: 89
LOS: 0 days | Discharge: HOME HEALTH | DRG: 543 | End: 2024-03-05
Attending: HOSPITALIST | Admitting: INTERNAL MEDICINE
Payer: COMMERCIAL

## 2024-03-04 ENCOUNTER — TRANSCRIPTION ENCOUNTER (OUTPATIENT)
Age: 89
End: 2024-03-04

## 2024-03-04 DIAGNOSIS — D64.9 ANEMIA, UNSPECIFIED: ICD-10-CM

## 2024-03-04 DIAGNOSIS — D62 ACUTE POSTHEMORRHAGIC ANEMIA: ICD-10-CM

## 2024-03-04 DIAGNOSIS — R33.9 RETENTION OF URINE, UNSPECIFIED: ICD-10-CM

## 2024-03-04 DIAGNOSIS — K59.00 CONSTIPATION, UNSPECIFIED: ICD-10-CM

## 2024-03-04 DIAGNOSIS — S32.000A WEDGE COMPRESSION FRACTURE OF UNSPECIFIED LUMBAR VERTEBRA, INITIAL ENCOUNTER FOR CLOSED FRACTURE: ICD-10-CM

## 2024-03-04 DIAGNOSIS — Z29.9 ENCOUNTER FOR PROPHYLACTIC MEASURES, UNSPECIFIED: ICD-10-CM

## 2024-03-04 DIAGNOSIS — M54.9 DORSALGIA, UNSPECIFIED: ICD-10-CM

## 2024-03-04 LAB
ADD ON TEST-SPECIMEN IN LAB: SIGNIFICANT CHANGE UP
ALBUMIN SERPL ELPH-MCNC: 3.3 G/DL — SIGNIFICANT CHANGE UP (ref 3.3–5)
ALBUMIN SERPL ELPH-MCNC: 3.4 G/DL — SIGNIFICANT CHANGE UP (ref 3.3–5)
ALP SERPL-CCNC: 66 U/L — SIGNIFICANT CHANGE UP (ref 40–120)
ALP SERPL-CCNC: 68 U/L — SIGNIFICANT CHANGE UP (ref 40–120)
ALT FLD-CCNC: 10 U/L — SIGNIFICANT CHANGE UP (ref 10–45)
ALT FLD-CCNC: 10 U/L — SIGNIFICANT CHANGE UP (ref 10–45)
ANION GAP SERPL CALC-SCNC: 5 MMOL/L — SIGNIFICANT CHANGE UP (ref 5–17)
ANION GAP SERPL CALC-SCNC: 9 MMOL/L — SIGNIFICANT CHANGE UP (ref 5–17)
APTT BLD: 28 SEC — SIGNIFICANT CHANGE UP (ref 24.5–35.6)
AST SERPL-CCNC: 14 U/L — SIGNIFICANT CHANGE UP (ref 10–40)
AST SERPL-CCNC: 19 U/L — SIGNIFICANT CHANGE UP (ref 10–40)
BASOPHILS # BLD AUTO: 0.03 K/UL — SIGNIFICANT CHANGE UP (ref 0–0.2)
BASOPHILS NFR BLD AUTO: 0.3 % — SIGNIFICANT CHANGE UP (ref 0–2)
BILIRUB SERPL-MCNC: 0.5 MG/DL — SIGNIFICANT CHANGE UP (ref 0.2–1.2)
BILIRUB SERPL-MCNC: 0.6 MG/DL — SIGNIFICANT CHANGE UP (ref 0.2–1.2)
BUN SERPL-MCNC: 31 MG/DL — HIGH (ref 7–23)
BUN SERPL-MCNC: 36 MG/DL — HIGH (ref 7–23)
CALCIUM SERPL-MCNC: 8.6 MG/DL — SIGNIFICANT CHANGE UP (ref 8.4–10.5)
CALCIUM SERPL-MCNC: 8.8 MG/DL — SIGNIFICANT CHANGE UP (ref 8.4–10.5)
CHLORIDE SERPL-SCNC: 100 MMOL/L — SIGNIFICANT CHANGE UP (ref 96–108)
CHLORIDE SERPL-SCNC: 100 MMOL/L — SIGNIFICANT CHANGE UP (ref 96–108)
CO2 SERPL-SCNC: 26 MMOL/L — SIGNIFICANT CHANGE UP (ref 22–31)
CO2 SERPL-SCNC: 32 MMOL/L — HIGH (ref 22–31)
CREAT SERPL-MCNC: 0.6 MG/DL — SIGNIFICANT CHANGE UP (ref 0.5–1.3)
CREAT SERPL-MCNC: 0.62 MG/DL — SIGNIFICANT CHANGE UP (ref 0.5–1.3)
EGFR: 83 ML/MIN/1.73M2 — SIGNIFICANT CHANGE UP
EGFR: 84 ML/MIN/1.73M2 — SIGNIFICANT CHANGE UP
EOSINOPHIL # BLD AUTO: 0.26 K/UL — SIGNIFICANT CHANGE UP (ref 0–0.5)
EOSINOPHIL NFR BLD AUTO: 2.5 % — SIGNIFICANT CHANGE UP (ref 0–6)
FERRITIN SERPL-MCNC: 163 NG/ML — SIGNIFICANT CHANGE UP (ref 13–330)
FOLATE SERPL-MCNC: 7.3 NG/ML — SIGNIFICANT CHANGE UP
GLUCOSE SERPL-MCNC: 103 MG/DL — HIGH (ref 70–99)
GLUCOSE SERPL-MCNC: 133 MG/DL — HIGH (ref 70–99)
HAPTOGLOB SERPL-MCNC: 277 MG/DL — HIGH (ref 34–200)
HCT VFR BLD CALC: 24.2 % — LOW (ref 34.5–45)
HCT VFR BLD CALC: 26 % — LOW (ref 34.5–45)
HGB BLD-MCNC: 7.9 G/DL — LOW (ref 11.5–15.5)
HGB BLD-MCNC: 8.5 G/DL — LOW (ref 11.5–15.5)
IMM GRANULOCYTES NFR BLD AUTO: 1.1 % — HIGH (ref 0–0.9)
INR BLD: 0.97 — SIGNIFICANT CHANGE UP (ref 0.85–1.18)
IRON SATN MFR SERPL: 126 UG/DL — SIGNIFICANT CHANGE UP (ref 30–160)
IRON SATN MFR SERPL: 49 % — SIGNIFICANT CHANGE UP (ref 14–50)
LDH SERPL L TO P-CCNC: 187 U/L — SIGNIFICANT CHANGE UP (ref 50–242)
LYMPHOCYTES # BLD AUTO: 1.83 K/UL — SIGNIFICANT CHANGE UP (ref 1–3.3)
LYMPHOCYTES # BLD AUTO: 17.5 % — SIGNIFICANT CHANGE UP (ref 13–44)
MAGNESIUM SERPL-MCNC: 2.3 MG/DL — SIGNIFICANT CHANGE UP (ref 1.6–2.6)
MAGNESIUM SERPL-MCNC: 2.3 MG/DL — SIGNIFICANT CHANGE UP (ref 1.6–2.6)
MCHC RBC-ENTMCNC: 31.2 PG — SIGNIFICANT CHANGE UP (ref 27–34)
MCHC RBC-ENTMCNC: 31.7 PG — SIGNIFICANT CHANGE UP (ref 27–34)
MCHC RBC-ENTMCNC: 32.6 GM/DL — SIGNIFICANT CHANGE UP (ref 32–36)
MCHC RBC-ENTMCNC: 32.7 GM/DL — SIGNIFICANT CHANGE UP (ref 32–36)
MCV RBC AUTO: 95.7 FL — SIGNIFICANT CHANGE UP (ref 80–100)
MCV RBC AUTO: 97 FL — SIGNIFICANT CHANGE UP (ref 80–100)
MONOCYTES # BLD AUTO: 0.75 K/UL — SIGNIFICANT CHANGE UP (ref 0–0.9)
MONOCYTES NFR BLD AUTO: 7.2 % — SIGNIFICANT CHANGE UP (ref 2–14)
NEUTROPHILS # BLD AUTO: 7.48 K/UL — HIGH (ref 1.8–7.4)
NEUTROPHILS NFR BLD AUTO: 71.4 % — SIGNIFICANT CHANGE UP (ref 43–77)
NRBC # BLD: 0 /100 WBCS — SIGNIFICANT CHANGE UP (ref 0–0)
NRBC # BLD: 0 /100 WBCS — SIGNIFICANT CHANGE UP (ref 0–0)
PHOSPHATE SERPL-MCNC: 3.2 MG/DL — SIGNIFICANT CHANGE UP (ref 2.5–4.5)
PHOSPHATE SERPL-MCNC: 3.2 MG/DL — SIGNIFICANT CHANGE UP (ref 2.5–4.5)
PLATELET # BLD AUTO: 309 K/UL — SIGNIFICANT CHANGE UP (ref 150–400)
PLATELET # BLD AUTO: 317 K/UL — SIGNIFICANT CHANGE UP (ref 150–400)
POTASSIUM SERPL-MCNC: 3.6 MMOL/L — SIGNIFICANT CHANGE UP (ref 3.5–5.3)
POTASSIUM SERPL-MCNC: 3.9 MMOL/L — SIGNIFICANT CHANGE UP (ref 3.5–5.3)
POTASSIUM SERPL-SCNC: 3.6 MMOL/L — SIGNIFICANT CHANGE UP (ref 3.5–5.3)
POTASSIUM SERPL-SCNC: 3.9 MMOL/L — SIGNIFICANT CHANGE UP (ref 3.5–5.3)
PROT SERPL-MCNC: 5.8 G/DL — LOW (ref 6–8.3)
PROT SERPL-MCNC: 6.1 G/DL — SIGNIFICANT CHANGE UP (ref 6–8.3)
PROTHROM AB SERPL-ACNC: 11.1 SEC — SIGNIFICANT CHANGE UP (ref 9.5–13)
RBC # BLD: 2.53 M/UL — LOW (ref 3.8–5.2)
RBC # BLD: 2.53 M/UL — LOW (ref 3.8–5.2)
RBC # BLD: 2.68 M/UL — LOW (ref 3.8–5.2)
RBC # FLD: 18.2 % — HIGH (ref 10.3–14.5)
RBC # FLD: 18.9 % — HIGH (ref 10.3–14.5)
RETICS #: 110.8 K/UL — SIGNIFICANT CHANGE UP (ref 25–125)
RETICS/RBC NFR: 4.3 % — HIGH (ref 0.5–2.5)
SODIUM SERPL-SCNC: 135 MMOL/L — SIGNIFICANT CHANGE UP (ref 135–145)
SODIUM SERPL-SCNC: 137 MMOL/L — SIGNIFICANT CHANGE UP (ref 135–145)
TIBC SERPL-MCNC: 258 UG/DL — SIGNIFICANT CHANGE UP (ref 220–430)
TRANSFERRIN SERPL-MCNC: 205 MG/DL — SIGNIFICANT CHANGE UP (ref 200–360)
UIBC SERPL-MCNC: 132 UG/DL — SIGNIFICANT CHANGE UP (ref 110–370)
VIT B12 SERPL-MCNC: 626 PG/ML — SIGNIFICANT CHANGE UP (ref 232–1245)
WBC # BLD: 10.04 K/UL — SIGNIFICANT CHANGE UP (ref 3.8–10.5)
WBC # BLD: 10.46 K/UL — SIGNIFICANT CHANGE UP (ref 3.8–10.5)
WBC # FLD AUTO: 10.04 K/UL — SIGNIFICANT CHANGE UP (ref 3.8–10.5)
WBC # FLD AUTO: 10.46 K/UL — SIGNIFICANT CHANGE UP (ref 3.8–10.5)

## 2024-03-04 PROCEDURE — 74174 CTA ABD&PLVS W/CONTRAST: CPT | Mod: 26,MC

## 2024-03-04 PROCEDURE — 99223 1ST HOSP IP/OBS HIGH 75: CPT | Mod: GC,AI

## 2024-03-04 PROCEDURE — 99222 1ST HOSP IP/OBS MODERATE 55: CPT | Mod: GC

## 2024-03-04 RX ORDER — PANTOPRAZOLE SODIUM 20 MG/1
40 TABLET, DELAYED RELEASE ORAL
Refills: 0 | Status: DISCONTINUED | OUTPATIENT
Start: 2024-03-04 | End: 2024-03-04

## 2024-03-04 RX ORDER — ACETAMINOPHEN 500 MG
650 TABLET ORAL EVERY 6 HOURS
Refills: 0 | Status: DISCONTINUED | OUTPATIENT
Start: 2024-03-04 | End: 2024-03-04

## 2024-03-04 RX ORDER — ENOXAPARIN SODIUM 100 MG/ML
40 INJECTION SUBCUTANEOUS EVERY 24 HOURS
Refills: 0 | Status: DISCONTINUED | OUTPATIENT
Start: 2024-03-04 | End: 2024-03-04

## 2024-03-04 RX ORDER — PANTOPRAZOLE SODIUM 20 MG/1
40 TABLET, DELAYED RELEASE ORAL EVERY 12 HOURS
Refills: 0 | Status: DISCONTINUED | OUTPATIENT
Start: 2024-03-04 | End: 2024-03-04

## 2024-03-04 RX ORDER — MORPHINE SULFATE 50 MG/1
2 CAPSULE, EXTENDED RELEASE ORAL ONCE
Refills: 0 | Status: DISCONTINUED | OUTPATIENT
Start: 2024-03-04 | End: 2024-03-04

## 2024-03-04 RX ORDER — LANOLIN ALCOHOL/MO/W.PET/CERES
3 CREAM (GRAM) TOPICAL AT BEDTIME
Refills: 0 | Status: DISCONTINUED | OUTPATIENT
Start: 2024-03-04 | End: 2024-03-05

## 2024-03-04 RX ORDER — ACETAMINOPHEN 500 MG
975 TABLET ORAL EVERY 6 HOURS
Refills: 0 | Status: DISCONTINUED | OUTPATIENT
Start: 2024-03-04 | End: 2024-03-05

## 2024-03-04 RX ORDER — TRAMADOL HYDROCHLORIDE 50 MG/1
50 TABLET ORAL EVERY 6 HOURS
Refills: 0 | Status: DISCONTINUED | OUTPATIENT
Start: 2024-03-04 | End: 2024-03-04

## 2024-03-04 RX ORDER — ONDANSETRON 8 MG/1
4 TABLET, FILM COATED ORAL EVERY 8 HOURS
Refills: 0 | Status: DISCONTINUED | OUTPATIENT
Start: 2024-03-04 | End: 2024-03-05

## 2024-03-04 RX ORDER — POLYETHYLENE GLYCOL 3350 17 G/17G
17 POWDER, FOR SOLUTION ORAL
Qty: 0 | Refills: 0 | DISCHARGE
Start: 2024-03-04

## 2024-03-04 RX ORDER — SENNA PLUS 8.6 MG/1
2 TABLET ORAL AT BEDTIME
Refills: 0 | Status: DISCONTINUED | OUTPATIENT
Start: 2024-03-04 | End: 2024-03-05

## 2024-03-04 RX ORDER — OXYCODONE HYDROCHLORIDE 5 MG/1
2.5 TABLET ORAL EVERY 6 HOURS
Refills: 0 | Status: DISCONTINUED | OUTPATIENT
Start: 2024-03-04 | End: 2024-03-05

## 2024-03-04 RX ORDER — PANTOPRAZOLE SODIUM 20 MG/1
40 TABLET, DELAYED RELEASE ORAL
Refills: 0 | Status: DISCONTINUED | OUTPATIENT
Start: 2024-03-04 | End: 2024-03-05

## 2024-03-04 RX ORDER — POLYETHYLENE GLYCOL 3350 17 G/17G
17 POWDER, FOR SOLUTION ORAL EVERY 12 HOURS
Refills: 0 | Status: DISCONTINUED | OUTPATIENT
Start: 2024-03-04 | End: 2024-03-05

## 2024-03-04 RX ORDER — MORPHINE SULFATE 50 MG/1
15 CAPSULE, EXTENDED RELEASE ORAL EVERY 6 HOURS
Refills: 0 | Status: DISCONTINUED | OUTPATIENT
Start: 2024-03-04 | End: 2024-03-04

## 2024-03-04 RX ORDER — LIDOCAINE 4 G/100G
1 CREAM TOPICAL EVERY 24 HOURS
Refills: 0 | Status: DISCONTINUED | OUTPATIENT
Start: 2024-03-04 | End: 2024-03-05

## 2024-03-04 RX ORDER — SENNA PLUS 8.6 MG/1
2 TABLET ORAL
Qty: 0 | Refills: 0 | DISCHARGE
Start: 2024-03-04

## 2024-03-04 RX ADMIN — MORPHINE SULFATE 2 MILLIGRAM(S): 50 CAPSULE, EXTENDED RELEASE ORAL at 04:00

## 2024-03-04 RX ADMIN — PANTOPRAZOLE SODIUM 40 MILLIGRAM(S): 20 TABLET, DELAYED RELEASE ORAL at 18:22

## 2024-03-04 RX ADMIN — OXYCODONE HYDROCHLORIDE 2.5 MILLIGRAM(S): 5 TABLET ORAL at 15:11

## 2024-03-04 RX ADMIN — MORPHINE SULFATE 15 MILLIGRAM(S): 50 CAPSULE, EXTENDED RELEASE ORAL at 08:57

## 2024-03-04 RX ADMIN — Medication 975 MILLIGRAM(S): at 13:30

## 2024-03-04 RX ADMIN — TRAMADOL HYDROCHLORIDE 50 MILLIGRAM(S): 50 TABLET ORAL at 08:15

## 2024-03-04 RX ADMIN — POLYETHYLENE GLYCOL 3350 17 GRAM(S): 17 POWDER, FOR SOLUTION ORAL at 18:22

## 2024-03-04 RX ADMIN — TRAMADOL HYDROCHLORIDE 50 MILLIGRAM(S): 50 TABLET ORAL at 07:15

## 2024-03-04 RX ADMIN — Medication 975 MILLIGRAM(S): at 18:22

## 2024-03-04 RX ADMIN — MORPHINE SULFATE 15 MILLIGRAM(S): 50 CAPSULE, EXTENDED RELEASE ORAL at 09:57

## 2024-03-04 RX ADMIN — OXYCODONE HYDROCHLORIDE 2.5 MILLIGRAM(S): 5 TABLET ORAL at 16:11

## 2024-03-04 RX ADMIN — Medication 975 MILLIGRAM(S): at 19:22

## 2024-03-04 RX ADMIN — Medication 975 MILLIGRAM(S): at 12:30

## 2024-03-04 NOTE — CONSULT NOTE ADULT - ATTENDING COMMENTS
Patient seen, examined, and discussed with Dr. Nieto. Agree with above. 89F with a h/o Breast Cx s/p R mastectomy, urinary retention, memory loss - p/w 1wk mid and lower back pain, found w/ thoracolumbar compression fractures likely iso prior falls, & acute anemia w/ unclear etiology, s/p 1u pRBC. GI consulted for anemia evaluation. Agree that there has been a relative drastic drop since January. No obvious signs of bleeding at this time. Discussed role of bidirectional endoscopy, but patient wants to follow up outpatient instead. Continue PPI and will follow up outpatient. Will sign off, please call back with questions or concerns.     Nita Richardson MD  Gastroenterology

## 2024-03-04 NOTE — DISCHARGE NOTE PROVIDER - CARE PROVIDER_API CALL
Deven Stevens Petar  Orthopaedic Surgery  [address]  [place] [address]  Phone: [phone]  Fax: [phone]  Scheduled Appointment: 03/07/2024

## 2024-03-04 NOTE — DISCHARGE NOTE PROVIDER - NSDCMRMEDTOKEN_GEN_ALL_CORE_FT
acetaminophen 325 mg oral tablet: 2 tab(s) orally every 6 hours, As Needed for pain   cholecalciferol oral tablet: 400 unit(s) orally once a day  escitalopram 5 mg oral tablet: 1 tab(s) orally once a day  lidocaine 4% topical film: Apply topically to affected area once a day  melatonin 3 mg oral tablet: 1 tab(s) orally once a day (at bedtime) As needed Insomnia  polyethylene glycol 3350 oral powder for reconstitution: 17 gram(s) orally every 12 hours  senna leaf extract oral tablet: 2 tab(s) orally once a day (at bedtime)

## 2024-03-04 NOTE — H&P ADULT - PROBLEM SELECTOR PLAN 1
P/w Hb 6.7 (bl ~11-12) Hcrit 18.7 now s/p 1u pRBC. . Plt 344. Acute drop from Hb 11-12, last Hb 11.8 on 1/27/24. BUN elevated to 43 w/o elevation in Cr, possible ?GI Bleed.   No obvious s/sx bleeding. P/w severe back pain, however CTAP Angio w/o active hemorrhage: RP bleed or intraabdominal bleeding r/o'd.   Last C-scope 15yrs ago, normal findings. No hx endoscopy but no known hx gastric ulcers. Denies any recent melena/hematochezia or BRBPR. MAURICIO in ED neg. No n/v or hematemesis. Pt appears to be reliable historian   Very unclear etiology re: acute drop in Hb.     - GI Consult given significant acute drop in Hb  - f/u post-transfusion CBC  - maintain active T&S, transfuse Hb>7 P/w Hb 6.7 (bl ~11-12) Hcrit 18.7 now s/p 1u pRBC. . Plt 344. Acute drop from Hb 11-12, last Hb 11.8 on 1/27/24. BUN elevated to 43 w/o elevation in Cr, possible ?GI Bleed.   No obvious s/sx bleeding. P/w severe back pain, however CTAP Angio w/o active hemorrhage: RP bleed or intraabdominal bleeding r/o'd. No e/o hemolysis, TBili wnl.   Last C-scope 15yrs ago, normal findings. No hx endoscopy, no known hx gastric ulcers. Denies any recent melena/hematochezia or BRBPR. No excessive NSAID use. MAURICIO in ED neg. No n/v or hematemesis. Pt appears to be reliable historian   Very unclear etiology re: acute drop in Hb.     PLAN:   - GI Consult given significant acute drop in Hb  - Start protonix 40mg IVP q12h  - f/u post-transfusion CBC  - f/u add-on LDH, reticulocyte count, haptoglobin  - f/u iron panel, B12, folate, + coags (could not add on to pre-transfusion labs)  - maintain active T&S, transfuse Hb>7. Presenting w/ back pain since last week, severe dull 10/10 ache. Saw Dr. Stevens (ortho) outpatient, received injections into spine w/ some minimal pain relief.  Was planned for MRI on 3/7 w/ Dr Stevens  CT Thoracic and Lumbar Spine: Since 4/19/2022, increased depression along the superior and inferior endplate of L2 now w/ moderate compression deformity, previously mild compression deformity. Cannot exclude acute on chronic compression deformity. Chronic severe compression deformities again noted at T12 and L1 with   mild to moderate chronic compression deformity at L3 and L4, all unchanged. Multilevel thoracic compression deformities with severe deformity at T3 and T12, moderate deformity at T7 and mild deformity at T9. At T9 there is moderate canal stenosis secondary to disc bulge and mild bony retropulsion.    Compression fractures likely iso hx multiple falls     PLAN:   - consult ortho-spine, f/u recs  - f/u MRI Thoracic lumbar   - Pain mgmt w/ Acetaminophen 650 q6h standing, Tramadol 50mg q6h for mod pain, PO Morphine 15mg q6h for severe pain  - Fall precautions  - Consider PT consult if appropriate, if able to ambulate.

## 2024-03-04 NOTE — DISCHARGE NOTE PROVIDER - NSDCFUSCHEDAPPT_GEN_ALL_CORE_FT
Deven Stevens  [facility] PreAdmits  Scheduled Appointment: 03/07/2024    [facility]  MRI  [address]  Scheduled Appointment: 03/07/2024    [facility]  MRI  [address]  Scheduled Appointment: 03/07/2024    Deven Stevens  [facility]  ORTHOSURG [address]  Scheduled Appointment: 03/12/2024

## 2024-03-04 NOTE — DISCHARGE NOTE PROVIDER - NSDCCPCAREPLAN_GEN_ALL_CORE_FT
PRINCIPAL DISCHARGE DIAGNOSIS  Diagnosis: Lumbar compression fracture  Assessment and Plan of Treatment: Vertebral Compression Fracture  AMBULATORY CARE:  A vertebral compression fracture (VCF) is a collapse or breakdown in a bone in your spine. Compression fractures happen when there is too much pressure on the vertebra. VCFs most often occur in the thoracic (middle) and lumbar (lower) areas of your spine. Fractures may be mild to severe.     Signs and symptoms: You may not have any signs or symptoms with a mild VCF. You may have any of the following with a more severe fracture:  •Sudden, severe, and sharp back pain  •Back pain that gets worse when you stand or walk  •Muscle spasms in your back  •Problems urinating or having bowel movements  •Sudden weakness in your arms or legs  Call your local emergency number (911 in the [place]) if:   •You feel lightheaded, short of breath, and have chest pain.  •You cough up blood.  •You suddenly cannot feel your legs.  •You suddenly have trouble moving your arms or legs.  Seek care immediately if:   •Your arm or leg feels warm, tender, and painful. It may look swollen and red.  •You have new problems urinating or having bowel movements.  •You have severe pain in your back after falling, bending forward, sneezing, or coughing strongly.  Call your doctor or orthopedist if:   •You cannot sleep or rest because of back pain.  •You have pain or swelling in your back that is getting worse, or does not go away.  •You have questions or concerns about your condition or care.  Treatment may include any of the following, depending on how severe the fracture is:   •Bed rest may be needed for a mild fracture.  •A back brace may be needed for 8 to 12 weeks. A brace may decrease your pain, and help your vertebrae heal.  •A cane or walker can help you keep your balance when you walk. This helps prevent a fall that could cause more injury.  •Medicines may be given for pain. Bisphosphonates and calcitonin are medicines to help your bones get stronger. They can decrease the pain of a VCF caused by osteoporosis, and decrease your risk for another fracture.  •Physical or occup      SECONDARY DISCHARGE DIAGNOSES  Diagnosis: Severe anemia  Assessment and Plan of Treatment:

## 2024-03-04 NOTE — H&P ADULT - PROBLEM SELECTOR PLAN 2
Presenting w/ back pain since last week, severe dull 10/10 ache. Saw Dr. Stevens (ortho) outpatient, received injections into spine w/ some minimal pain relief.  Was planned for MRI on 3/7 w/ Dr Stevens  CT Thoracic and Lumbar Spine: Since 4/19/2022, increased depression along the superior and inferior endplate of L2 now w/ moderate compression deformity, previously mild compression deformity. Cannot exclude acute on chronic compression deformity. Chronic severe compression deformities again noted at T12 and L1 with   mild to moderate chronic compression deformity at L3 and L4, all unchanged. Multilevel thoracic compression deformities with severe deformity at T3 and T12, moderate deformity at T7 and mild deformity at T9. At T9 there is moderate canal stenosis secondary to disc bulge and mild bony retropulsion.  - Ortho-Spine consult in AM   - Pain mgmt w/ Acetaminophen 650 q6h standing, Tramadol 50mg q6h for mod pain, PO Morphine 2mg q6h for severe pain Presenting w/ back pain since last week, severe dull 10/10 ache. Saw Dr. Stevens (ortho) outpatient, received injections into spine w/ some minimal pain relief.  Was planned for MRI on 3/7 w/ Dr Stevens  CT Thoracic and Lumbar Spine: Since 4/19/2022, increased depression along the superior and inferior endplate of L2 now w/ moderate compression deformity, previously mild compression deformity. Cannot exclude acute on chronic compression deformity. Chronic severe compression deformities again noted at T12 and L1 with   mild to moderate chronic compression deformity at L3 and L4, all unchanged. Multilevel thoracic compression deformities with severe deformity at T3 and T12, moderate deformity at T7 and mild deformity at T9. At T9 there is moderate canal stenosis secondary to disc bulge and mild bony retropulsion.    Compression fractures likely iso hx multiple falls     PLAN:   - Ortho-Spine consulted, f/u recs  - f/u MRI Thoracic lumbar   - Pain mgmt w/ Acetaminophen 650 q6h standing, Tramadol 50mg q6h for mod pain, PO Morphine 15mg q6h for severe pain  - Fall precautions  - Consider PT consult if appropriate, if able to ambulate. Presenting w/ back pain since last week, severe dull 10/10 ache. Saw Dr. Stevens (ortho) outpatient, received injections into spine w/ some minimal pain relief.  Was planned for MRI on 3/7 w/ Dr Stevens  CT Thoracic and Lumbar Spine: Since 4/19/2022, increased depression along the superior and inferior endplate of L2 now w/ moderate compression deformity, previously mild compression deformity. Cannot exclude acute on chronic compression deformity. Chronic severe compression deformities again noted at T12 and L1 with   mild to moderate chronic compression deformity at L3 and L4, all unchanged. Multilevel thoracic compression deformities with severe deformity at T3 and T12, moderate deformity at T7 and mild deformity at T9. At T9 there is moderate canal stenosis secondary to disc bulge and mild bony retropulsion.    Compression fractures likely iso hx multiple falls     PLAN:   - consult ortho-spine, f/u recs  - f/u MRI Thoracic lumbar   - Pain mgmt w/ Acetaminophen 650 q6h standing, Tramadol 50mg q6h for mod pain, PO Morphine 15mg q6h for severe pain  - Fall precautions  - Consider PT consult if appropriate, if able to ambulate. Suspect 2/2 UGIB - in setting of NSAID use  P/w Hb 6.7 (bl ~11-12) Hcrit 18.7 now s/p 1u pRBC. . Plt 344. Acute drop from Hb 11-12, last Hb 11.8 on 1/27/24. BUN elevated to 43 w/o elevation in Cr, possible ?GI Bleed.   No obvious s/sx bleeding. P/w severe back pain, however CTAP Angio w/o active hemorrhage: RP bleed or intraabdominal bleeding r/o'd. No e/o hemolysis, TBili wnl.   Last C-scope 15yrs ago, normal findings. No hx endoscopy, no known hx gastric ulcers. Denies any recent melena/hematochezia or BRBPR. No excessive NSAID use. MAURICIO in ED neg. No n/v or hematemesis. Pt appears to be reliable historian   Very unclear etiology re: acute drop in Hb.     PLAN:   - GI Consult given significant acute drop in Hb  - Start protonix 40mg IVP q12h  - f/u post-transfusion CBC  - f/u add-on LDH, reticulocyte count, haptoglobin  - f/u iron panel, B12, folate, + coags (could not add on to pre-transfusion labs)  - maintain active T&S, transfuse Hb>7.

## 2024-03-04 NOTE — H&P ADULT - NSHPPHYSICALEXAM_GEN_ALL_CORE
.  VITAL SIGNS:  T(C): 36.9 (03-04-24 @ 04:05), Max: 36.9 (03-03-24 @ 22:11)  T(F): 98.4 (03-04-24 @ 04:05), Max: 98.5 (03-03-24 @ 22:11)  HR: 85 (03-04-24 @ 04:05) (65 - 92)  BP: 135/73 (03-04-24 @ 04:05) (117/53 - 149/72)  BP(mean): --  RR: 16 (03-04-24 @ 04:05) (16 - 18)  SpO2: 98% (03-04-24 @ 04:05) (98% - 100%)  Wt(kg): --    PHYSICAL EXAM:    Constitutional: NAD, lying still in bed   HEENT: NC/AT, PERRL/EOMI, anicteric sclera, dry MM   Respiratory: CTA B/L; no audible wheezing/ronchi/rales  Cardiac: +S1/S2; RRR; no M/R/G; PMI non-displaced  Gastrointestinal: soft, mildly distended, NT even to deep palpation  Back: Very ttp over mid-spine to lumbosacral spine, no obvious bruising/step-offs or other findings.   Extremities: WWP, no clubbing or cyanosis; no peripheral edema  Vascular: 2+ radial & DP pulses  Dermatologic: skin warm, dry and intact; no rashes, wounds, or scars  Neurologic: AAOx3; CNII-XII grossly intact; no focal deficits  Psychiatric: affect and characteristics of appearance, verbalizations, behaviors are appropriate .  VITAL SIGNS:  T(C): 36.9 (03-04-24 @ 04:05), Max: 36.9 (03-03-24 @ 22:11)  T(F): 98.4 (03-04-24 @ 04:05), Max: 98.5 (03-03-24 @ 22:11)  HR: 85 (03-04-24 @ 04:05) (65 - 92)  BP: 135/73 (03-04-24 @ 04:05) (117/53 - 149/72)  BP(mean): --  RR: 16 (03-04-24 @ 04:05) (16 - 18)  SpO2: 98% (03-04-24 @ 04:05) (98% - 100%)  Wt(kg): --    PHYSICAL EXAM:  Constitutional: NAD, lying still in bed   HEENT: NC/AT, PERRL/EOMI, anicteric sclera, dry MM   Respiratory: CTA B/L; no audible wheezing/ronchi/rales  Cardiac: +S1/S2; RRR; no M/R/G; PMI non-displaced  Gastrointestinal: soft, mildly distended, NT even to deep palpation  Back: Very ttp over mid-spine to lumbosacral spine, no obvious bruising/step-offs or other findings.   Extremities: WWP, no clubbing or cyanosis; no peripheral edema  Vascular: 2+ radial & DP pulses  Dermatologic: skin warm, dry and intact; no rashes, wounds, or scars  Neurologic: AAOx3; CNII-XII grossly intact; no focal deficits  Psychiatric: affect and characteristics of appearance, verbalizations, behaviors are appropriate

## 2024-03-04 NOTE — PROGRESS NOTE ADULT - PROBLEM SELECTOR PLAN 2
Presenting w/ back pain since last week, severe dull 10/10 ache. Saw Dr. Stevens (ortho) outpatient, received injections into spine w/ some minimal pain relief.  Was planned for MRI on 3/7 w/ Dr Stevens  CT Thoracic and Lumbar Spine: Since 4/19/2022, increased depression along the superior and inferior endplate of L2 now w/ moderate compression deformity, previously mild compression deformity. Cannot exclude acute on chronic compression deformity. Chronic severe compression deformities again noted at T12 and L1 with   mild to moderate chronic compression deformity at L3 and L4, all unchanged. Multilevel thoracic compression deformities with severe deformity at T3 and T12, moderate deformity at T7 and mild deformity at T9. At T9 there is moderate canal stenosis secondary to disc bulge and mild bony retropulsion.    Compression fractures likely iso hx multiple falls     PLAN:   - ortho consulted, appreciated recs   - f/u MRI Thoracic lumbar   - Will continue multimodal pain control with Tylenol 975mg, ice packs, lidocaine patches, and Oxycodone for breakthrough  - Fall precautions  - PT consult Presenting w/ back pain since last week, severe dull 10/10 ache. Saw Dr. Stevens (ortho) outpatient, received injections into spine w/ some minimal pain relief.  Was planned for MRI on 3/7 w/ Dr Stevens  CT Thoracic and Lumbar Spine: Since 4/19/2022, increased depression along the superior and inferior endplate of L2 now w/ moderate compression deformity, previously mild compression deformity. Cannot exclude acute on chronic compression deformity. Chronic severe compression deformities again noted at T12 and L1 with   mild to moderate chronic compression deformity at L3 and L4, all unchanged. Multilevel thoracic compression deformities with severe deformity at T3 and T12, moderate deformity at T7 and mild deformity at T9. At T9 there is moderate canal stenosis secondary to disc bulge and mild bony retropulsion.    Compression fractures likely iso hx multiple falls   Ortho: wbat for PT  PLAN:   - f/u ortho recs  - f/u MRI Thoracic/ Lumbar   - Will continue multimodal pain control with Tylenol 975mg, ice packs, lidocaine patches, and Oxycodone 2.5mg PRN   - Fall precautions  - PT consult/ OT consult

## 2024-03-04 NOTE — CONSULT NOTE ADULT - SUBJECTIVE AND OBJECTIVE BOX
GASTROENTEROLOGY CONSULT NOTE  HPI:  89yo F w/ PMHx Breast Cx s/p R mastectomy, urinary retention, memory loss - p/w 1wk mid and lower back pain. Per pt's aid, pt has been intermittently screaming in pain for last 1 d, and was not able to sleep.     Was recently admitted in Jan after a fall, was found to have the flu, and discharged to [facility]. Recovered well in [facility] and discharged to home w/ HHA help.  Denies falling again since discharge from [facility], has been ambulating w/ walker and wheelchair even at home, with help of her aides.  However last wk ~2/27, developed severe mid & lower back pain, nonradiating. Saw Dr. Stevens outpatient  and received 16 injections into her back for pain. Had some relief but pain continued, and tylenol and tramadol at home last dose 3/3  ~1PM - w/ only mild relief. Has not taken ibuprofen or any NSAIDs.     Denies any recent melena/hematochezia/or BRBPR. No n/v or hematemesis. Last C-scope ~15yrs ago, normal findings. Never had an endoscopy. No family hx colon cancer. Hx breast cancer s/p mastectomy ~15yrs ago, no recurrence, has regular follow-up. Has never had anemia in the past, no hx transfusions.     ED COURSE  Vitals: T: 97.8, HR: 88, BP: 119/73, RR: 17, SpO2: 100% on RA  Labs: WBC: 12.57, Neutrophil: 84.2% RBC: 1.99, Hb: 6.7 --> 6.3, Hct: 20.5 --> 18.7, MCV: 103,   EKG: NSR with PVC and PAC   Imaging:  -CT Thoracic and Lumbar Spine: Since 4/19/2022, increased depression along the superior and inferior endplate of L2 now demonstrating a moderate compression deformity, previously mild compression deformity. Cannot exclude acute on chronic compression deformity. Chronic severe compression deformities again noted at T12 and L1 with   mild to moderate chronic compression deformity at L3 and L4, all unchanged. Multilevel thoracic compression deformities with severe deformity at T3 and T12, moderate deformity at T7 and mild deformity at T9. At T9 there is moderate canal stenosis secondary to disc bulge and mild bony retropulsion.  -CT Angio Abdomen and Pelvis: No active hemorrhage. Increased compression deformity L2, see concurrent CT thoracolumbar spine.  Interventions: Acetaminophen 975mg x1, Morphine 2mg IVP x2, Tramadol 50mg x1, ordered 1u pRBC      GI was consulted for anemia.  Anemia studies consistent with macrocytic anemia, normal iron studes  Patient denies any GI symptoms: n/v/poor appetite/ reflux/ dysphagia/ wt loss/ melena.  Takes occasional NSAIDS  Per team, MAURICIO negative    Allergies    No Known Allergies    Intolerances      Home Medications:  acetaminophen 325 mg oral tablet: 2 tab(s) orally every 6 hours, As Needed for pain  (24 Jan 2024 15:11)  benzonatate 100 mg oral capsule: 1 cap(s) orally every 8 hours As needed Cough (27 Jan 2024 08:48)  cholecalciferol oral tablet: 400 unit(s) orally once a day (24 Jan 2024 15:11)  escitalopram 5 mg oral tablet: 1 tab(s) orally once a day (27 Jan 2024 10:45)  ipratropium-albuterol 0.5 mg-2.5 mg/3 mL inhalation solution: 3 milliliter(s) inhaled every 6 hours (27 Jan 2024 08:48)  lidocaine 4% topical film: Apply topically to affected area once a day (27 Jan 2024 08:48)  melatonin 3 mg oral tablet: 1 tab(s) orally once a day (at bedtime) As needed Insomnia (27 Jan 2024 08:48)  oseltamivir 30 mg oral capsule: 1 cap(s) orally every 12 hours (27 Jan 2024 08:48)    MEDICATIONS:  MEDICATIONS  (STANDING):  acetaminophen     Tablet .. 975 milliGRAM(s) Oral every 6 hours  lidocaine   4% Patch 1 Patch Transdermal every 24 hours  pantoprazole  Injectable 40 milliGRAM(s) IV Push two times a day  polyethylene glycol 3350 17 Gram(s) Oral every 12 hours  senna 2 Tablet(s) Oral at bedtime    MEDICATIONS  (PRN):  aluminum hydroxide/magnesium hydroxide/simethicone Suspension 30 milliLiter(s) Oral every 4 hours PRN Dyspepsia  melatonin 3 milliGRAM(s) Oral at bedtime PRN Insomnia  ondansetron Injectable 4 milliGRAM(s) IV Push every 8 hours PRN Nausea and/or Vomiting  oxyCODONE    IR 2.5 milliGRAM(s) Oral every 6 hours PRN Severe Pain (7 - 10)    PAST MEDICAL & SURGICAL HISTORY:  Breast CA      Multiple falls      Back pain      Urinary retention      Osteoporosis      Macrocytosis        FAMILY HISTORY:    SOCIAL HISTORY:  Tobacco: [ ] Current, [ ] Former, [ ] Never; Pack Years:  Alcohol:  Illicit Drugs:    REVIEW OF SYSTEMS:  CONSTITUTIONAL: No weakness, fevers or chills  HEENT: No visual changes; No vertigo or throat pain   NECK: No pain or stiffness  RESPIRATORY: No cough, wheezing, hemoptysis; No shortness of breath  CARDIOVASCULAR: No chest pain or palpitations  GASTROINTESTINAL: As above.  GENITOURINARY: No dysuria, frequency or hematuria  NEUROLOGICAL: No numbness or weakness  SKIN: No itching, burning, rashes, or lesions   All other 10 review of systems is negative unless indicated above.    Vital Signs Last 24 Hrs  T(C): 36.6 (04 Mar 2024 08:45), Max: 36.9 (03 Mar 2024 22:11)  T(F): 97.8 (04 Mar 2024 08:45), Max: 98.5 (03 Mar 2024 22:11)  HR: 79 (04 Mar 2024 08:45) (65 - 92)  BP: 129/65 (04 Mar 2024 08:45) (117/53 - 149/72)  BP(mean): --  RR: 18 (04 Mar 2024 08:45) (16 - 18)  SpO2: 95% (04 Mar 2024 08:45) (95% - 100%)    Parameters below as of 04 Mar 2024 08:45  Patient On (Oxygen Delivery Method): room air          PHYSICAL EXAM:    General: in no acute distress  Eyes: Anicteric sclerae, moist conjunctivae  HENT: Moist mucous membranes  Neck: Trachea midline, supple  Lungs: Normal respiratory effort, no intercostal retractions  Cardiovascular: RRR  Abdomen: Soft, non-tender non-distended; No rebound or guarding  Extremities: Normal range of motion, No clubbing, cyanosis or edema  Neurological: Alert and oriented x3  Skin: Warm and dry. No obvious rash    LABS:                        7.9    10.04 )-----------( 317      ( 04 Mar 2024 11:22 )             24.2     03-04    137  |  100  |  36<H>  ----------------------------<  133<H>  3.9   |  32<H>  |  0.62    Ca    8.6      04 Mar 2024 11:22  Phos  3.2     03-04  Mg     2.3     03-04    TPro  6.1  /  Alb  3.4  /  TBili  0.5  /  DBili  x   /  AST  14  /  ALT  10  /  AlkPhos  66  03-04        PT/INR - ( 04 Mar 2024 11:22 )   PT: 11.1 sec;   INR: 0.97          PTT - ( 04 Mar 2024 11:22 )  PTT:28.0 sec    RADIOLOGY & ADDITIONAL STUDIES:     Reviewed

## 2024-03-04 NOTE — H&P ADULT - ATTENDING COMMENTS
Patient was seen and examined at bedside on 3/4/2024 at 130 pm. Patient reports pain at her back with any movement. Feels well. Reports continued SOB. Denies CP, N/V. ROS is otherwise negative. Vitals, labwork, pertinent imaging reviewed. Exam - NAD, AAO x 4, PERRLA, EOMI, MMM, supple neck, chest - CTA b/l, CV - rrr, s1s2, no m/r/g, abd - soft, NTND, + BS, ext - wwp  psych - normal affect    Plan:  -Pain control - pt refusing Lidocaine patch, c/w standing Tylenol ice packs, limit narcotics  -Ortho rec MRI  -Aggressive bowel regimen  -PVR  -Nutrition consult  -PT/OT  -GI consulted but patient refusing EGD

## 2024-03-04 NOTE — DISCHARGE NOTE PROVIDER - HOSPITAL COURSE
#AMA   Was called by RN due to patient wanting to sign out AMA. Asked patient why she wanted to leave, reports she wants to go home and does not want any medical interventions being offered.     Patient is AAO x 3. I explained at length to the patient the risks of signing out AMA including but not limited to harm, injury or death. I explained the risks, benefits and alternatives to treatment as well as the attendant risks of refusing treatment at this time. I offered to answer any questions and fully answered any such questions. We believe that the patient fully understands what has been explained and answered. I informed hospitalist Dr. Laguerre of this, aware. Patient signed form to sign out AMA and accepts responsibility for any and all results of this decision.    #Discharge: do not delete    89yo F w/ PMHx Breast Cx s/p R mastectomy, urinary retention, memory loss - p/w 1wk mid and lower back pain, found w/ thoracolumbar compression fractures likely iso prior falls, & acute anemia w/ unclear etiology, s/p 1u pRBC. Admitted for further mgmt.         # Anemia.   ·  Plan: P/w Hb 6.7 (bl ~11-12) Hcrit 18.7 now s/p 1u pRBC. . Plt 344. Acute drop from Hb 11-12, last Hb 11.8 on 1/27/24. BUN elevated to 43 w/o elevation in Cr, possible GI Bleed in setting of Alleve use.   No obvious s/sx bleeding. P/w severe back pain, however CTAP Angio w/o active hemorrhage: RP bleed or intraabdominal bleeding r/o'd. No e/o hemolysis, TBili wnl.   Last C-scope 15yrs ago, normal findings. No hx endoscopy, no known hx gastric ulcers. Denies any recent melena/hematochezia or BRBPR. No excessive NSAID use. MAURICIO in ED neg. No n/v or hematemesis. Pt appears to be reliable historian   Very unclear etiology re: acute drop in Hb.   s/p 1uprbc  started protonix 40mg IVP 12h  Pt does not want to pursue scope with GI to investigate cause of bleeding     #Back pain.   Presenting w/ back pain since last week, severe dull 10/10 ache. Saw Dr. Stevens (ortho) outpatient, received injections into spine w/ some minimal pain relief.  Was planned for MRI on 3/7 w/ Dr Stevens  CT Thoracic and Lumbar Spine: Since 4/19/2022, increased depression along the superior and inferior endplate of L2 now w/ moderate compression deformity, previously mild compression deformity. Cannot exclude acute on chronic compression deformity. Chronic severe compression deformities again noted at T12 and L1 with   mild to moderate chronic compression deformity at L3 and L4, all unchanged. Multilevel thoracic compression deformities with severe deformity at T3 and T12, moderate deformity at T7 and mild deformity at T9. At T9 there is moderate canal stenosis secondary to disc bulge and mild bony retropulsion.  Compression fractures likely iso hx multiple falls   Ortho: wbat for PT  - please follow up with ortho and PCP     #Constipation.   Hx constipation, uses miralax at home occasionally, on opioids for breakthrough pain on admission.   - c/w Miralax BID, senna 2 tabs qhs.    #Urinary retention.   Patient has a history of urinary retention, post-void residual volume 218ml on bladder scan.   - f/u PCP     Physical exam at the time of discharge:  Constitutional: NAD, lying still in bed   HEENT: NC/AT, PERRL/EOMI, anicteric sclera, dry MM   Respiratory: CTA B/L; no audible wheezing/ronchi/rales  Cardiac: +S1/S2; RRR; no M/R/G; PMI non-displaced  Gastrointestinal: soft, mildly distended, NT even to deep palpation  Back: Very ttp over mid-spine to lumbosacral spine, no obvious bruising/step-offs or other findings.   Extremities: WWP, no clubbing or cyanosis; no peripheral edema  Vascular: 2+ radial & DP pulses  Dermatologic: skin warm, dry and intact; no rashes, wounds, or scars  Neurologic: AAOx3; CNII-XII grossly intact; no focal deficits  Psychiatric: affect and characteristics of appearance, verbalizations, behaviors are appropriate   #Discharge: do not delete    89yo F w/ PMHx Breast Cx s/p R mastectomy, urinary retention, memory loss - p/w 1wk mid and lower back pain, found w/ thoracolumbar compression fractures likely iso prior falls, & acute anemia w/ unclear etiology, s/p 1u pRBC. Admitted for further mgmt.         # Anemia.   ·  Plan: P/w Hb 6.7 (bl ~11-12) Hcrit 18.7 now s/p 1u pRBC. . Plt 344. Acute drop from Hb 11-12, last Hb 11.8 on 1/27/24. BUN elevated to 43 w/o elevation in Cr, possible GI Bleed in setting of Alleve use.   No obvious s/sx bleeding. P/w severe back pain, however CTAP Angio w/o active hemorrhage: RP bleed or intraabdominal bleeding r/o'd. No e/o hemolysis, TBili wnl.   Last C-scope 15yrs ago, normal findings. No hx endoscopy, no known hx gastric ulcers. Denies any recent melena/hematochezia or BRBPR. No excessive NSAID use. MAURICIO in ED neg. No n/v or hematemesis. Pt appears to be reliable historian   Very unclear etiology re: acute drop in Hb.   s/p 1uprbc  started protonix 40mg IVP 12h  Pt does not want to pursue scope with GI to investigate cause of bleeding     #Back pain.   Presenting w/ back pain since last week, severe dull 10/10 ache. Saw Dr. Stevens (ortho) outpatient, received injections into spine w/ some minimal pain relief.  Was planned for MRI on 3/7 w/ Dr Stevens  CT Thoracic and Lumbar Spine: Since 4/19/2022, increased depression along the superior and inferior endplate of L2 now w/ moderate compression deformity, previously mild compression deformity. Cannot exclude acute on chronic compression deformity. Chronic severe compression deformities again noted at T12 and L1 with   mild to moderate chronic compression deformity at L3 and L4, all unchanged. Multilevel thoracic compression deformities with severe deformity at T3 and T12, moderate deformity at T7 and mild deformity at T9. At T9 there is moderate canal stenosis secondary to disc bulge and mild bony retropulsion.  Compression fractures likely iso hx multiple falls   Ortho: wbat for PT  - please follow up with ortho and PCP     #Constipation.   Hx constipation, uses miralax at home occasionally, on opioids for breakthrough pain on admission.   - c/w Miralax BID, senna 2 tabs qhs.    #Urinary retention.   Patient has a history of urinary retention, post-void residual volume 218ml on bladder scan.   - f/u PCP     Physical exam at the time of discharge:  Constitutional: NAD, lying still in bed   HEENT: NC/AT, PERRL/EOMI, anicteric sclera, dry MM   Respiratory: CTA B/L; no audible wheezing/ronchi/rales  Cardiac: +S1/S2; RRR; no M/R/G; PMI non-displaced  Gastrointestinal: soft, mildly distended, NT even to deep palpation  Back: Very ttp over mid-spine to lumbosacral spine, no obvious bruising/step-offs or other findings.   Extremities: WWP, no clubbing or cyanosis; no peripheral edema  Vascular: 2+ radial & DP pulses  Dermatologic: skin warm, dry and intact; no rashes, wounds, or scars  Neurologic: AAOx3; CNII-XII grossly intact; no focal deficits  Psychiatric: affect and characteristics of appearance, verbalizations, behaviors are appropriate #Discharge: do not delete    89yo F w/ PMHx Breast Cx s/p R mastectomy, urinary retention, memory loss - p/w 1wk mid and lower back pain, found w/ thoracolumbar compression fractures likely iso prior falls, & acute anemia w/ unclear etiology, s/p 1u pRBC. Admitted for further mgmt.       #Acute blood loss anemia  Anemia. Plan: P/w Hb 6.7 (bl ~11-12) Hcrit 18.7 now s/p 1u pRBC. . Plt 344. Acute drop from Hb 11-12, last Hb 11.8 on 1/27/24. BUN elevated to 43 w/o elevation in Cr, possible GI Bleed in setting of Alleve use.   No obvious s/sx bleeding. P/w severe back pain, however CTAP Angio w/o active hemorrhage: RP bleed or intraabdominal bleeding r/o'd. No e/o hemolysis, TBili wnl.   Last C-scope 15yrs ago, normal findings. No hx endoscopy, no known hx gastric ulcers. Denies any recent melena/hematochezia or BRBPR. No excessive NSAID use. MAURICIO in ED neg. No n/v or hematemesis. Pt appears to be reliable historian   Very unclear etiology re: acute drop in Hb.   s/p 1uprbc  started protonix 40mg IVP 12h with Transition to Pantoprazole 80mg qd with outpatient GI follow up.  Pt does not want to pursue scope with GI to investigate cause of bleeding inpatient. GI notified to follow up outpatient. Goals of care discussed in depth with patient aware of risks of bleeding.    #Back pain.   Presenting w/ back pain since last week, severe dull 10/10 ache. Saw Dr. Stevens (ortho) outpatient, received injections into spine w/ some minimal pain relief.  Was planned for MRI on 3/7 w/ Dr Stevens  CT Thoracic and Lumbar Spine: Since 4/19/2022, increased depression along the superior and inferior endplate of L2 now w/ moderate compression deformity, previously mild compression deformity. Cannot exclude acute on chronic compression deformity. Chronic severe compression deformities again noted at T12 and L1 with   mild to moderate chronic compression deformity at L3 and L4, all unchanged. Multilevel thoracic compression deformities with severe deformity at T3 and T12, moderate deformity at T7 and mild deformity at T9. At T9 there is moderate canal stenosis secondary to disc bulge and mild bony retropulsion.  Compression fractures likely iso hx multiple falls   Ortho: wbat for PT  - please follow up with ortho and PCP for outpatient MRI. Ortho f/u 3/7/24    #Constipation.   Hx constipation, uses miralax at home occasionally, on opioids for breakthrough pain on admission.   - c/w Miralax BID, senna 2 tabs qhs.    #Urinary retention.   Patient has a history of urinary retention, post-void residual volume 218ml on bladder scan.   - f/u PCP     Physical exam at the time of discharge:  Constitutional: NAD, lying still in bed   HEENT: NC/AT, PERRL/EOMI, anicteric sclera, dry MM   Respiratory: CTA B/L; no audible wheezing/ronchi/rales  Cardiac: +S1/S2; RRR; no M/R/G; PMI non-displaced  Gastrointestinal: soft, mildly distended, NT even to deep palpation  Back: Very ttp over mid-spine to lumbosacral spine, no obvious bruising/step-offs or other findings.   Extremities: WWP, no clubbing or cyanosis; no peripheral edema  Vascular: 2+ radial & DP pulses  Dermatologic: skin warm, dry and intact; no rashes, wounds, or scars  Neurologic: AAOx3; CNII-XII grossly intact; no focal deficits  Psychiatric: affect and characteristics of appearance, verbalizations, behaviors are appropriate

## 2024-03-04 NOTE — CONSULT NOTE ADULT - SUBJECTIVE AND OBJECTIVE BOX
Orthopaedic Surgery Consult Note  For Surgeon: Dr. Stevens    HPI:  89y old F w/ mild  dementia  p/w acute on chronic worsening Back pain over the past week, that has been worse over the past day.    Patient seen in office of Dr. Stevens last week .... Referred to Dr. Morley; subsequently received bilateral medial branch blocks with initial improvement of pain up until the past 24 hours patient reports increasingly severe back pain. Admitted for pain relief, pain control, and functional decline over the past several days  2/2 pain    Patient at baseline ambulates w/ a RW. Since severe back pain mobility has been limited 2/2 pain    S/p outpatient injections with pain mgmt patient sent home w/ tylenol and tramadol and had some relief up until recently patient reports and per chart review she has been writhing in pain 2/2 back pain        PMHx: Breast CA, Multiple falls, Back pain, Urinary retention, Osteoporosis, Macrocytosis    MEDICATIONS  (STANDING):  acetaminophen     Tablet .. 650 milliGRAM(s) Oral every 6 hours  pantoprazole  Injectable 40 milliGRAM(s) IV Push every 12 hours  polyethylene glycol 3350 17 Gram(s) Oral every 12 hours  senna 2 Tablet(s) Oral at bedtime    MEDICATIONS  (PRN):  aluminum hydroxide/magnesium hydroxide/simethicone Suspension 30 milliLiter(s) Oral every 4 hours PRN Dyspepsia  melatonin 3 milliGRAM(s) Oral at bedtime PRN Insomnia  morphine  IR 15 milliGRAM(s) Oral every 6 hours PRN Severe Pain (7 - 10)  ondansetron Injectable 4 milliGRAM(s) IV Push every 8 hours PRN Nausea and/or Vomiting  traMADol 50 milliGRAM(s) Oral every 6 hours PRN Moderate Pain (4 - 6)      Vital Signs Last 24 Hrs  T(C): 36.6 (04 Mar 2024 08:45), Max: 36.9 (03 Mar 2024 22:11)  T(F): 97.8 (04 Mar 2024 08:45), Max: 98.5 (03 Mar 2024 22:11)  HR: 79 (04 Mar 2024 08:45) (65 - 92)  BP: 129/65 (04 Mar 2024 08:45) (117/53 - 149/72)  BP(mean): --  RR: 18 (04 Mar 2024 08:45) (16 - 18)  SpO2: 95% (04 Mar 2024 08:45) (95% - 100%)  Parameters below as of 04 Mar 2024 08:45  Patient On (Oxygen Delivery Method): room air        Physical Exam:  General: Pt Alert and oriented, NAD. Mild dementia  AFVSS     Bilat LE    Sensation: SILT L2-S1     Motor: SILT   L2 (hip flexion)  Limited 2/2 pain  L3 (knee extension)  Limited 2/2 pain  L4 (ankle dorsiflexion)  5/5    L5 (long toe extension) 5/5    S1 (ankle plantar flexion) 5/5       Palpable DP & PT             Labs             8.5    10.46 )-----------( 309      ( 04 Mar 2024 05:30 )             26.0     03-04    135  |  100  |  31<H>  ----------------------------<  103<H>  3.6   |  26  |  0.60    Ca    8.8      04 Mar 2024 05:30  Phos  3.2     03-04  Mg     2.3     03-04    TPro  5.8<L>  /  Alb  3.3  /  TBili  0.6  /  DBili  x   /  AST  19  /  ALT  10  /  AlkPhos  68  03-04      Imaging:   -CT Thoracic and Lumbar Spine: Since 4/19/2022, increased depression along the superior and inferior endplate of L2 now demonstrating a moderate compression deformity, previously mild compression deformity. Cannot exclude acute on chronic compression deformity. Chronic severe compression deformities again noted at T12 and L1 with   mild to moderate chronic compression deformity at L3 and L4, all unchanged. Multilevel thoracic compression deformities with severe deformity at T3 and T12, moderate deformity at T7 and mild deformity at T9. At T9 there is moderate canal stenosis secondary to disc bulge and mild bony retropulsion.  -CT Angio Abdomen and Pelvis: No active hemorrhage. Increased compression deformity L2, see concurrent CT thoracolumbar spine.      A/P: 89yFemale w/ chronic compression deformities now presenting w/ worsening back pain s/p being seen by spine outpatient Dr. Stevens an pain mgm Dr. Morley.     Recommend  -MRI T/L spine  -Multimodal pain control  -Pain mgmt  -WBAT  -Rest of plan pending results of MRI  -Discussed with Dr. Stevens    Ortho Pager [phone]   Orthopaedic Surgery Consult Note  For Surgeon: Dr. Stevens    HPI:  89y old F w/ mild  dementia  p/w acute on chronic worsening back pain over the past week, that has been worse over the past several days day.    Patient seen in office of Dr. Stevens last week .... Referred to Dr. Morley; subsequently received bilateral medial branch blocks with initial improvement in pain up until the past 24 hours patient reports increasingly severe back pain. Admitted 2/2 functional decline over the past several days  2/2 pain in addition to attempt at pain relief, pain control. Patient at baseline ambulates w/ a RW. Since severe back pain mobility has been limited 2/2 pain    Patient reports s/p outpatient injections with pain mgmt patient she was sent home w/ tylenol and tramadol and had some relief up until recently patient reports  writhing back pain      PMHx: Breast CA, Multiple falls, Back pain, Urinary retention, Osteoporosis, Macrocytosis      MEDICATIONS  (STANDING):  acetaminophen     Tablet .. 650 milliGRAM(s) Oral every 6 hours  pantoprazole  Injectable 40 milliGRAM(s) IV Push every 12 hours  polyethylene glycol 3350 17 Gram(s) Oral every 12 hours  senna 2 Tablet(s) Oral at bedtime    MEDICATIONS  (PRN):  aluminum hydroxide/magnesium hydroxide/simethicone Suspension 30 milliLiter(s) Oral every 4 hours PRN Dyspepsia  melatonin 3 milliGRAM(s) Oral at bedtime PRN Insomnia  morphine  IR 15 milliGRAM(s) Oral every 6 hours PRN Severe Pain (7 - 10)  ondansetron Injectable 4 milliGRAM(s) IV Push every 8 hours PRN Nausea and/or Vomiting  traMADol 50 milliGRAM(s) Oral every 6 hours PRN Moderate Pain (4 - 6)      Vital Signs Last 24 Hrs  T(C): 36.6 (04 Mar 2024 08:45), Max: 36.9 (03 Mar 2024 22:11)  T(F): 97.8 (04 Mar 2024 08:45), Max: 98.5 (03 Mar 2024 22:11)  HR: 79 (04 Mar 2024 08:45) (65 - 92)  BP: 129/65 (04 Mar 2024 08:45) (117/53 - 149/72)  BP(mean): --  RR: 18 (04 Mar 2024 08:45) (16 - 18)  SpO2: 95% (04 Mar 2024 08:45) (95% - 100%)  Parameters below as of 04 Mar 2024 08:45  Patient On (Oxygen Delivery Method): room air        Physical Exam:  General: Pt Alert and oriented, NAD. Mild dementia  AFVSS     Bilat LE    Sensation: SILT L2-S1     Motor: SILT   L2 (hip flexion)  Limited 2/2 pain  L3 (knee extension)  Limited 2/2 pain  L4 (ankle dorsiflexion)  5/5    L5 (long toe extension) 5/5    S1 (ankle plantar flexion) 5/5       Palpable DP & PT             Labs             8.5    10.46 )-----------( 309      ( 04 Mar 2024 05:30 )             26.0     03-04    135  |  100  |  31<H>  ----------------------------<  103<H>  3.6   |  26  |  0.60    Ca    8.8      04 Mar 2024 05:30  Phos  3.2     03-04  Mg     2.3     03-04    TPro  5.8<L>  /  Alb  3.3  /  TBili  0.6  /  DBili  x   /  AST  19  /  ALT  10  /  AlkPhos  68  03-04      Imaging:   -CT Thoracic and Lumbar Spine: Since 4/19/2022, increased depression along the superior and inferior endplate of L2 now demonstrating a moderate compression deformity, previously mild compression deformity. Cannot exclude acute on chronic compression deformity. Chronic severe compression deformities again noted at T12 and L1 with   mild to moderate chronic compression deformity at L3 and L4, all unchanged. Multilevel thoracic compression deformities with severe deformity at T3 and T12, moderate deformity at T7 and mild deformity at T9. At T9 there is moderate canal stenosis secondary to disc bulge and mild bony retropulsion.  -CT Angio Abdomen and Pelvis: No active hemorrhage. Increased compression deformity L2, see concurrent CT thoracolumbar spine.      A/P: 89yFemale w/ chronic compression deformities now presenting w/ worsening back pain s/p being seen by spine outpatient Dr. Stevens an pain mgm Dr. Morley.     Recommend  -MRI T/L spine  -Multimodal pain control  -Pain mgmt  -WBAT  -Rest of plan pending results of MRI  -Discussed with Dr. Stevens    Ortho Pager [phone]   Orthopaedic Surgery Consult Note  For Surgeon: Dr. Stevens    HPI:  89y old F w/ mild  dementia  p/w acute on chronic worsening back pain over the past week, that has been worse over the past several days day.    Patient seen in office of Dr. Stevens last week .... Referred to Dr. Morley; subsequently received bilateral medial branch blocks with initial improvement in pain up until the past 24 hours patient reports increasingly severe back pain. Admitted 2/2 functional decline over the past several days  2/2 pain in addition to attempt at pain relief, pain control. Patient at baseline ambulates w/ a RW. Since severe back pain mobility has been limited 2/2 pain    Patient reports s/p outpatient injections with pain mgmt patient she was sent home w/ tylenol and tramadol and had some relief up until recently patient reports  writhing back pain      PMHx: Breast CA, Multiple falls, Back pain, Urinary retention, Osteoporosis, Macrocytosis      MEDICATIONS  (STANDING):  acetaminophen     Tablet .. 650 milliGRAM(s) Oral every 6 hours  pantoprazole  Injectable 40 milliGRAM(s) IV Push every 12 hours  polyethylene glycol 3350 17 Gram(s) Oral every 12 hours  senna 2 Tablet(s) Oral at bedtime    MEDICATIONS  (PRN):  aluminum hydroxide/magnesium hydroxide/simethicone Suspension 30 milliLiter(s) Oral every 4 hours PRN Dyspepsia  melatonin 3 milliGRAM(s) Oral at bedtime PRN Insomnia  morphine  IR 15 milliGRAM(s) Oral every 6 hours PRN Severe Pain (7 - 10)  ondansetron Injectable 4 milliGRAM(s) IV Push every 8 hours PRN Nausea and/or Vomiting  traMADol 50 milliGRAM(s) Oral every 6 hours PRN Moderate Pain (4 - 6)      Vital Signs Last 24 Hrs  T(C): 36.6 (04 Mar 2024 08:45), Max: 36.9 (03 Mar 2024 22:11)  T(F): 97.8 (04 Mar 2024 08:45), Max: 98.5 (03 Mar 2024 22:11)  HR: 79 (04 Mar 2024 08:45) (65 - 92)  BP: 129/65 (04 Mar 2024 08:45) (117/53 - 149/72)  BP(mean): --  RR: 18 (04 Mar 2024 08:45) (16 - 18)  SpO2: 95% (04 Mar 2024 08:45) (95% - 100%)  Parameters below as of 04 Mar 2024 08:45  Patient On (Oxygen Delivery Method): room air        Physical Exam:  General: Pt Alert and oriented, NAD. Mild dementia  AFVSS     Bilat LE    Sensation: SILT L2-S1     Motor: SILT   L2 (hip flexion)  Limited 2/2 pain  L3 (knee extension)  Limited 2/2 pain  L4 (ankle dorsiflexion)  5/5    L5 (long toe extension) 5/5    S1 (ankle plantar flexion) 5/5       Palpable DP & PT             Labs             8.5    10.46 )-----------( 309      ( 04 Mar 2024 05:30 )             26.0     03-04    135  |  100  |  31<H>  ----------------------------<  103<H>  3.6   |  26  |  0.60    Ca    8.8      04 Mar 2024 05:30  Phos  3.2     03-04  Mg     2.3     03-04    TPro  5.8<L>  /  Alb  3.3  /  TBili  0.6  /  DBili  x   /  AST  19  /  ALT  10  /  AlkPhos  68  03-04      Imaging:   -CT Thoracic and Lumbar Spine: Since 4/19/2022, increased depression along the superior and inferior endplate of L2 now demonstrating a moderate compression deformity, previously mild compression deformity. Cannot exclude acute on chronic compression deformity. Chronic severe compression deformities again noted at T12 and L1 with   mild to moderate chronic compression deformity at L3 and L4, all unchanged. Multilevel thoracic compression deformities with severe deformity at T3 and T12, moderate deformity at T7 and mild deformity at T9. At T9 there is moderate canal stenosis secondary to disc bulge and mild bony retropulsion.  -CT Angio Abdomen and Pelvis: No active hemorrhage. Increased compression deformity L2, see concurrent CT thoracolumbar spine.      A/P: 89yFemale w/ chronic compression deformities now presenting w/ worsening back pain s/p being seen by spine outpatient Dr. Stevens an pain mgm Dr. Morley.     Recommend  -MRI T/L spine  -Multimodal pain control  -Pain mgmt  -WBAT  -Rest of plan pending results of MRI    Discussed with Dr. Stevens  Ortho Pager [phone]

## 2024-03-04 NOTE — PATIENT PROFILE ADULT - FALL HARM RISK - HARM RISK INTERVENTIONS
Communicate Risk of Fall with Harm to all staff/Discuss with provider need for PT consult/Monitor gait and stability/Provide patient with walking aids - walker, cane, crutches/Reinforce activity limits and safety measures with patient and family/Tailored Fall Risk Interventions/Use of alarms - bed, chair and/or voice tab/Visual Cue: Yellow wristband and red socks/Bed in lowest position, wheels locked, appropriate side rails in place/Call bell, personal items and telephone in reach/Instruct patient to call for assistance before getting out of bed or chair/Non-slip footwear when patient is out of bed/[place] to call system/Physically safe environment - no spills, clutter or unnecessary equipment/Purposeful Proactive Rounding/Room/bathroom lighting operational, light cord in reach

## 2024-03-04 NOTE — H&P ADULT - HISTORY OF PRESENT ILLNESS
89yo F w/ PMHx Breast Cx s/p R mastectomy, urinary retention, memory loss - p/w 1wk mid and lower back pain. Per pt's aid, pt has been intemittently screaming in pain for last 1 d, and was not able to sleep. Was recently admitted in Jan after a fall, was found to have the flu, and discharged to [facility]. Recovered well in [facility] and discharged to home w/ HHA help. Reports back pain that started last week on 2/27, saw Dr. Stevens outpatient last week and received 16 injections into her back for pain. Denies falling again, has been ambulating w/ walker and wheelchair even at home, with help of her aides. Had some relief but pain continued. Pt took tylenol and tramadol at home ~1PM w/ minimal pain relief. Has not taken ibuprofen or any NSAIDs.     Denies any recent melena/hematochezia/or BRBPR. No n/v or hematemesis. Last C-scope ~15yrs ago, normal findings. Never had an endoscopy. No family hx colon cancer. Hx breast cancer s/p mastectomy ~15yrs ago, no recurrence, has regular follow-up. Has never had anemia in the past, no hx transfusions.     ED COURSE  Vitals: T: 97.8, HR: 88, BP: 119/73, RR: 17, SpO2: 100% on RA  Labs: WBC: 12.57, Neutrophil: 84.2% RBC: 1.99, Hb: 6.7 --> 6.3, Hct: 20.5 --> 18.7, MCV: 103,   EKG: NSR with PVC and PAC   Imaging:  -CT Thoracic and Lumbar Spine: Since 4/19/2022, increased depression along the superior and inferior endplate of L2 now demonstrating a moderate compression deformity, previously mild compression deformity. Cannot exclude acute on chronic compression deformity. Chronic severe compression deformities again noted at T12 and L1 with   mild to moderate chronic compression deformity at L3 and L4, all unchanged. Multilevel thoracic compression deformities with severe deformity at T3 and T12, moderate deformity at T7 and mild deformity at T9. At T9 there is moderate canal stenosis secondary to disc bulge and mild bony retropulsion.  -CT Angio Abdomen and Pelvis: No active hemorrhage. Increased compression deformity L2, see concurrent CT thoracolumbar spine.  Interventions: Acetaminophen 975mg x1, Morphine 2mg IVP x2, Tramadol 50mg x1, ordered 1u pRBC    89yo F w/ PMHx Breast Cx s/p R mastectomy, urinary retention, memory loss - p/w 1wk mid and lower back pain. Per pt's aid, pt has been intemittently screaming in pain for last 1 d, and was not able to sleep. Was recently admitted in Jan after a fall, was found to have the flu, and discharged to [facility]. Recovered well in [facility] and discharged to home w/ HHA help. Reports back pain that started last week on 2/27, saw Dr. Stevens outpatient last week and received 16 injections into her back for pain. Denies falling again, has been ambulating w/ walker and wheelchair even at home, with help of her aides. Had some relief but pain continued. Pt took tylenol and tramadol at home ~1PM w/ minimal pain relief. Has not taken ibuprofen or any NSAIDs.     Denies any recent melena/hematochezia/or BRBPR. No n/v or hematemesis. Last C-scope ~15yrs ago, normal findings. Never had an endoscopy. No family hx colon cancer. Hx breast cancer s/p mastectomy ~15yrs ago, no recurrence, has regular follow-up. Has never had anemia in the past, no hx transfusions.     ED COURSE  Vitals: T: 97.8, HR: 88, BP: 119/73, RR: 17, SpO2: 100% on RA  Labs: WBC: 12.57, Neutrophil: 84.2% RBC: 1.99, Hb: 6.7 --> 6.3, Hct: 20.5 --> 18.7, MCV: 103,   EKG: NSR with PVC and PAC   Imaging:  -CT Thoracic and Lumbar Spine: Since 4/19/2022, increased depression along the superior and inferior endplate of L2 now demonstrating a moderate compression deformity, previously mild compression deformity. Cannot exclude acute on chronic compression deformity. Chronic severe compression deformities again noted at T12 and L1 with   mild to moderate chronic compression deformity at L3 and L4, all unchanged. Multilevel thoracic compression deformities with severe deformity at T3 and T12, moderate deformity at T7 and mild deformity at T9. At T9 there is moderate canal stenosis secondary to disc bulge and mild bony retropulsion.  -CT Angio Abdomen and Pelvis: No active hemorrhage. Increased compression deformity L2, see concurrent CT thoracolumbar spine.  Interventions: Acetaminophen 975mg x1, Morphine 2mg IVP x2, Tramadol 50mg x1, ordered 1u pRBC    89yo F w/ PMHx Breast Cx s/p R mastectomy, urinary retention, memory loss - p/w 1wk mid and lower back pain. Per pt's aid, pt has been intermittently screaming in pain for last 1 d, and was not able to sleep. Was recently admitted in Jan after a fall, was found to have the flu, and discharged to [facility]. Recovered well in [facility] and discharged to home w/ HHA help. Reports back pain that started last week on 2/27, saw Dr. Stevens outpatient last week and received 16 injections into her back for pain. Denies falling again, has been ambulating w/ walker and wheelchair even at home, with help of her aides. Had some relief but pain continued. Pt took tylenol and tramadol at home ~1PM w/ minimal pain relief. Has not taken ibuprofen or any NSAIDs.     Denies any recent melena/hematochezia/or BRBPR. No n/v or hematemesis. Last C-scope ~15yrs ago, normal findings. Never had an endoscopy. No family hx colon cancer. Hx breast cancer s/p mastectomy ~15yrs ago, no recurrence, has regular follow-up. Has never had anemia in the past, no hx transfusions.     ED COURSE  Vitals: T: 97.8, HR: 88, BP: 119/73, RR: 17, SpO2: 100% on RA  Labs: WBC: 12.57, Neutrophil: 84.2% RBC: 1.99, Hb: 6.7 --> 6.3, Hct: 20.5 --> 18.7, MCV: 103,   EKG: NSR with PVC and PAC   Imaging:  -CT Thoracic and Lumbar Spine: Since 4/19/2022, increased depression along the superior and inferior endplate of L2 now demonstrating a moderate compression deformity, previously mild compression deformity. Cannot exclude acute on chronic compression deformity. Chronic severe compression deformities again noted at T12 and L1 with   mild to moderate chronic compression deformity at L3 and L4, all unchanged. Multilevel thoracic compression deformities with severe deformity at T3 and T12, moderate deformity at T7 and mild deformity at T9. At T9 there is moderate canal stenosis secondary to disc bulge and mild bony retropulsion.  -CT Angio Abdomen and Pelvis: No active hemorrhage. Increased compression deformity L2, see concurrent CT thoracolumbar spine.  Interventions: Acetaminophen 975mg x1, Morphine 2mg IVP x2, Tramadol 50mg x1, ordered 1u pRBC    89yo F w/ PMHx Breast Cx s/p R mastectomy, urinary retention, memory loss - p/w 1wk mid and lower back pain. Per pt's aid, pt has been intermittently screaming in pain for last 1 d, and was not able to sleep.     Was recently admitted in Jan after a fall, was found to have the flu, and discharged to [facility]. Recovered well in [facility] and discharged to home w/ HHA help.  Denies falling again since discharge from [facility], has been ambulating w/ walker and wheelchair even at home, with help of her aides.  However last wk ~2/27, developed severe mid & lower back pain, nonradiating. Saw Dr. Stevens outpatient  and received 16 injections into her back for pain. Had some relief but pain continued, and tylenol and tramadol at home last dose 3/3  ~1PM - w/ only mild relief. Has not taken ibuprofen or any NSAIDs.     Denies any recent melena/hematochezia/or BRBPR. No n/v or hematemesis. Last C-scope ~15yrs ago, normal findings. Never had an endoscopy. No family hx colon cancer. Hx breast cancer s/p mastectomy ~15yrs ago, no recurrence, has regular follow-up. Has never had anemia in the past, no hx transfusions.     ED COURSE  Vitals: T: 97.8, HR: 88, BP: 119/73, RR: 17, SpO2: 100% on RA  Labs: WBC: 12.57, Neutrophil: 84.2% RBC: 1.99, Hb: 6.7 --> 6.3, Hct: 20.5 --> 18.7, MCV: 103,   EKG: NSR with PVC and PAC   Imaging:  -CT Thoracic and Lumbar Spine: Since 4/19/2022, increased depression along the superior and inferior endplate of L2 now demonstrating a moderate compression deformity, previously mild compression deformity. Cannot exclude acute on chronic compression deformity. Chronic severe compression deformities again noted at T12 and L1 with   mild to moderate chronic compression deformity at L3 and L4, all unchanged. Multilevel thoracic compression deformities with severe deformity at T3 and T12, moderate deformity at T7 and mild deformity at T9. At T9 there is moderate canal stenosis secondary to disc bulge and mild bony retropulsion.  -CT Angio Abdomen and Pelvis: No active hemorrhage. Increased compression deformity L2, see concurrent CT thoracolumbar spine.  Interventions: Acetaminophen 975mg x1, Morphine 2mg IVP x2, Tramadol 50mg x1, ordered 1u pRBC

## 2024-03-04 NOTE — CONSULT NOTE ADULT - ASSESSMENT
89yo F w/ PMHx Breast Cx s/p R mastectomy, urinary retention, memory loss - p/w 1wk mid and lower back pain, found w/ thoracolumbar compression fractures likely iso prior falls, & acute anemia w/ unclear etiology, s/p 1u pRBC. GI consulted for anemia evaluation.    #Macrocytic anemia  Sudden hgb drop from Stevan to 6.7  Iron 126  Iron sat 49  Ferritin 163  TIBC 258   on arrival   CTAP without obvious bleeding; no lymphadenopathy or GI pathology    Recommendations:  - Resuscitation per primary team  - Follow up b12/ folate  - Avoid NSAIDS  - Offered patient bidirectional endoscopy for anemia evaluation; however, given risks/ benefits of procedure in patient without iron deficiency and over signs/ symptoms of bleeding or unintentional wt loss, decision to defer inpatient endoscopy for now.   - Monitor hgb post discharge; If persistently low, please schedule patient for GI follow-up at the clinic located on the fourth floor of [address] [place] [address] by calling the office at [phone]     Thank you for allowing us to participate in the care of this patient.  GI will sign off. Please call back with any questions or concerns.     Chica Nieto MD  Gastroenterology Fellow, PGY -6  Weekday Pager [phone] 89yo F w/ PMHx Breast Cx s/p R mastectomy, urinary retention, memory loss - p/w 1wk mid and lower back pain, found w/ thoracolumbar compression fractures likely iso prior falls, & acute anemia w/ unclear etiology, s/p 1u pRBC. GI consulted for anemia evaluation.    #Macrocytic anemia  Sudden hgb drop from Stevan to 6.7  Iron 126  Iron sat 49  Ferritin 163  TIBC 258   on arrival   HDS with no obvious bleeding on admission  CTAP without obvious bleeding; no lymphadenopathy or GI pathology    Recommendations:  - Resuscitation per primary team  - Follow up b12/ folate  - Continue PPI for GI PPX on nsaids  - Avoid NSAIDS  - Offered patient bidirectional endoscopy for anemia evaluation; however, given risks/ benefits of procedure in patient without iron deficiency and over signs/ symptoms of bleeding or unintentional wt loss, decision to defer inpatient endoscopy for now.   - Monitor hgb post discharge; If persistently low, please schedule patient for GI follow-up at the clinic located on the fourth floor of [address] [place] [address] by calling the office at [phone]     Thank you for allowing us to participate in the care of this patient.  GI will sign off. Please call back with any questions or concerns.     Chica Nieto MD  Gastroenterology Fellow, PGY -6  Weekday Pager [phone]

## 2024-03-04 NOTE — H&P ADULT - ASSESSMENT
89yo F w/ PMHx Breast Cx s/p R mastectomy, urinary retention, memory loss - p/w 1wk mid and lower back pain, found w/ thoracolumbar compression fractures likely iso prior falls, & acute anemia w/ unclear etiology, s/p 1u pRBC. Admitted for further mgmt.

## 2024-03-04 NOTE — H&P ADULT - PROBLEM/PLAN-4
· S/p stent to LAD on 9/6/19  · Ct Plavix  · ASA had been held as outpatient due to GIB - restarted while in ICU - monitor Hb - if drops post transfusion hold ASA  · Ct statin  · No BB due to symptomatic bradycardia  · Treatment per cardiology  · Eliquis is on HOLD due to anemia and possible GIB DISPLAY PLAN FREE TEXT

## 2024-03-04 NOTE — H&P ADULT - PROBLEM SELECTOR PLAN 4
F: None  E: Replete PRN  N: NPO for now pending GI  P: Holding iso acute anemia F: None  E: Replete PRN  N: Regular diet  P: Holding chemical ppx given anemia. +SCDs.

## 2024-03-04 NOTE — H&P ADULT - NSHPLABSRESULTS_GEN_ALL_CORE
6.3    12.98 )-----------( 344      ( 03 Mar 2024 21:02 )             18.7       03-03    138  |  101  |  43<H>  ----------------------------<  127<H>  3.5   |  26  |  0.68    Ca    8.5      03 Mar 2024 19:12    TPro  5.9<L>  /  Alb  3.4  /  TBili  0.3  /  DBili  x   /  AST  14  /  ALT  9<L>  /  AlkPhos  67  03-03         CAPILLARY BLOOD GLUCOSE                         Lactate Trend  03-03 @ 19:12 Lactate:1.1       Urinalysis Basic - ( 03 Mar 2024 19:12 )    Color: x / Appearance: x / SG: x / pH: x  Gluc: 127 mg/dL / Ketone: x  / Bili: x / Urobili: x   Blood: x / Protein: x / Nitrite: x   Leuk Esterase: x / RBC: x / WBC x   Sq Epi: x / Non Sq Epi: x / Bacteria: x              RADIOLOGY, EKG & ADDITIONAL TESTS: Reviewed.

## 2024-03-04 NOTE — H&P ADULT - PROBLEM SELECTOR PLAN 3
Hx constipation, uses miralax at home occasionally  - Miralax BID, senna 2 tabs qhs Hx constipation, uses miralax at home occasionally  - Miralax BID, senna 2 tabs qhs.

## 2024-03-04 NOTE — PATIENT PROFILE ADULT - HAS THE PATIENT USED TOBACCO IN THE PAST 30 DAYS?
SUBJECTIVE  Yong Chavez is a 64 year old male patient of Dr. Gallegos.  He presents for instructions on injecting Dupixent.    PMH:  Past Medical History:   Diagnosis Date   • AIN grade I     remotely    • Bradycardia    • Condyloma acuminatum 11/16/2000   • Hyperplastic polyp of cecum 2019       ALL:  ALLERGIES:  No Known Allergies    MEDS:  Current Outpatient Medications   Medication Sig Dispense Refill   • Dupilumab 300 MG/2ML Solution Pen-injector Inject 600 mg subq in different sites on day 1 then inject 300 mg sub q every other week starting at day 15 6 mL 0   • hydrOXYzine (ATARAX) 25 MG tablet Take 1-2 tablets by mouth every 6 hours as needed for Itching. 60 tablet 1   • predniSONE (DELTASONE) 20 MG tablet 3 tabs po qd x 5days, 2 tabs po qd x 5 days, 1 tab po qd x 5days with meals 30 tablet 0   • halobetasol (ULTRAVATE) 0.05 % ointment Apply topically 2 times daily. 50 g 0   • levocetirizine (Xyzal) 5 MG tablet Take 1 tablet by mouth 2 times daily. 60 tablet 2   • pimecrolimus (ELIDEL) 1 % cream Apply topically 2 times daily. To affected area until clear 60 g 2   • minocycline (MINOCIN) 75 MG capsule Take 1 capsule by mouth 2 times daily. 60 capsule 0   • clindamycin (CLEOCIN T) 1 % lotion Apply to the affected area twice daily as directed 60 mL 2     No current facility-administered medications for this visit.         VISIT OBJECTIVE  Patient was instructed on the action, indication of use, duration of activity, and possible side effects of using Dupixent.  Demonstrated how to use the device.  In turn, the patient also demonstrated the technique of using the device and gave first 2 injections (two 300 mg pens for 600 mg loading dose) in office. Explained how to rotate sites. Recommended storage, maintenance, disposal, and length of time for use was also discussed.    Time spent with patient: 20 minutes      Angelique Dooley, ALBINOD    
No

## 2024-03-05 ENCOUNTER — TRANSCRIPTION ENCOUNTER (OUTPATIENT)
Age: 89
End: 2024-03-05

## 2024-03-05 VITALS
TEMPERATURE: 98 F | SYSTOLIC BLOOD PRESSURE: 131 MMHG | RESPIRATION RATE: 18 BRPM | DIASTOLIC BLOOD PRESSURE: 70 MMHG | HEART RATE: 89 BPM | OXYGEN SATURATION: 96 %

## 2024-03-05 LAB
ALBUMIN SERPL ELPH-MCNC: 3.1 G/DL — LOW (ref 3.3–5)
ALP SERPL-CCNC: 63 U/L — SIGNIFICANT CHANGE UP (ref 40–120)
ALT FLD-CCNC: 8 U/L — LOW (ref 10–45)
ANION GAP SERPL CALC-SCNC: 7 MMOL/L — SIGNIFICANT CHANGE UP (ref 5–17)
AST SERPL-CCNC: 14 U/L — SIGNIFICANT CHANGE UP (ref 10–40)
BASOPHILS # BLD AUTO: 0.03 K/UL — SIGNIFICANT CHANGE UP (ref 0–0.2)
BASOPHILS NFR BLD AUTO: 0.3 % — SIGNIFICANT CHANGE UP (ref 0–2)
BILIRUB SERPL-MCNC: 0.4 MG/DL — SIGNIFICANT CHANGE UP (ref 0.2–1.2)
BUN SERPL-MCNC: 32 MG/DL — HIGH (ref 7–23)
CALCIUM SERPL-MCNC: 8 MG/DL — LOW (ref 8.4–10.5)
CHLORIDE SERPL-SCNC: 100 MMOL/L — SIGNIFICANT CHANGE UP (ref 96–108)
CO2 SERPL-SCNC: 27 MMOL/L — SIGNIFICANT CHANGE UP (ref 22–31)
CREAT SERPL-MCNC: 0.81 MG/DL — SIGNIFICANT CHANGE UP (ref 0.5–1.3)
EGFR: 68 ML/MIN/1.73M2 — SIGNIFICANT CHANGE UP
EOSINOPHIL # BLD AUTO: 0.6 K/UL — HIGH (ref 0–0.5)
EOSINOPHIL NFR BLD AUTO: 6.4 % — HIGH (ref 0–6)
GLUCOSE SERPL-MCNC: 99 MG/DL — SIGNIFICANT CHANGE UP (ref 70–99)
HCT VFR BLD CALC: 22.8 % — LOW (ref 34.5–45)
HGB BLD-MCNC: 7.4 G/DL — LOW (ref 11.5–15.5)
IMM GRANULOCYTES NFR BLD AUTO: 1 % — HIGH (ref 0–0.9)
LYMPHOCYTES # BLD AUTO: 1.59 K/UL — SIGNIFICANT CHANGE UP (ref 1–3.3)
LYMPHOCYTES # BLD AUTO: 17 % — SIGNIFICANT CHANGE UP (ref 13–44)
MAGNESIUM SERPL-MCNC: 2.2 MG/DL — SIGNIFICANT CHANGE UP (ref 1.6–2.6)
MCHC RBC-ENTMCNC: 32 PG — SIGNIFICANT CHANGE UP (ref 27–34)
MCHC RBC-ENTMCNC: 32.5 GM/DL — SIGNIFICANT CHANGE UP (ref 32–36)
MCV RBC AUTO: 98.7 FL — SIGNIFICANT CHANGE UP (ref 80–100)
MONOCYTES # BLD AUTO: 0.63 K/UL — SIGNIFICANT CHANGE UP (ref 0–0.9)
MONOCYTES NFR BLD AUTO: 6.7 % — SIGNIFICANT CHANGE UP (ref 2–14)
NEUTROPHILS # BLD AUTO: 6.4 K/UL — SIGNIFICANT CHANGE UP (ref 1.8–7.4)
NEUTROPHILS NFR BLD AUTO: 68.6 % — SIGNIFICANT CHANGE UP (ref 43–77)
NRBC # BLD: 0 /100 WBCS — SIGNIFICANT CHANGE UP (ref 0–0)
PHOSPHATE SERPL-MCNC: 3.9 MG/DL — SIGNIFICANT CHANGE UP (ref 2.5–4.5)
PLATELET # BLD AUTO: 335 K/UL — SIGNIFICANT CHANGE UP (ref 150–400)
POTASSIUM SERPL-MCNC: 3.8 MMOL/L — SIGNIFICANT CHANGE UP (ref 3.5–5.3)
POTASSIUM SERPL-SCNC: 3.8 MMOL/L — SIGNIFICANT CHANGE UP (ref 3.5–5.3)
PROT SERPL-MCNC: 5.4 G/DL — LOW (ref 6–8.3)
RBC # BLD: 2.31 M/UL — LOW (ref 3.8–5.2)
RBC # FLD: 19.3 % — HIGH (ref 10.3–14.5)
SODIUM SERPL-SCNC: 134 MMOL/L — LOW (ref 135–145)
WBC # BLD: 9.34 K/UL — SIGNIFICANT CHANGE UP (ref 3.8–10.5)
WBC # FLD AUTO: 9.34 K/UL — SIGNIFICANT CHANGE UP (ref 3.8–10.5)

## 2024-03-05 PROCEDURE — 82272 OCCULT BLD FECES 1-3 TESTS: CPT

## 2024-03-05 PROCEDURE — 84466 ASSAY OF TRANSFERRIN: CPT

## 2024-03-05 PROCEDURE — 86850 RBC ANTIBODY SCREEN: CPT

## 2024-03-05 PROCEDURE — 82607 VITAMIN B-12: CPT

## 2024-03-05 PROCEDURE — 72128 CT CHEST SPINE W/O DYE: CPT | Mod: MC

## 2024-03-05 PROCEDURE — 83540 ASSAY OF IRON: CPT

## 2024-03-05 PROCEDURE — 83735 ASSAY OF MAGNESIUM: CPT

## 2024-03-05 PROCEDURE — 86900 BLOOD TYPING SEROLOGIC ABO: CPT

## 2024-03-05 PROCEDURE — 86901 BLOOD TYPING SEROLOGIC RH(D): CPT

## 2024-03-05 PROCEDURE — 85730 THROMBOPLASTIN TIME PARTIAL: CPT

## 2024-03-05 PROCEDURE — 96374 THER/PROPH/DIAG INJ IV PUSH: CPT

## 2024-03-05 PROCEDURE — 82728 ASSAY OF FERRITIN: CPT

## 2024-03-05 PROCEDURE — 80053 COMPREHEN METABOLIC PANEL: CPT

## 2024-03-05 PROCEDURE — 36415 COLL VENOUS BLD VENIPUNCTURE: CPT

## 2024-03-05 PROCEDURE — 99285 EMERGENCY DEPT VISIT HI MDM: CPT

## 2024-03-05 PROCEDURE — 72131 CT LUMBAR SPINE W/O DYE: CPT | Mod: MC

## 2024-03-05 PROCEDURE — P9016: CPT

## 2024-03-05 PROCEDURE — 84100 ASSAY OF PHOSPHORUS: CPT

## 2024-03-05 PROCEDURE — 82746 ASSAY OF FOLIC ACID SERUM: CPT

## 2024-03-05 PROCEDURE — 83550 IRON BINDING TEST: CPT

## 2024-03-05 PROCEDURE — 97161 PT EVAL LOW COMPLEX 20 MIN: CPT

## 2024-03-05 PROCEDURE — 85025 COMPLETE CBC W/AUTO DIFF WBC: CPT

## 2024-03-05 PROCEDURE — 85610 PROTHROMBIN TIME: CPT

## 2024-03-05 PROCEDURE — 85027 COMPLETE CBC AUTOMATED: CPT

## 2024-03-05 PROCEDURE — 99233 SBSQ HOSP IP/OBS HIGH 50: CPT | Mod: GC

## 2024-03-05 PROCEDURE — 85045 AUTOMATED RETICULOCYTE COUNT: CPT

## 2024-03-05 PROCEDURE — 74174 CTA ABD&PLVS W/CONTRAST: CPT | Mod: MC

## 2024-03-05 PROCEDURE — 83010 ASSAY OF HAPTOGLOBIN QUANT: CPT

## 2024-03-05 PROCEDURE — 71045 X-RAY EXAM CHEST 1 VIEW: CPT

## 2024-03-05 PROCEDURE — 86923 COMPATIBILITY TEST ELECTRIC: CPT

## 2024-03-05 PROCEDURE — 36430 TRANSFUSION BLD/BLD COMPNT: CPT

## 2024-03-05 PROCEDURE — 83615 LACTATE (LD) (LDH) ENZYME: CPT

## 2024-03-05 PROCEDURE — 83605 ASSAY OF LACTIC ACID: CPT

## 2024-03-05 RX ORDER — SENNA PLUS 8.6 MG/1
2 TABLET ORAL
Qty: 60 | Refills: 0
Start: 2024-03-05 | End: 2024-04-03

## 2024-03-05 RX ADMIN — Medication 975 MILLIGRAM(S): at 00:29

## 2024-03-05 RX ADMIN — Medication 975 MILLIGRAM(S): at 01:00

## 2024-03-05 RX ADMIN — OXYCODONE HYDROCHLORIDE 2.5 MILLIGRAM(S): 5 TABLET ORAL at 09:34

## 2024-03-05 RX ADMIN — Medication 975 MILLIGRAM(S): at 06:48

## 2024-03-05 RX ADMIN — Medication 975 MILLIGRAM(S): at 13:04

## 2024-03-05 RX ADMIN — OXYCODONE HYDROCHLORIDE 2.5 MILLIGRAM(S): 5 TABLET ORAL at 10:34

## 2024-03-05 RX ADMIN — Medication 975 MILLIGRAM(S): at 12:04

## 2024-03-05 RX ADMIN — POLYETHYLENE GLYCOL 3350 17 GRAM(S): 17 POWDER, FOR SOLUTION ORAL at 06:47

## 2024-03-05 RX ADMIN — PANTOPRAZOLE SODIUM 40 MILLIGRAM(S): 20 TABLET, DELAYED RELEASE ORAL at 06:48

## 2024-03-05 RX ADMIN — Medication 975 MILLIGRAM(S): at 07:14

## 2024-03-05 NOTE — PROGRESS NOTE ADULT - ASSESSMENT
89yo F w/ PMHx Breast Cx s/p R mastectomy, urinary retention, memory loss - p/w 1wk mid and lower back pain, found w/ thoracolumbar compression fractures likely iso prior falls, & acute anemia w/ unclear etiology, s/p 1u pRBC. Admitted for further mgmt.     
89yo F w/ PMHx Breast Cx s/p R mastectomy, urinary retention, memory loss - p/w 1wk mid and lower back pain, found w/ thoracolumbar compression fractures likely iso prior falls, & acute anemia w/ unclear etiology, s/p 1u pRBC. Admitted for further mgmt.

## 2024-03-05 NOTE — PROGRESS NOTE ADULT - PROBLEM SELECTOR PLAN 5
F: None  E: Replete PRN  N: Regular diet  P: hold lovenox until endoscopy in setting of anemia and concern for upper GI bleed
F: None  E: Replete PRN  N: Regular diet  P: hold lovenox until endoscopy in setting of anemia and concern for upper GI bleed

## 2024-03-05 NOTE — PROGRESS NOTE ADULT - PROBLEM SELECTOR PLAN 4
Patient has a history of urinary retention, post-void residual volume 218ml on bladder scan.     -continue to monitor
Patient has a history of urinary retention, post-void residual volume 218ml on bladder scan.     -straight cath w trial of void

## 2024-03-05 NOTE — PHYSICAL THERAPY INITIAL EVALUATION ADULT - ADDITIONAL COMMENTS
Pt reports living in an elevator building, has HHA 24/7, uses RW for short distances but has w/c for community and household distances. Pt reports that at baseline she mostly stand-pivots with HHA to wheel chair.

## 2024-03-05 NOTE — CONSULT NOTE ADULT - SUBJECTIVE AND OBJECTIVE BOX
Patient is a 89y old  Female who presents with a chief complaint of Back pain, anemia (05 Mar 2024 09:11)      HPI:  89yo F w/ PMHx Breast Cx s/p R mastectomy, urinary retention, memory loss - p/w 1wk mid and lower back pain. Per pt's aid, pt has been intermittently screaming in pain for last 1 d, and was not able to sleep.     Was recently admitted in Jan after a fall, was found to have the flu, and discharged to [facility]. Recovered well in [facility] and discharged to home w/ HHA help.  Denies falling again since discharge from [facility], has been ambulating w/ walker and wheelchair even at home, with help of her aides.  However last wk ~2/27, developed severe mid & lower back pain, nonradiating. Saw Dr. Stevens outpatient  and received 16 injections into her back for pain. Had some relief but pain continued, and tylenol and tramadol at home last dose 3/3  ~1PM - w/ only mild relief. Has not taken ibuprofen or any NSAIDs.     Denies any recent melena/hematochezia/or BRBPR. No n/v or hematemesis. Last C-scope ~15yrs ago, normal findings. Never had an endoscopy. No family hx colon cancer. Hx breast cancer s/p mastectomy ~15yrs ago, no recurrence, has regular follow-up. Has never had anemia in the past, no hx transfusions.     ED COURSE  Vitals: T: 97.8, HR: 88, BP: 119/73, RR: 17, SpO2: 100% on RA  Labs: WBC: 12.57, Neutrophil: 84.2% RBC: 1.99, Hb: 6.7 --> 6.3, Hct: 20.5 --> 18.7, MCV: 103,   EKG: NSR with PVC and PAC   Imaging:  -CT Thoracic and Lumbar Spine: Since 4/19/2022, increased depression along the superior and inferior endplate of L2 now demonstrating a moderate compression deformity, previously mild compression deformity. Cannot exclude acute on chronic compression deformity. Chronic severe compression deformities again noted at T12 and L1 with   mild to moderate chronic compression deformity at L3 and L4, all unchanged. Multilevel thoracic compression deformities with severe deformity at T3 and T12, moderate deformity at T7 and mild deformity at T9. At T9 there is moderate canal stenosis secondary to disc bulge and mild bony retropulsion.  -CT Angio Abdomen and Pelvis: No active hemorrhage. Increased compression deformity L2, see concurrent CT thoracolumbar spine.  Interventions: Acetaminophen 975mg x1, Morphine 2mg IVP x2, Tramadol 50mg x1, ordered 1u pRBC    (04 Mar 2024 05:43)    PAST MEDICAL & SURGICAL HISTORY:  Breast CA      Multiple falls      Back pain      Urinary retention      Osteoporosis      Macrocytosis      No significant past surgical history        MEDICATIONS  (STANDING):  acetaminophen     Tablet .. 975 milliGRAM(s) Oral every 6 hours  lidocaine   4% Patch 1 Patch Transdermal every 24 hours  pantoprazole  Injectable 40 milliGRAM(s) IV Push two times a day  polyethylene glycol 3350 17 Gram(s) Oral every 12 hours  senna 2 Tablet(s) Oral at bedtime    MEDICATIONS  (PRN):  aluminum hydroxide/magnesium hydroxide/simethicone Suspension 30 milliLiter(s) Oral every 4 hours PRN Dyspepsia  melatonin 3 milliGRAM(s) Oral at bedtime PRN Insomnia  ondansetron Injectable 4 milliGRAM(s) IV Push every 8 hours PRN Nausea and/or Vomiting  oxyCODONE    IR 2.5 milliGRAM(s) Oral every 6 hours PRN Severe Pain (7 - 10)          Home Living Status :  lives alone in an elevator accessible apartment building          -  Home Services : 24/7 [facility]      Baseline Functional Level Prior to Admission :             - ADL's/ IADL's :  requires partial assistance         - Ambulatory status prior to admission :   walked with a walker         FAMILY HISTORY:  No pertinent family history in first degree relatives        CBC Full  -  ( 05 Mar 2024 05:30 )  WBC Count : 9.34 K/uL  RBC Count : 2.31 M/uL  Hemoglobin : 7.4 g/dL  Hematocrit : 22.8 %  Platelet Count - Automated : 335 K/uL  Mean Cell Volume : 98.7 fl  Mean Cell Hemoglobin : 32.0 pg  Mean Cell Hemoglobin Concentration : 32.5 gm/dL  Auto Neutrophil # : 6.40 K/uL  Auto Lymphocyte # : 1.59 K/uL  Auto Monocyte # : 0.63 K/uL  Auto Eosinophil # : 0.60 K/uL  Auto Basophil # : 0.03 K/uL  Auto Neutrophil % : 68.6 %  Auto Lymphocyte % : 17.0 %  Auto Monocyte % : 6.7 %  Auto Eosinophil % : 6.4 %  Auto Basophil % : 0.3 %      03-05    134<L>  |  100  |  32<H>  ----------------------------<  99  3.8   |  27  |  0.81    Ca    8.0<L>      05 Mar 2024 05:30  Phos  3.9     03-05  Mg     2.2     03-05    TPro  5.4<L>  /  Alb  3.1<L>  /  TBili  0.4  /  DBili  x   /  AST  14  /  ALT  8<L>  /  AlkPhos  63  03-05      Urinalysis Basic - ( 05 Mar 2024 05:30 )    Color: x / Appearance: x / SG: x / pH: x  Gluc: 99 mg/dL / Ketone: x  / Bili: x / Urobili: x   Blood: x / Protein: x / Nitrite: x   Leuk Esterase: x / RBC: x / WBC x   Sq Epi: x / Non Sq Epi: x / Bacteria: x        Radiology :     < from: CT Thoracic Spine No Cont (03.03.24 @ 20:49) >  ACC: [id number] EXAM:  CT LUMBAR SPINE   ORDERED BY: MELVIN JUAREZ     ACC: [id number] EXAM:  CT THORACIC SPINE   ORDERED BY: MELVIN JUAREZ     PROCEDURE DATE:  03/03/2024          INTERPRETATION:  CT OF THE THORACIC AND LUMBAR SPINE WITHOUT CONTRAST    CLINICAL INFORMATION:    TECHNIQUE: CT of the thoracic and lumbar spine was performed utilizing   helically obtained axial images. Images were reformatted into coronal and   sagittal orientation.    PRIOR STUDIES: CT Lumbar Spine 4/19/2022 and thoracic spine radiographs   4/16/2013    FINDINGS:  Thoracic spine:  No prior CTs of the thoracic spine available for comparison. Diffuse   osteoporosis. Kyphosis is preserved. No subluxation. Spinous processes   are intact.    There is a severe chronic appearing compression deformity at T3. No   significant bony retropulsion. There is a moderate compression deformity   at T7. No bony retropulsion appreciated. Mild compression deformity at   T9. No significant bony retropulsion. Chronic severe compression  deformity again noted at T12 and is unchanged from a study of 4/19/2022.    Mild thoracic canal stenosis at T9-10 secondary to disc bulge and mild   bony retropulsion from a mild compression deformity. No large disc   herniations appreciated throughout the thoracic canal.    A 7 mm peripherally located groundglass nodule at the right upper lobe.    Lumbar spine:  Straightening of lordosis. No subluxation. Diffuse osteoporosis.   Multilevel compression deformities.    Symmetric bilateral psoas muscles. No paravertebral mass. The dorsal soft   tissues unremarkable. No intrapelvic hematoma. Circumferential mild   involving the visualized abdominal aorta.    Increased compression deformity of L2 now moderate, previously mild. No   bony retropulsion at this level. There are again chronic appearing   compression deformities of the T12 and L1 vertebra with mild retropulsion   and moderate spinal canal stenosis at T12 and L1. Chronic mild to   moderate compression deformity at L3 and L4, unchanged.    Multilevel disc bulges without severe spinal canal stenosis. Moderate   spinal canal stenosis at L4-5 secondary to disc and ligamentous   hypertrophy. Moderate spinal stenosis at T12 and L1 in the setting mild   bony retropulsion secondary to severe compression deformities, unchanged.   Overall lumbar canal findings unchanged from a CT lumbar spine of   4/19/2022        IMPRESSION:  Since 4/19/2022, increased depression along the superior and inferior   endplate of L2 now demonstrating a moderate compression deformity,   previously mild compression deformity. Cannot exclude acute on chronic   compression deformity.    Chronic severe compression deformities again noted at T12 and L1 with   mild to moderate chronic compression deformity at L3 and L4, all   unchanged.    Multilevel thoracic compression deformities with severe deformity at T3   and T12, moderate deformity at T7 and mild deformity at T9. At T9 there   is moderate canal stenosis secondary to disc bulge and mild bony   retropulsion.          Review of Systems : per HPI         Vital Signs Last 24 Hrs  T(C): 36.4 (05 Mar 2024 09:16), Max: 36.6 (04 Mar 2024 15:22)  T(F): 97.6 (05 Mar 2024 09:16), Max: 97.9 (05 Mar 2024 05:58)  HR: 86 (05 Mar 2024 09:16) (76 - 86)  BP: 124/65 (05 Mar 2024 09:16) (120/74 - 130/85)  BP(mean): --  RR: 18 (05 Mar 2024 09:16) (18 - 18)  SpO2: 96% (05 Mar 2024 09:16) (96% - 100%)    Parameters below as of 05 Mar 2024 09:16  Patient On (Oxygen Delivery Method): room air            Physical Exam:   89 y o woman lying comfortably in semi Martin's position , awake , alert , no new complaints     Head: normocephalic , atraumatic    Eyes: PERRLA , EOMI , no nystagmus , sclera anicteric    ENT / FACE: neg nasal discharge , uvula midline , no oropharyngeal erythema / exudate    Neck: supple , negative JVD , negative carotid bruits , no thyromegaly    Chest: CTA bilaterally , neg wheeze / rhonchi / rales / crackles / egophany    Cardiovascular: regular rate and rhythm , neg murmurs / rubs / gallops    Abdomen: soft , non distended , non tender to palpation in all 4 quadrants ,  normal bowel sounds     Extremities: WWP , neg cyanosis /clubbing / edema      Musculoskeletal:  T9-L4 TTP . neg SLR     Neurologic Exam:     Alert and oriented to person , place        Motor Exam:        > 3+/5 x 4 extremities        Sensation:         intact to light touch x 4 extremities       DTR:           biceps/brachioradialis: equal                            patella/ankle: equal             neg clonus           Gait:  not tested          PM&R Impression: admitted for c/o back pain 2/2 presumed fall , Th/L compression fx's     - acute anemia    - deconditioned       Recommendations / Plan:       1) Physical / Occupational therapy focusing on therapeutic exercises , equipment evaluation , bed mobility/transfer out of bed evaluation , progressive ambulation with assistive devices prn .    2) Current disposition plan recommendation:       - d/c home with home physical therapy     - resume 24/7 A

## 2024-03-05 NOTE — PHYSICAL THERAPY INITIAL EVALUATION ADULT - PERTINENT HX OF CURRENT PROBLEM, REHAB EVAL
89yo F w/ PMHx Breast Cx s/p R mastectomy, urinary retention, memory loss - p/w 1wk mid and lower back pain, found w/ thoracolumbar compression fractures likely iso prior falls, & acute anemia w/ unclear etiology, s/p 1u pRBC. Admitted for further mgmt.

## 2024-03-05 NOTE — PROGRESS NOTE ADULT - ATTENDING COMMENTS
Pt is 89 yo woman with chronic back pain, NSAID use at home, admitted with newly decreased hemaglobin by 50%, with elevated Bun/Creat, consistent with acute UGI bleeding. Treated conservatively with PPI BID. Will follow for stability of HgB post transfusion and if stable pt might not need EGD. Counseled to avoid NSAIDS in the future. Tylenol for back pain. Pt has assistance at home.

## 2024-03-05 NOTE — PROGRESS NOTE ADULT - PROBLEM SELECTOR PLAN 3
Hx constipation, uses miralax at home occasionally, on opioids for breakthrough pain on admission.     - Miralax BID, senna 2 tabs qhs.
Hx constipation, uses miralax at home occasionally, on opioids for breakthrough pain on admission.     - Miralax BID, senna 2 tabs qhs.

## 2024-03-05 NOTE — CHART NOTE - NSCHARTNOTEFT_GEN_A_CORE
Patient seen at bedside. Reports she does not enjoy the hospital food but was eating well PTA. Reports her weight has been stable. Endorsed some nausea for a few weeks. Reports she typically consumes a vegetarian diet. Provided nutrition education discussing importance of adequate protein, food sources of protein. Patient appreciative, verbalized understanding. RD to remain available.

## 2024-03-05 NOTE — PROGRESS NOTE ADULT - PROBLEM SELECTOR PLAN 2
Presenting w/ back pain since last week, severe dull 10/10 ache. Saw Dr. Stevens (ortho) outpatient, received injections into spine w/ some minimal pain relief.  Was planned for MRI on 3/7 w/ Dr Stevens  CT Thoracic and Lumbar Spine: Since 4/19/2022, increased depression along the superior and inferior endplate of L2 now w/ moderate compression deformity, previously mild compression deformity. Cannot exclude acute on chronic compression deformity. Chronic severe compression deformities again noted at T12 and L1 with   mild to moderate chronic compression deformity at L3 and L4, all unchanged. Multilevel thoracic compression deformities with severe deformity at T3 and T12, moderate deformity at T7 and mild deformity at T9. At T9 there is moderate canal stenosis secondary to disc bulge and mild bony retropulsion.    Compression fractures likely iso hx multiple falls   Ortho: wbat for PT  PLAN:   - f/u ortho recs  - f/u MRI Thoracic/ Lumbar   - Will continue multimodal pain control with Tylenol 975mg, ice packs, lidocaine patches, and Oxycodone 2.5mg PRN   - Fall precautions  - PT consult/ OT consult

## 2024-03-05 NOTE — CONSULT NOTE ADULT - ASSESSMENT
{\rtf1\axqdfx06088\ansi\hnwkezr8804\ftnbj\uc1\deff0  {\fonttbl{\f0 \fnil Segoe UI;}{\f1 \fnil \fcharset0 Segoe UI;}{\f2 \fnil Times New Vasyl;}}  {\colortbl ;\eeo608\pelft041\rmwh932 ;\red0\green0\blue0 ;\red0\green0\fgrb434 ;\red0\green0\blue0 ;}  {\stylesheet{\f0\fs20 Normal;}{\cs1 Default Paragraph Font;}{\cs2\f0\fs16 Line Number;}{\cs3\f2\fs24\ul\cf3 Hyperlink;}}  {\*\revtbl{Unknown;}}  \xasijq76786\bkvbgb72583\odnmf7039\yyckf9035\viifa5402\khnue9932\pkmmfwm386\uruizzz674\nogrowautofit\zofgem513\formshade\nofeaturethrottle1\dntblnsbdb\fet4\aendnotes\aftnnrlc\pgbrdrhead\pgbrdrfoot  \sectd\wuukeu82241\iswtwa88335\guttersxn0\evitqatn6764\dalprvki1419\tdlalabk7037\yuthawos9814\cvxlqpe532\hdzsnso729\sbkpage\pgncont\pgndec  \plain\plain\f0\fs24\ql\plain\f0\fs24\plain\f0\fs20\uajt5514\hich\f0\dbch\f0\loch\f0\fs20\par  I M\par  \par  89 y o F w/ PMHx Breast Cx s/p R mastectomy, urinary retention, memory loss - p/w 1wk mid and lower back pain, found w/ thoracolumbar compression fractures likely iso prior falls, & acute anemia w/ unclear etiology, s/p 1u pRBC. Admitted for further mgmt.     \par  \par  \plain\f1\fs20\uocb8233\hich\f1\dbch\f1\loch\f1\cf2\fs20\ul{\field{\*\fldinst HYPERLINK [id number] }{\fldrslt Problem/Plan - 1:}}\plain\f0\fs20\wqot4283\hich\f0\dbch\f0\loch\f0\fs20\ql\par  \'b7  {\*\bkmkstart [id number]}{\*\bkmkend ko13698117220}Problem: {\*\bkmkstart [id number]}{\*\bkmkend vx01966367021}Lumbar compression fracture.\plain\f1\fs20\nkvb1574\hich\f1\dbch\f1\loch\f1\cf2\fs20\strike\plain\f0\fs20\pmao8110\hich\f0\dbch\f0\loch\f0\fs20\par  \'b7  {\*\bkmkstart [id number]}{\*\bkmkend gm03099310074}Plan: {\*\bkmkstart [id number]}{\*\bkmkend [id number]}Presenting w/ back pain since last week, severe dull 10/10 ache. Saw Dr. Stevens (ortho) outpatient, received injections into spine w/   some minimal pain relief.  Was planned for MRI on 3/7 w/ Dr Stevens\par  CT Thoracic and Lumbar Spine: Since 4/19/2022, increased depression along the superior and inferior endplate of L2 now w/ moderate compression deformity, previously mild compression deformity. Cannot exclude acute on chronic compression deformity. Chronic   severe compression deformities again noted at T12 and L1 with \par  mild to moderate chronic compression deformity at L3 and L4, all unchanged. Multilevel thoracic compression deformities with severe deformity at T3 and T12, moderate deformity at T7 and mild deformity at T9. At T9 there is moderate canal stenosis secondary   to disc bulge and mild bony retropulsion.\par  \par  Compression fractures likely iso hx multiple falls \par  \par  PLAN: \par  - consult ortho-spine, f/u recs\par  - f/u MRI Thoracic lumbar \par  - Pain mgmt w/ Acetaminophen 650 q6h standing, Tramadol 50mg q6h for mod pain, PO Morphine 15mg q6h for severe pain\par  - Fall precautions\par  - Consider PT consult if appropriate, if able to ambulate.\plain\f1\fs20\wmgt2757\hich\f1\dbch\f1\loch\f1\cf2\fs20\strike\plain\f0\fs20\gjrp5260\hich\f0\dbch\f0\loch\f0\fs20\par  \par  \plain\f1\fs20\zsrn0507\hich\f1\dbch\f1\loch\f1\cf2\fs20\ul{\field{\*\fldinst HYPERLINK [id number] }{\fldrslt Problem/Plan - 2:}}\plain\f0\fs20\bejt3517\hich\f0\dbch\f0\loch\f0\fs20\ql\par  \'b7  {\*\bkmkstart [id number]}{\*\bkmkend nv69575695379}Problem: {\*\bkmkstart ha49912032466}{\*\bkmkend qw11203352585}Acute blood loss anemia (ABLA).\plain\f1\fs20\poss1574\hich\f1\dbch\f1\loch\f1\cf2\fs20\strike\plain\f0\fs20\ayvz2768\hich\f0\dbch\f0\loch\f0\fs20\par  \'b7  {\*\bkmkstart jg01634898805}{\*\bkmkend yu22976317818}Plan: {\*\bkmkstart nd82763016967}{\*\bkmkend rq70461483941}Suspect 2/2 UGIB - in setting of NSAID use\par  P/w Hb 6.7 (bl ~11-12) Hcrit 18.7 now s/p 1u pRBC. . Plt 344. Acute drop from Hb 11-12, last Hb 11.8 on 1/27/24. BUN elevated to 43 w/o elevation in Cr, possible ?GI Bleed. \par  No obvious s/sx bleeding. P/w severe back pain, however CTAP Angio w/o active hemorrhage: RP bleed or intraabdominal bleeding r/o'd. No e/o hemolysis, TBili wnl. \par  Last C-scope 15yrs ago, normal findings. No hx endoscopy, no known hx gastric ulcers. Denies any recent melena/hematochezia or BRBPR. No excessive NSAID use. MAURICIO in ED neg. No n/v or hematemesis. Pt appears to be reliable historian \par  Very unclear etiology re: acute drop in Hb. \par  \par  PLAN: \par  - GI Consult given significant acute drop in Hb\par  - Start protonix 40mg IVP q12h\par  - f/u post-transfusion CBC\par  - f/u add-on LDH, reticulocyte count, haptoglobin\par  - f/u iron panel, B12, folate, + coags (could not add on to pre-transfusion labs)\par  - maintain active T&S, transfuse Hb>7.\plain\f1\fs20\fsvl3039\hich\f1\dbch\f1\loch\f1\cf2\fs20\strike\plain\f0\fs20\zbds5711\hich\f0\dbch\f0\loch\f0\fs20\par  \par  \plain\f1\fs20\wyls3427\hich\f1\dbch\f1\loch\f1\cf2\fs20\ul{\field{\*\fldinst HYPERLINK [id number] }{\fldrslt Problem/Plan - 3:}}\plain\f0\fs20\aofi6931\hich\f0\dbch\f0\loch\f0\fs20\ql\par  \'b7  {\*\bkmkstart [id number]}{\*\bkmkend gr23718593067}Problem: {\*\bkmkstart il96512855084}{\*\bkmkend nl95189742318}Constipation. \par  \'b7  {\*\bkmkstart td73658034873}{\*\bkmkend ny21503446784}Plan: {\*\bkmkstart zp99215547525}{\*\bkmkend ga90849400727}Hx constipation, uses miralax at home occasionally\par  - Miralax BID, senna 2 tabs qhs.\par  \par  \plain\f1\fs20\xkfg9727\hich\f1\dbch\f1\loch\f1\cf2\fs20\ul{\field{\*\fldinst HYPERLINK [id number] }{\fldrslt Problem/Plan - 4:}}\plain\f0\fs20\cuyr3396\hich\f0\dbch\f0\loch\f0\fs20\ql\par  \'b7  {\*\bkmkstart vk11571017438}{\*\bkmkend hs84942607812}Problem: {\*\bkmkstart jg25472401057}{\*\bkmkend ko72691013984}Preventive measure. \par  \'b7  {\*\bkmkstart hr49655846039}{\*\bkmkend xo56105572280}Plan: {\*\bkmkstart rk61006112473}{\*\bkmkend ab63126631238}F: None\par  E: Replete PRN\par  N: Regular diet\par  P: Holding chemical ppx given anemia. +SCDs.\par  \par  {\*\bkmkstart bk62262312535}{\*\bkmkend xl60287721061}\par  \plain\f1\fs20\xoaf8486\hich\f1\dbch\f1\loch\f1\cf2\fs20\ul{\field{\*\fldinst HYPERLINK [id number] }{\fldrslt Attestation Statements:}}\plain\f0\fs20\uajt9375\hich\f0\dbch\f0\loch\f0\fs20\ql\par  {\*\bkmkstart xh419994597775}{\*\bkmkend rk653549541909}I have personally seen and examined the patient.  I fully participated in the care of this patient.  I have made amendments to the documentation where necessary, and agree with the history, physical   exam, and plan as documented by the {\*\bkmkstart [id number]}{\*\bkmkend [id number]}Resident. \par  \par  {\*\bkmkstart cw211723805062}{\*\bkmkend aw147733791043}Patient was seen and examined at bedside on 3/4/2024 at 130 pm. Patient reports pain at her back with any movement. Feels well. Reports continued SOB. Denies CP, N/V. ROS is otherwise negative. Vitals,   labwork, pertinent imaging reviewed. Exam - NAD, AAO x 4, PERRLA, EOMI, MMM, supple neck, chest - CTA b/l, CV - rrr, s1s2, no m/r/g, abd - soft, NTND, + BS, ext - wwp  psych - normal affect\par  \par  Plan:\par  -Pain control - pt refusing Lidocaine patch, c/w standing Tylenol ice packs, limit narcotics\par  -Ortho rec MRI\par  -Aggressive bowel regimen\par  -PVR\par  -Nutrition consult\par  -PM&R consult\par  -PT/OT\par  -GI consulted but patient refusing EGD {\*\bkmkstart bkcommentCR}{\*\bkmkend bkcommentCR}.\par  \plain\f1\fs16\lega4949\hich\f1\dbch\f1\loch\f1\cf2\fs16\par  \plain\f0\fs20\afre8787\hich\f0\dbch\f0\loch\f0\fs20\par  }

## 2024-03-05 NOTE — DISCHARGE NOTE NURSING/CASE MANAGEMENT/SOCIAL WORK - NSDCPEFALRISK_GEN_ALL_CORE
For information on Fall & Injury Prevention, visit: [email or web address] OR  [email or web address] OR  https://www.cdc.gov/steadi/patient.html

## 2024-03-05 NOTE — PROGRESS NOTE ADULT - SUBJECTIVE AND OBJECTIVE BOX
OVERNIGHT EVENTS: CHAYITO QUEVEDO  Pt was seen and examined at bedside. Reports pain in back in waves, severe pain. Reports morphine has been helpful. Reports that she has been taking Alleve 2 pills PRN for her back pain as well. Denies any fevers/ chills, n/v, headache, acute SOB, chest pain, abdominal pain, genitourinary sx    Vital Signs Last 24 Hrs  T(C): 36.6 (04 Mar 2024 08:45), Max: 36.9 (03 Mar 2024 22:11)  T(F): 97.8 (04 Mar 2024 08:45), Max: 98.5 (03 Mar 2024 22:11)  HR: 79 (04 Mar 2024 08:45) (65 - 92)  BP: 129/65 (04 Mar 2024 08:45) (117/53 - 149/72)  BP(mean): --  RR: 18 (04 Mar 2024 08:45) (16 - 18)  SpO2: 95% (04 Mar 2024 08:45) (95% - 100%)    Parameters below as of 04 Mar 2024 08:45  Patient On (Oxygen Delivery Method): room air    PHYSICAL EXAM     General: NAD  HEENT: NC/AT; PERRL; Neck Supple; MMM  Respiratory: CTAB; no wheezes/rales/rhonchi, normal WOB  Cardiovascular: Regular rhythm/ rate, + S1/S2; no murmurs, rubs gallops   Gastrointestinal: Soft; NTND; bowel sounds normal and present  : no suprapubic/ CVA tenderness  Vascular: extremities WWP; no edema/cyanosis, 2+ distal pulses b/l   MSK: strength 5/5 b/l RUE and LUE at hands, wrist, shoulder. 5/5 b/l LE at hip and ankle with dorsi and plantarflexion.   Neurological: A&Ox3, no obvious focal deficits  Integument: 2x3cm bruise on left shin, otherwise unremarkable      LABS:                         7.9    10.04 )-----------( 317      ( 04 Mar 2024 11:22 )             24.2     03-04    137  |  100  |  x   ----------------------------<  x   3.9   |  x   |  x     Ca    8.8      04 Mar 2024 05:30  Phos  3.2     03-04  Mg     2.3     03-04    TPro  5.8<L>  /  Alb  3.3  /  TBili  0.6  /  DBili  x   /  AST  19  /  ALT  10  /  AlkPhos  68  03-04    PT/INR - ( 04 Mar 2024 11:22 )   PT: 11.1 sec;   INR: 0.97          PTT - ( 04 Mar 2024 11:22 )  PTT:28.0 sec  Urinalysis Basic - ( 04 Mar 2024 05:30 )    Color: x / Appearance: x / SG: x / pH: x  Gluc: 103 mg/dL / Ketone: x  / Bili: x / Urobili: x   Blood: x / Protein: x / Nitrite: x   Leuk Esterase: x / RBC: x / WBC x   Sq Epi: x / Non Sq Epi: x / Bacteria: x                RADIOLOGY, EKG & ADDITIONAL TESTS: Reviewed. 
OVERNIGHT EVENTS: CHAYITO QUEVEDO  Pt was seen and examined at bedside. Still reports a lot of pain, and not being well controlled on current medications.   Patient denies any fevers/ chills, n/v, headache, acute SOB, chest pain, abdominal pain, genitourinary sx.     Vital Signs Last 24 Hrs  T(C): 36.4 (05 Mar 2024 09:16), Max: 36.6 (04 Mar 2024 15:22)  T(F): 97.6 (05 Mar 2024 09:16), Max: 97.9 (05 Mar 2024 05:58)  HR: 86 (05 Mar 2024 09:16) (76 - 86)  BP: 124/65 (05 Mar 2024 09:16) (120/74 - 130/85)  BP(mean): --  RR: 18 (05 Mar 2024 09:16) (18 - 18)  SpO2: 96% (05 Mar 2024 09:16) (96% - 100%)    Parameters below as of 05 Mar 2024 09:16  Patient On (Oxygen Delivery Method): room air    PHYSICAL EXAM     General: In pain, moderately distressed  HEENT: NC/AT; PERRL; Neck Supple; MMM  Respiratory: CTAB; no wheezes/rales/rhonchi, normal WOB  Cardiovascular: Irregular rhythm/rate, + S1/S2; no murmurs, rubs gallops   Gastrointestinal: Soft; NTND; bowel sounds normal and present, mildly tense abdomen with no guarding or rebound tenderness  : no suprapubic tenderness  Vascular: extremities WWP; no edema/cyanosis, 2+ distal pulses b/l   MSK: 5/5 strength b/l UE and LE at deltoid, elbow, hip, and ankle  Neurological: A&Ox3, no obvious focal deficits  Integument: No gross skin abnormalities or rashes     LABS:                         7.4    9.34  )-----------( 335      ( 05 Mar 2024 05:30 )             22.8     03-05    134<L>  |  100  |  32<H>  ----------------------------<  99  3.8   |  27  |  0.81    Ca    8.0<L>      05 Mar 2024 05:30  Phos  3.9     03-05  Mg     2.2     03-05    TPro  5.4<L>  /  Alb  3.1<L>  /  TBili  0.4  /  DBili  x   /  AST  14  /  ALT  8<L>  /  AlkPhos  63  03-05    PT/INR - ( 04 Mar 2024 11:22 )   PT: 11.1 sec;   INR: 0.97          PTT - ( 04 Mar 2024 11:22 )  PTT:28.0 sec  Urinalysis Basic - ( 05 Mar 2024 05:30 )    Color: x / Appearance: x / SG: x / pH: x  Gluc: 99 mg/dL / Ketone: x  / Bili: x / Urobili: x   Blood: x / Protein: x / Nitrite: x   Leuk Esterase: x / RBC: x / WBC x   Sq Epi: x / Non Sq Epi: x / Bacteria: x                RADIOLOGY, EKG & ADDITIONAL TESTS: Reviewed.

## 2024-03-05 NOTE — DISCHARGE NOTE NURSING/CASE MANAGEMENT/SOCIAL WORK - PATIENT PORTAL LINK FT
You can access the FollowMyHealth Patient Portal offered by [facility] by registering at the following website: [email or web address]. By joining [employer]’s FollowMyHealth portal, you will also be able to view your health information using other applications (apps) compatible with our system.

## 2024-03-07 ENCOUNTER — APPOINTMENT (OUTPATIENT)
Dept: MRI IMAGING | Facility: HOSPITAL | Age: 89
End: 2024-03-07

## 2024-03-07 ENCOUNTER — OUTPATIENT (OUTPATIENT)
Dept: OUTPATIENT SERVICES | Facility: HOSPITAL | Age: 89
LOS: 1 days | End: 2024-03-07
Payer: MEDICARE

## 2024-03-07 PROCEDURE — 72148 MRI LUMBAR SPINE W/O DYE: CPT | Mod: 26,MH

## 2024-03-07 PROCEDURE — 72148 MRI LUMBAR SPINE W/O DYE: CPT

## 2024-03-07 PROCEDURE — 72146 MRI CHEST SPINE W/O DYE: CPT | Mod: 26,MH

## 2024-03-07 PROCEDURE — 72146 MRI CHEST SPINE W/O DYE: CPT

## 2024-03-11 ENCOUNTER — APPOINTMENT (OUTPATIENT)
Dept: ORTHOPEDIC SURGERY | Facility: CLINIC | Age: 89
End: 2024-03-11

## 2024-03-12 ENCOUNTER — APPOINTMENT (OUTPATIENT)
Dept: PAIN MANAGEMENT | Facility: CLINIC | Age: 89
End: 2024-03-12
Payer: MEDICARE

## 2024-03-12 VITALS
WEIGHT: 128 LBS | BODY MASS INDEX: 23.55 KG/M2 | OXYGEN SATURATION: 98 % | HEART RATE: 104 BPM | SYSTOLIC BLOOD PRESSURE: 117 MMHG | HEIGHT: 62 IN | DIASTOLIC BLOOD PRESSURE: 71 MMHG

## 2024-03-12 DIAGNOSIS — D62 ACUTE POSTHEMORRHAGIC ANEMIA: ICD-10-CM

## 2024-03-12 DIAGNOSIS — K92.2 GASTROINTESTINAL HEMORRHAGE, UNSPECIFIED: ICD-10-CM

## 2024-03-12 DIAGNOSIS — M84.68XA PATHOLOGICAL FRACTURE IN OTHER DISEASE, OTHER SITE, INITIAL ENCOUNTER FOR FRACTURE: ICD-10-CM

## 2024-03-12 DIAGNOSIS — D53.9 NUTRITIONAL ANEMIA, UNSPECIFIED: ICD-10-CM

## 2024-03-12 DIAGNOSIS — Z85.3 PERSONAL HISTORY OF MALIGNANT NEOPLASM OF BREAST: ICD-10-CM

## 2024-03-12 DIAGNOSIS — R29.6 REPEATED FALLS: ICD-10-CM

## 2024-03-12 DIAGNOSIS — T39.395A ADVERSE EFFECT OF OTHER NONSTEROIDAL ANTI-INFLAMMATORY DRUGS [NSAID], INITIAL ENCOUNTER: ICD-10-CM

## 2024-03-12 DIAGNOSIS — R33.9 RETENTION OF URINE, UNSPECIFIED: ICD-10-CM

## 2024-03-12 DIAGNOSIS — K59.00 CONSTIPATION, UNSPECIFIED: ICD-10-CM

## 2024-03-12 DIAGNOSIS — M81.0 AGE-RELATED OSTEOPOROSIS WITHOUT CURRENT PATHOLOGICAL FRACTURE: ICD-10-CM

## 2024-03-12 DIAGNOSIS — Z90.11 ACQUIRED ABSENCE OF RIGHT BREAST AND NIPPLE: ICD-10-CM

## 2024-03-12 PROCEDURE — 99214 OFFICE O/P EST MOD 30 MIN: CPT

## 2024-03-12 RX ORDER — TRAMADOL HYDROCHLORIDE AND ACETAMINOPHEN 37.5; 325 MG/1; MG/1
37.5-325 TABLET, FILM COATED ORAL
Qty: 42 | Refills: 0 | Status: ACTIVE | COMMUNITY
Start: 2024-03-01 | End: 1900-01-01

## 2024-03-12 RX ORDER — LACTULOSE 10 G/10G
10 SOLUTION ORAL DAILY
Qty: 10 | Refills: 0 | Status: ACTIVE | COMMUNITY
Start: 2024-03-12 | End: 1900-01-01

## 2024-03-12 RX ORDER — BUPRENORPHINE 7.5 UG/H
7.5 PATCH, EXTENDED RELEASE TRANSDERMAL
Qty: 28 | Refills: 0 | Status: ACTIVE | COMMUNITY
Start: 2024-03-12 | End: 1900-01-01

## 2024-03-13 ENCOUNTER — EMERGENCY (EMERGENCY)
Facility: HOSPITAL | Age: 89
LOS: 1 days | Discharge: HOME | End: 2024-03-13
Attending: EMERGENCY MEDICINE | Admitting: EMERGENCY MEDICINE
Payer: MEDICARE

## 2024-03-13 VITALS
TEMPERATURE: 98 F | RESPIRATION RATE: 18 BRPM | HEART RATE: 92 BPM | SYSTOLIC BLOOD PRESSURE: 153 MMHG | OXYGEN SATURATION: 97 % | DIASTOLIC BLOOD PRESSURE: 76 MMHG

## 2024-03-13 VITALS
DIASTOLIC BLOOD PRESSURE: 88 MMHG | RESPIRATION RATE: 20 BRPM | OXYGEN SATURATION: 98 % | HEART RATE: 61 BPM | TEMPERATURE: 98 F | WEIGHT: 119.93 LBS | SYSTOLIC BLOOD PRESSURE: 150 MMHG | HEIGHT: 62 IN

## 2024-03-13 DIAGNOSIS — R14.3 FLATULENCE: ICD-10-CM

## 2024-03-13 DIAGNOSIS — Z91.148 PATIENT'S OTHER NONCOMPLIANCE WITH MEDICATION REGIMEN FOR OTHER REASON: ICD-10-CM

## 2024-03-13 DIAGNOSIS — T40.426A: ICD-10-CM

## 2024-03-13 DIAGNOSIS — R10.9 UNSPECIFIED ABDOMINAL PAIN: ICD-10-CM

## 2024-03-13 DIAGNOSIS — Z74.01 BED CONFINEMENT STATUS: ICD-10-CM

## 2024-03-13 DIAGNOSIS — M54.50 LOW BACK PAIN, UNSPECIFIED: ICD-10-CM

## 2024-03-13 DIAGNOSIS — Z87.39 PERSONAL HISTORY OF OTHER DISEASES OF THE MUSCULOSKELETAL SYSTEM AND CONNECTIVE TISSUE: ICD-10-CM

## 2024-03-13 DIAGNOSIS — K59.00 CONSTIPATION, UNSPECIFIED: ICD-10-CM

## 2024-03-13 DIAGNOSIS — R11.10 VOMITING, UNSPECIFIED: ICD-10-CM

## 2024-03-13 DIAGNOSIS — M19.90 UNSPECIFIED OSTEOARTHRITIS, UNSPECIFIED SITE: ICD-10-CM

## 2024-03-13 LAB
ALBUMIN SERPL ELPH-MCNC: 3.5 G/DL — SIGNIFICANT CHANGE UP (ref 3.3–5)
ALP SERPL-CCNC: 86 U/L — SIGNIFICANT CHANGE UP (ref 40–120)
ALT FLD-CCNC: 10 U/L — SIGNIFICANT CHANGE UP (ref 10–45)
ANION GAP SERPL CALC-SCNC: 9 MMOL/L — SIGNIFICANT CHANGE UP (ref 5–17)
ANISOCYTOSIS BLD QL: SIGNIFICANT CHANGE UP
AST SERPL-CCNC: 21 U/L — SIGNIFICANT CHANGE UP (ref 10–40)
BASOPHILS # BLD AUTO: 0 K/UL — SIGNIFICANT CHANGE UP (ref 0–0.2)
BASOPHILS NFR BLD AUTO: 0 % — SIGNIFICANT CHANGE UP (ref 0–2)
BILIRUB SERPL-MCNC: 0.4 MG/DL — SIGNIFICANT CHANGE UP (ref 0.2–1.2)
BUN SERPL-MCNC: 19 MG/DL — SIGNIFICANT CHANGE UP (ref 7–23)
CALCIUM SERPL-MCNC: 8.7 MG/DL — SIGNIFICANT CHANGE UP (ref 8.4–10.5)
CHLORIDE SERPL-SCNC: 96 MMOL/L — SIGNIFICANT CHANGE UP (ref 96–108)
CO2 SERPL-SCNC: 27 MMOL/L — SIGNIFICANT CHANGE UP (ref 22–31)
CREAT SERPL-MCNC: 0.7 MG/DL — SIGNIFICANT CHANGE UP (ref 0.5–1.3)
EGFR: 81 ML/MIN/1.73M2 — SIGNIFICANT CHANGE UP
EOSINOPHIL # BLD AUTO: 0.1 K/UL — SIGNIFICANT CHANGE UP (ref 0–0.5)
EOSINOPHIL NFR BLD AUTO: 0.9 % — SIGNIFICANT CHANGE UP (ref 0–6)
GIANT PLATELETS BLD QL SMEAR: PRESENT — SIGNIFICANT CHANGE UP
GLUCOSE SERPL-MCNC: 115 MG/DL — HIGH (ref 70–99)
HCT VFR BLD CALC: 25.4 % — LOW (ref 34.5–45)
HGB BLD-MCNC: 8.3 G/DL — LOW (ref 11.5–15.5)
HYPOCHROMIA BLD QL: SLIGHT — SIGNIFICANT CHANGE UP
LIDOCAIN IGE QN: 19 U/L — SIGNIFICANT CHANGE UP (ref 7–60)
LYMPHOCYTES # BLD AUTO: 0.74 K/UL — LOW (ref 1–3.3)
LYMPHOCYTES # BLD AUTO: 7 % — LOW (ref 13–44)
MACROCYTES BLD QL: SIGNIFICANT CHANGE UP
MANUAL SMEAR VERIFICATION: SIGNIFICANT CHANGE UP
MCHC RBC-ENTMCNC: 32.7 GM/DL — SIGNIFICANT CHANGE UP (ref 32–36)
MCHC RBC-ENTMCNC: 32.8 PG — SIGNIFICANT CHANGE UP (ref 27–34)
MCV RBC AUTO: 100.4 FL — HIGH (ref 80–100)
MONOCYTES # BLD AUTO: 0.55 K/UL — SIGNIFICANT CHANGE UP (ref 0–0.9)
MONOCYTES NFR BLD AUTO: 5.2 % — SIGNIFICANT CHANGE UP (ref 2–14)
NEUTROPHILS # BLD AUTO: 9.1 K/UL — HIGH (ref 1.8–7.4)
NEUTROPHILS NFR BLD AUTO: 86 % — HIGH (ref 43–77)
OVALOCYTES BLD QL SMEAR: SLIGHT — SIGNIFICANT CHANGE UP
PLAT MORPH BLD: ABNORMAL
PLATELET # BLD AUTO: 554 K/UL — HIGH (ref 150–400)
POIKILOCYTOSIS BLD QL AUTO: SLIGHT — SIGNIFICANT CHANGE UP
POLYCHROMASIA BLD QL SMEAR: SLIGHT — SIGNIFICANT CHANGE UP
POTASSIUM SERPL-MCNC: 3.5 MMOL/L — SIGNIFICANT CHANGE UP (ref 3.5–5.3)
POTASSIUM SERPL-SCNC: 3.5 MMOL/L — SIGNIFICANT CHANGE UP (ref 3.5–5.3)
PROT SERPL-MCNC: 6.4 G/DL — SIGNIFICANT CHANGE UP (ref 6–8.3)
RBC # BLD: 2.53 M/UL — LOW (ref 3.8–5.2)
RBC # FLD: 20.2 % — HIGH (ref 10.3–14.5)
RBC BLD AUTO: ABNORMAL
SODIUM SERPL-SCNC: 132 MMOL/L — LOW (ref 135–145)
TARGETS BLD QL SMEAR: SLIGHT — SIGNIFICANT CHANGE UP
VARIANT LYMPHS # BLD: 0.9 % — SIGNIFICANT CHANGE UP (ref 0–6)
WBC # BLD: 10.58 K/UL — HIGH (ref 3.8–10.5)
WBC # FLD AUTO: 10.58 K/UL — HIGH (ref 3.8–10.5)

## 2024-03-13 PROCEDURE — 74019 RADEX ABDOMEN 2 VIEWS: CPT

## 2024-03-13 PROCEDURE — 99283 EMERGENCY DEPT VISIT LOW MDM: CPT | Mod: 25

## 2024-03-13 PROCEDURE — 85025 COMPLETE CBC W/AUTO DIFF WBC: CPT

## 2024-03-13 PROCEDURE — 99285 EMERGENCY DEPT VISIT HI MDM: CPT | Mod: FS

## 2024-03-13 PROCEDURE — 74019 RADEX ABDOMEN 2 VIEWS: CPT | Mod: 26

## 2024-03-13 PROCEDURE — 80053 COMPREHEN METABOLIC PANEL: CPT

## 2024-03-13 PROCEDURE — 36415 COLL VENOUS BLD VENIPUNCTURE: CPT

## 2024-03-13 PROCEDURE — 83690 ASSAY OF LIPASE: CPT

## 2024-03-13 RX ORDER — LACTULOSE 10 G/15ML
10 SOLUTION ORAL ONCE
Refills: 0 | Status: COMPLETED | OUTPATIENT
Start: 2024-03-13 | End: 2024-03-13

## 2024-03-13 RX ORDER — TRAMADOL HYDROCHLORIDE 50 MG/1
50 TABLET ORAL ONCE
Refills: 0 | Status: DISCONTINUED | OUTPATIENT
Start: 2024-03-13 | End: 2024-03-13

## 2024-03-13 RX ORDER — BUPRENORPHINE 10 UG/H
1 PATCH, EXTENDED RELEASE TRANSDERMAL ONCE
Refills: 0 | Status: DISCONTINUED | OUTPATIENT
Start: 2024-03-13 | End: 2024-03-13

## 2024-03-13 RX ADMIN — LACTULOSE 10 GRAM(S): 10 SOLUTION ORAL at 09:46

## 2024-03-13 RX ADMIN — TRAMADOL HYDROCHLORIDE 50 MILLIGRAM(S): 50 TABLET ORAL at 09:46

## 2024-03-13 RX ADMIN — TRAMADOL HYDROCHLORIDE 50 MILLIGRAM(S): 50 TABLET ORAL at 10:46

## 2024-03-13 NOTE — ED ADULT TRIAGE NOTE - CHIEF COMPLAINT QUOTE
Pt presents to ED here for low back pain, abd pain and constipation x days. Pt A&Ox4, conversive in full sentences and walk with a walker at home. Pt denies CP, SOB, fever, hills, n/v/d. Pt presents to ED here for low back pain, abd pain and constipation x days. Pt A&Ox4, conversive in full sentences and walk with a walker at home. Pt denies CP, SOB, fever, chills, n/v/d.

## 2024-03-13 NOTE — ED ADULT NURSE NOTE - CHIEF COMPLAINT QUOTE
Pt presents to ED here for low back pain, abd pain and constipation x days. Pt A&Ox4, conversive in full sentences and walk with a walker at home. Pt denies CP, SOB, fever, chills, n/v/d.

## 2024-03-13 NOTE — ED ADULT NURSE REASSESSMENT NOTE - NS ED NURSE REASSESS COMMENT FT1
Pt was assisted to the bathroom with 2 RNs, pt HHA present and was told by RN to stay with pt and to ask for RN for help when pt expressed she was done. RN checked on pt and pt stated she needed more time on the toilet. CHELO barrios went to check on pt and found that the HHA left the pt without telling anyone and pt was still in the bathroom. HHA was instructed again to stay with pt! 2 RNs went to assist pt back to bed, HHA provided minimal assistance when asked by RN for help with moving and transferring pt to bed. Pt reports she had small BM but mostly water. RN was unable to see amount of stool d/t HHA flushing toilet. Denies cp, sob.

## 2024-03-13 NOTE — ED ADULT NURSE NOTE - OBJECTIVE STATEMENT
89 y/o female pmhx chronic back pain, osteoporosis, breast ca. Presents to ED for multiple medical complaints, c/o lower back pain x2-3 weeks, constipation x3 days, vomiting with PO intake x3 days & decreased PO intake. Pt reports she has been noncompliant with medications d/t insurance problems, reports she is out of tramadol and has not been compliant with bowel regimen meds. Denies CP, SOB, HA, F/C, N/T, vision changes, dizziness, and any other complaints at this time. On Exam A&Ox4, RA, VSS, NAD. Ambulatory with rollator but pt reports she is mostly in bed. Respirations are spontaneous and unlabored, speaking in clear coherent sentences. Skin is warm and color appropriate for ethnicity.

## 2024-03-13 NOTE — ED PROVIDER NOTE - ATTENDING APP SHARED VISIT CONTRIBUTION OF CARE
89yo F w/ PMHx Breast Cx s/p R mastectomy, urinary retention, memory loss admitted 3/3-3/5 for back pain w/ thoracolumbar compression fractures (recommended for home PT) & acute anemia, (CTAP Angio w/o active hemorrhage: RP bleed or intraabdominal bleeding r/o'd. No e/o hemolysis, TBili wnl. Seen by GI, outpt f/u) c/o low back pain x 3 weeks who p/w persistent back pain and constipation x 5 days, +passing gas. On exam, VSS, abd soft, Nonsurgical, PA exam no impaction, +T/L spine ttp (known fractures), no focal neuro deficits. Plan for labs, pain control, enema, Xray abd.  Pt had a small BM after enema. Labs wnl. XR neg for air fluid levels. SW/case mgmt involved in case, pt set up for homecare tomorrow. Pts outpt SW involved will help with getting hospital bed delivered and meds. Pt stable for dc, has 24 hour HHA. Return precautions given.

## 2024-03-13 NOTE — DATA REVIEWED
[FreeTextEntry1] : MRI Thoracic and Lumbar Spine: - MRI thoracic spine: Mild progression of the moderate T9 compression fracture with bone marrow edema consistent with acute/subacute fracture with resultant retropulsion resulting in moderate spinal canal stenosis. - Chronic T3, T7 and T12 compression fractures. - MRI lumbar spine: Bone marrow edema in the severe L1 compression fracture consistent with acute/subacute component of the fracture. Again demonstrated is bony retropulsion which is unchanged from prior exam. - Chronic L2, L3 and L4 compression fractures without bone marrow edema. - Bone marrow edema in the L5 vertebral body without loss of height which may be due to contusion.  ========================================================================== Scoliosis films from yesterday personally reviewed with compression fractures noted as above.

## 2024-03-13 NOTE — ED PROVIDER NOTE - NSFOLLOWUPINSTRUCTIONS_ED_ALL_ED_FT
Take your senna daily at bedtime   Take miralax 17g twice daily  Take pain medications as directed by your pain management     A nurse will come to see you at home tomorrow     Can take tylenol 650mg AND/OR motrin 600mg (May cause stomach issues - take with food and avoid prolonged use) every 6hrs as needed for pain.    Follow up with primary doctor within 1-2 days.    Can take robaxin (methocarbamol) as prescribed as needed for pain/spasm.    Return to ER immediately for fevers, persistent vomit, uncontrolled pain, incontinence, focal weakness/numbness, worsening dizziness.     Follow up with spine specialist for persistent pain.   Can call [phone] ([phone]) to schedule appointment or go online [email or web address]    Back Pain    Back pain is very common in adults. The cause of back pain is rarely dangerous and the pain often gets better over time. The cause of your back pain may not be known and may include strain of muscles or ligaments, degeneration of the spinal disks, or arthritis. Occasionally the pain may radiate down your leg(s). Over-the-counter medicines to reduce pain and inflammation are often the most helpful. Stretching and remaining active frequently helps the healing process.     SEEK IMMEDIATE MEDICAL CARE IF YOU HAVE ANY OF THE FOLLOWING SYMPTOMS: bowel or bladder control problems, unusual weakness or numbness in your arms or legs, nausea or vomiting, abdominal pain, fever, dizziness/lightheadedness.

## 2024-03-13 NOTE — HISTORY OF PRESENT ILLNESS
[Back Pain] : back pain [___ wks] : [unfilled] week(s) ago [3] : an average pain level of 3/10 [1] : a minimum pain level of 1/10 [6] : a maximum pain level of 6/10 [Sharp] : sharp [Dull] : dull [Aching] : aching [Burning] : burning [Standing] : standing [Transitioning] : transitioning [Bending] : bending [Laying] : laying [Sitting] : sitting [FreeTextEntry1] : 89 year old female presents with worsening midline thoracic low back pain which has worsened since she was recently hospitalized for influenza and then discharged to subacute rehab. She has a known history of multiple compression fractures at T12, L3, L4. She has recently been evaluated by orthopedic spine surgery with new X-rays showing stable old fractures. She has been ordered a new lumbar and thoracic MRI which he has scheduled for March 7th. Her symptoms today are predominantly axial with mild radiation along her right ribs. She has tried Tylenol, Advil, lidocaine patches for pain control with minimal relief. Patient denies any new numbness, tingling, weakness, balance issues, bowel incontinence, bladder incontinence, or saddle anesthesia.  Follow up 3/12/2024: Patient presents today in a wheelchair and with updated thoracic and lumbar MRI images which show progression of T9 retropulsion and also acute/ subacute T1 compression fracture.  She continues to have pain radiating around her ribs bilaterally. She has been taking Tramadol - Acetaminophen (37.5mg- 325mg) 3 times a day but instead of taking the 1 tablet TID PRN, she's taking 2 tablets TID PRN (as a result she's run out of her Tramadol early. She states that her pain is better today compared to before the injection (before would be a 8-9 out of 10, but now is closer to a 6-8 out of 10). Her pain is worse with moving in bed and even when her aids are helping her with any movements. She does use a rollator at home to ambulate. She also endorses constipation that has gotten worse than her baseline (before was every 2-3 days and now has been about 5 days since her last bowel movement) and she also feels more fool and bloated. No new bladder incontinence. No new tingling in hands or feet and also no new weakness in feet. She has taken dulcolax once a day for about 3-4 days without improvement. She also endorses increased regurgitation and also 2 episodes of vomiting.      [FreeTextEntry7] : Mid thoracic spine

## 2024-03-13 NOTE — REVIEW OF SYSTEMS
[Constipation] : constipation [Back Pain] : back pain [Muscle Pain] : muscle pain [Radiating Pain] : radiating pain [Decreased ROM] : decreased range of motion [Chills] : no chills [Fever] : no fever [Lower Ext Edema] : no lower extremity edema [Incontinence] : no incontinence [Skin Lesions] : no skin lesions [Numbness] : no numbness [FreeTextEntry7] : bloating, nausea, vomting, worsening constipation  [FreeTextEntry8] : chronic and uses depends diapers

## 2024-03-13 NOTE — ASSESSMENT
[FreeTextEntry1] : 89 year old female with a history of multiple compression fractures at T12, L3, L4 presents with chronic worsening midline back pain. 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 Following her last thoracic medial branch blocks T8-T10 she also had minimal improvement and continues to have close to her baseline level of pain (perhaps slightly improved). In addition she's developed opioid induced constipation from taking her Tramadol at twice the dosage as recommended. Shes developed symptoms of nausea and vomiting as well recently. Her MRI T and L spine showed evidence of a new T1 fracture and worsening T9 fracture but after discussing with the patient, she doesn't believe she can tolerate further intervention at this time.   Plan:  - Buprenorphine patch 7.5mg daily to help better control her pain - Movantik 12.5mg 1-2 tablets daily for opioid induced constipation - Recommend going to Emergency room if symptoms of nausea/ vomting get worse or if she doesn't have a bowel movement in the next 24 hours.  - Also prescribed a course of Lactulose to help with constipation if authorization is needed for constipation.  0 At next visit will initiate opioid agreement and urine toxicology

## 2024-03-13 NOTE — ED PROVIDER NOTE - PROGRESS NOTE DETAILS
pt given enema and had a small BM. Labs wnl. XR neg for air fluid levels. Called [facility] on [facility] and prescriptions are there waiting for auth. Tried to call Dr. Morley but did not receive a call back. SW/case mgmt involved in case, pt set up for homecare tomorrow. Pts outpt SW involved will help with getting hospital bed delivered and meds. Pt stable for dc, has 24 hour HHA

## 2024-03-13 NOTE — ED ADULT NURSE REASSESSMENT NOTE - NS ED NURSE REASSESS COMMENT FT1
Pt states "I want to be admitted." Pt informed PT needs to assess her. Pt asking to buy a hospital bed because she experiences back pain at home. Respirations spontaneous and unlabored, speaking in clear coherent sentences. Skin is warm, color appropriate for race. Awaiting PT consult.

## 2024-03-13 NOTE — ED PROVIDER NOTE - PHYSICAL EXAMINATION
CONSTITUTIONAL: Well-appearing;  in no apparent distress.   HEAD: Normocephalic; atraumatic.   EYES: PERRL; EOM intact; conjunctiva and sclera clear  ENT: normal nose; no rhinorrhea; normal pharynx with no erythema or lesions.   NECK: Supple; non-tender; no LAD  CARDIOVASCULAR: Normal S1, S2; No audible murmurs. Regular rate and rhythm.   RESPIRATORY: Breathing easily; breath sounds clear and equal bilaterally; no wheezes, rhonchi, or rales.  GI: Soft; non-distended; non-tender; no palpable organomegaly.   Rectal: no fecal impaction   MSK: FROM at all extremities, +tenderness over T/L spine   EXT: No cyanosis or edema; N/V intact  SKIN: Normal for age and race; warm; dry; good turgor; no apparent lesions or rash.   NEURO: A & O x 3; face symmetric; grossly unremarkable.   PSYCHOLOGICAL: The patient’s mood and manner are appropriate.

## 2024-03-13 NOTE — PHYSICAL EXAM
[Normal Spine curvature] : normal spine curvature [Spinous Process Tenderness] : spinous process tenderness [Normal] : Normal affect [de-identified] : room air and no conversational dyspnea  [de-identified] : no BLE edema noted [de-identified] : abdomen was slightly distended and mildly firm but not rigid  [de-identified] : Pressure noted over upper thoracic and lower thoraco/lumbar spine processes with palpation [de-identified] : patient unable to walk in office (no rolator) [facility] [de[facility]identified] : at least 4/5 strength noted in BLE for hip flexion, knee extension, ankle dorsiflexion, and plantarflexion  [de-identified] : patient is alert, follows commands and answers questions appropriately  [de-identified] : small bruising noted in bilateral hands

## 2024-03-13 NOTE — ED PROVIDER NOTE - PATIENT PORTAL LINK FT
You can access the FollowMyHealth Patient Portal offered by [facility] by registering at the following website: [email or web address]. By joining [employer]’s FollowMyHealth portal, you will also be able to view your health information using other applications (apps) compatible with our system.

## 2024-03-13 NOTE — ED PROVIDER NOTE - CLINICAL SUMMARY MEDICAL DECISION MAKING FREE TEXT BOX
89yo F w/ PMHx Breast Cx s/p R mastectomy, urinary retention, memory loss admitted 3/3-3/5 for back pain  w/ thoracolumbar compression fractures (recommended for home PT) & acute anemia, (CTAP Angio w/o active hemorrhage: RP bleed or intraabdominal bleeding r/o'd. No e/o hemolysis, TBili wnl. Seen by GI, outpt f/u) c/o low back pain x 3 weeks. Pt states she was taking tramadol which was helping but she ran out 3 days ago. Pt also c/o constipation and abd pain x 5 days. Last BM was 5 days ago, is passing gas. Pt unsure if she is taking anything for constipation (was prescribed senna and polyethylene glycol from her last visit). Pt states she has vomited twice after eating in the past week. Denies fever, chills, dysuria, hematuria, numbness, tingling. Pt has been incontinent for years. VSS. Abd soft, nt, nd, no fecal impaction on rectal exam. +tenderness over T/L spine. 89yo F w/ PMHx Breast Cx s/p R mastectomy, urinary retention, memory loss admitted 3/3-3/5 for back pain  w/ thoracolumbar compression fractures (recommended for home PT) & acute anemia, (CTAP Angio w/o active hemorrhage: RP bleed or intraabdominal bleeding r/o'd. No e/o hemolysis, TBili wnl. Seen by GI, outpt f/u) c/o low back pain x 3 weeks. Pt states she was taking tramadol which was helping but she ran out 3 days ago. Pt also c/o constipation and abd pain x 5 days. Last BM was 5 days ago, is passing gas. Pt unsure if she is taking anything for constipation (was prescribed senna and polyethylene glycol from her last visit). Pt states she has vomited twice after eating in the past week. Denies fever, chills, dysuria, hematuria, numbness, tingling. Pt has been incontinent for years. VSS. Abd soft, nt, nd, no fecal impaction on rectal exam. +tenderness over T/L spine. will treat pain, get abd xray to eval for air fluids levels.

## 2024-03-13 NOTE — ED PROVIDER NOTE - OBJECTIVE STATEMENT
89yo F w/ PMHx Breast Cx s/p R mastectomy, urinary retention, memory loss admitted 3/3-3/5 for back pain  w/ thoracolumbar compression fractures (recommended for home PT) & acute anemia, (CTAP Angio w/o active hemorrhage: RP bleed or intraabdominal bleeding r/o'd. No e/o hemolysis, TBili wnl. Seen by GI, outpt f/u) c/o low back pain x 3 weeks. Pt states she was taking tramadol which was helping but she ran out 3 days ago. Last week saw her orthopedist Dr. Stevens who referred her to Dr. Morley from pain mgmt who gave her an injection which she thinks did not help. Pt then saw her again yesterday and she prescribed her more medications but she was unable to pick them up yet from the pharmacy due to pre authorization. Pt also c/o constipation and abd pain x 5 days. Last BM was 5 days ago, is passing gas. Pt unsure if she is taking anything for constipation (was prescribed senna and polyethylene glycol from her last visit). Pt states she has vomited twice after eating in the past week. Denies fever, chills, dysuria, hematuria, numbness, tingling. Pt has been incontinent for years.   Pt had MRIs 2 days ago  which showed: mild progression of the moderate T9 compression fracture with bone marrow edema consistent with acute/subacute fracture with resultant retropulsion resulting in moderate spinal canal stenosis. Bone marrow edema in the severe L1 compression fracture consistent with acute/subacute component of the fracture. Again demonstrated is bony retropulsion which is unchanged from prior exam. Pt states she has not had anyone from PT come to her house since discharge. Pt mostly is bedbound and in a wheelchair, walks only a few steps with her walker.

## 2024-03-13 NOTE — ED ADULT NURSE NOTE - NSFALLHARMRISKINTERV_ED_ALL_ED

## 2024-03-19 RX ORDER — TRAMADOL HYDROCHLORIDE AND ACETAMINOPHEN 37.5; 325 MG/1; MG/1
37.5-325 TABLET, FILM COATED ORAL
Qty: 21 | Refills: 0 | Status: ACTIVE | COMMUNITY
Start: 2024-03-13 | End: 1900-01-01

## 2024-03-20 VITALS
RESPIRATION RATE: 16 BRPM | DIASTOLIC BLOOD PRESSURE: 50 MMHG | OXYGEN SATURATION: 96 % | SYSTOLIC BLOOD PRESSURE: 92 MMHG | HEIGHT: 62 IN | HEART RATE: 101 BPM | TEMPERATURE: 98 F | WEIGHT: 119.93 LBS

## 2024-03-20 LAB
ALBUMIN SERPL ELPH-MCNC: 2.8 G/DL — LOW (ref 3.3–5)
ALP SERPL-CCNC: 63 U/L — SIGNIFICANT CHANGE UP (ref 40–120)
ALT FLD-CCNC: 9 U/L — LOW (ref 10–45)
ANION GAP SERPL CALC-SCNC: 12 MMOL/L — SIGNIFICANT CHANGE UP (ref 5–17)
APTT BLD: 24.2 SEC — LOW (ref 24.5–35.6)
AST SERPL-CCNC: 16 U/L — SIGNIFICANT CHANGE UP (ref 10–40)
BASE EXCESS BLDV CALC-SCNC: 0.3 MMOL/L — SIGNIFICANT CHANGE UP (ref -2–3)
BILIRUB SERPL-MCNC: 0.2 MG/DL — SIGNIFICANT CHANGE UP (ref 0.2–1.2)
BUN SERPL-MCNC: 52 MG/DL — HIGH (ref 7–23)
CA-I SERPL-SCNC: 1.12 MMOL/L — LOW (ref 1.15–1.33)
CALCIUM SERPL-MCNC: 8.1 MG/DL — LOW (ref 8.4–10.5)
CHLORIDE SERPL-SCNC: 100 MMOL/L — SIGNIFICANT CHANGE UP (ref 96–108)
CO2 BLDV-SCNC: 25.8 MMOL/L — SIGNIFICANT CHANGE UP (ref 22–26)
CO2 SERPL-SCNC: 23 MMOL/L — SIGNIFICANT CHANGE UP (ref 22–31)
CREAT SERPL-MCNC: 1.05 MG/DL — SIGNIFICANT CHANGE UP (ref 0.5–1.3)
EGFR: 50 ML/MIN/1.73M2 — LOW
GAS PNL BLDV: 135 MMOL/L — LOW (ref 136–145)
GAS PNL BLDV: SIGNIFICANT CHANGE UP
GAS PNL BLDV: SIGNIFICANT CHANGE UP
GLUCOSE SERPL-MCNC: 144 MG/DL — HIGH (ref 70–99)
HCO3 BLDV-SCNC: 25 MMOL/L — SIGNIFICANT CHANGE UP (ref 22–29)
HCT VFR BLD CALC: 9.4 % — CRITICAL LOW (ref 34.5–45)
HGB BLD-MCNC: 2.9 G/DL — CRITICAL LOW (ref 11.5–15.5)
INR BLD: 1.08 — SIGNIFICANT CHANGE UP (ref 0.85–1.18)
LACTATE SERPL-SCNC: 3 MMOL/L — HIGH (ref 0.5–2)
MCHC RBC-ENTMCNC: 30.9 GM/DL — LOW (ref 32–36)
MCHC RBC-ENTMCNC: 33.7 PG — SIGNIFICANT CHANGE UP (ref 27–34)
MCV RBC AUTO: 109.3 FL — HIGH (ref 80–100)
PCO2 BLDV: 38 MMHG — LOW (ref 39–42)
PH BLDV: 7.42 — SIGNIFICANT CHANGE UP (ref 7.32–7.43)
PLATELET # BLD AUTO: 338 K/UL — SIGNIFICANT CHANGE UP (ref 150–400)
PO2 BLDV: <33 MMHG — SIGNIFICANT CHANGE UP (ref 25–45)
POTASSIUM BLDV-SCNC: 4.3 MMOL/L — SIGNIFICANT CHANGE UP (ref 3.5–5.1)
POTASSIUM SERPL-MCNC: 4.4 MMOL/L — SIGNIFICANT CHANGE UP (ref 3.5–5.3)
POTASSIUM SERPL-SCNC: 4.4 MMOL/L — SIGNIFICANT CHANGE UP (ref 3.5–5.3)
PROT SERPL-MCNC: 4.8 G/DL — LOW (ref 6–8.3)
PROTHROM AB SERPL-ACNC: 12.3 SEC — SIGNIFICANT CHANGE UP (ref 9.5–13)
RBC # BLD: 0.86 M/UL — LOW (ref 3.8–5.2)
RBC # FLD: 21.2 % — HIGH (ref 10.3–14.5)
SAO2 % BLDV: 48.3 % — LOW (ref 67–88)
SODIUM SERPL-SCNC: 135 MMOL/L — SIGNIFICANT CHANGE UP (ref 135–145)
WBC # BLD: 13.69 K/UL — HIGH (ref 3.8–10.5)
WBC # FLD AUTO: 13.69 K/UL — HIGH (ref 3.8–10.5)

## 2024-03-20 PROCEDURE — 99285 EMERGENCY DEPT VISIT HI MDM: CPT

## 2024-03-20 PROCEDURE — 71045 X-RAY EXAM CHEST 1 VIEW: CPT | Mod: 26

## 2024-03-20 RX ORDER — PANTOPRAZOLE SODIUM 20 MG/1
8 TABLET, DELAYED RELEASE ORAL
Qty: 80 | Refills: 0 | Status: DISCONTINUED | OUTPATIENT
Start: 2024-03-20 | End: 2024-03-21

## 2024-03-20 RX ORDER — PANTOPRAZOLE SODIUM 20 MG/1
80 TABLET, DELAYED RELEASE ORAL ONCE
Refills: 0 | Status: COMPLETED | OUTPATIENT
Start: 2024-03-20 | End: 2024-03-20

## 2024-03-20 RX ORDER — SODIUM CHLORIDE 9 MG/ML
2000 INJECTION, SOLUTION INTRAVENOUS ONCE
Refills: 0 | Status: COMPLETED | OUTPATIENT
Start: 2024-03-20 | End: 2024-03-20

## 2024-03-20 RX ADMIN — SODIUM CHLORIDE 2000 MILLILITER(S): 9 INJECTION, SOLUTION INTRAVENOUS at 23:45

## 2024-03-20 RX ADMIN — PANTOPRAZOLE SODIUM 80 MILLIGRAM(S): 20 TABLET, DELAYED RELEASE ORAL at 23:42

## 2024-03-20 NOTE — ED ADULT TRIAGE NOTE - CHIEF COMPLAINT QUOTE
biba;  pt has been increasingly weak , lethargic and with low grade fever (unable to provide temperature) ;  paramedics states that BP today was 86/50; as per EMS " NP evaluated pt today and gave IV fluid and Ceftriaxone"

## 2024-03-21 ENCOUNTER — INPATIENT (INPATIENT)
Facility: HOSPITAL | Age: 89
LOS: 1 days | Discharge: HOME | DRG: 811 | End: 2024-03-23
Attending: INTERNAL MEDICINE | Admitting: STUDENT IN AN ORGANIZED HEALTH CARE EDUCATION/TRAINING PROGRAM
Payer: MEDICARE

## 2024-03-21 DIAGNOSIS — D64.9 ANEMIA, UNSPECIFIED: ICD-10-CM

## 2024-03-21 DIAGNOSIS — R33.9 RETENTION OF URINE, UNSPECIFIED: ICD-10-CM

## 2024-03-21 DIAGNOSIS — D62 ACUTE POSTHEMORRHAGIC ANEMIA: ICD-10-CM

## 2024-03-21 DIAGNOSIS — E87.20 ACIDOSIS, UNSPECIFIED: ICD-10-CM

## 2024-03-21 DIAGNOSIS — Z29.9 ENCOUNTER FOR PROPHYLACTIC MEASURES, UNSPECIFIED: ICD-10-CM

## 2024-03-21 DIAGNOSIS — I48.91 UNSPECIFIED ATRIAL FIBRILLATION: ICD-10-CM

## 2024-03-21 DIAGNOSIS — R65.10 SYSTEMIC INFLAMMATORY RESPONSE SYNDROME (SIRS) OF NON-INFECTIOUS ORIGIN WITHOUT ACUTE ORGAN DYSFUNCTION: ICD-10-CM

## 2024-03-21 DIAGNOSIS — R41.82 ALTERED MENTAL STATUS, UNSPECIFIED: ICD-10-CM

## 2024-03-21 DIAGNOSIS — M54.9 DORSALGIA, UNSPECIFIED: ICD-10-CM
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
## 2024-03-21 DIAGNOSIS — I21.A1 MYOCARDIAL INFARCTION TYPE 2: ICD-10-CM

## 2024-03-21 DIAGNOSIS — K59.00 CONSTIPATION, UNSPECIFIED: ICD-10-CM
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
## 2024-03-21 LAB
ADD ON TEST-SPECIMEN IN LAB: SIGNIFICANT CHANGE UP
ADD ON TEST-SPECIMEN IN LAB: SIGNIFICANT CHANGE UP
ALBUMIN SERPL ELPH-MCNC: 2.7 G/DL — LOW (ref 3.3–5)
ALP SERPL-CCNC: 66 U/L — SIGNIFICANT CHANGE UP (ref 40–120)
ALT FLD-CCNC: 14 U/L — SIGNIFICANT CHANGE UP (ref 10–45)
ANION GAP SERPL CALC-SCNC: 8 MMOL/L — SIGNIFICANT CHANGE UP (ref 5–17)
ANISOCYTOSIS BLD QL: SLIGHT — SIGNIFICANT CHANGE UP
APPEARANCE UR: CLEAR — SIGNIFICANT CHANGE UP
AST SERPL-CCNC: 26 U/L — SIGNIFICANT CHANGE UP (ref 10–40)
BACTERIA # UR AUTO: NEGATIVE /HPF — SIGNIFICANT CHANGE UP
BASOPHILS # BLD AUTO: 0 K/UL — SIGNIFICANT CHANGE UP (ref 0–0.2)
BASOPHILS # BLD AUTO: 0.02 K/UL — SIGNIFICANT CHANGE UP (ref 0–0.2)
BASOPHILS NFR BLD AUTO: 0 % — SIGNIFICANT CHANGE UP (ref 0–2)
BASOPHILS NFR BLD AUTO: 0.2 % — SIGNIFICANT CHANGE UP (ref 0–2)
BILIRUB SERPL-MCNC: 1.6 MG/DL — HIGH (ref 0.2–1.2)
BILIRUB UR-MCNC: NEGATIVE — SIGNIFICANT CHANGE UP
BLD GP AB SCN SERPL QL: NEGATIVE — SIGNIFICANT CHANGE UP
BUN SERPL-MCNC: 41 MG/DL — HIGH (ref 7–23)
CALCIUM SERPL-MCNC: 8.2 MG/DL — LOW (ref 8.4–10.5)
CAST: 2 /LPF — SIGNIFICANT CHANGE UP (ref 0–4)
CHLORIDE SERPL-SCNC: 111 MMOL/L — HIGH (ref 96–108)
CO2 SERPL-SCNC: 21 MMOL/L — LOW (ref 22–31)
COLOR SPEC: YELLOW — SIGNIFICANT CHANGE UP
CREAT SERPL-MCNC: 0.97 MG/DL — SIGNIFICANT CHANGE UP (ref 0.5–1.3)
DIFF PNL FLD: NEGATIVE — SIGNIFICANT CHANGE UP
EGFR: 54 ML/MIN/1.73M2 — LOW
EOSINOPHIL # BLD AUTO: 0 K/UL — SIGNIFICANT CHANGE UP (ref 0–0.5)
EOSINOPHIL # BLD AUTO: 0.08 K/UL — SIGNIFICANT CHANGE UP (ref 0–0.5)
EOSINOPHIL NFR BLD AUTO: 0 % — SIGNIFICANT CHANGE UP (ref 0–6)
EOSINOPHIL NFR BLD AUTO: 0.6 % — SIGNIFICANT CHANGE UP (ref 0–6)
FLUAV AG NPH QL: SIGNIFICANT CHANGE UP
FLUBV AG NPH QL: SIGNIFICANT CHANGE UP
GIANT PLATELETS BLD QL SMEAR: PRESENT — SIGNIFICANT CHANGE UP
GLUCOSE BLDC GLUCOMTR-MCNC: 111 MG/DL — HIGH (ref 70–99)
GLUCOSE SERPL-MCNC: 98 MG/DL — SIGNIFICANT CHANGE UP (ref 70–99)
GLUCOSE UR QL: NEGATIVE MG/DL — SIGNIFICANT CHANGE UP
HAPTOGLOB SERPL-MCNC: SIGNIFICANT CHANGE UP MG/DL (ref 34–200)
HCT VFR BLD CALC: 16.2 % — CRITICAL LOW (ref 34.5–45)
HCT VFR BLD CALC: 25.2 % — LOW (ref 34.5–45)
HGB BLD-MCNC: 5.1 G/DL — CRITICAL LOW (ref 11.5–15.5)
HGB BLD-MCNC: 8.4 G/DL — LOW (ref 11.5–15.5)
HYPOCHROMIA BLD QL: SIGNIFICANT CHANGE UP
IMM GRANULOCYTES NFR BLD AUTO: 1.4 % — HIGH (ref 0–0.9)
KETONES UR-MCNC: ABNORMAL MG/DL
LACTATE SERPL-SCNC: 2 MMOL/L — SIGNIFICANT CHANGE UP (ref 0.5–2)
LEUKOCYTE ESTERASE UR-ACNC: ABNORMAL
LYMPHOCYTES # BLD AUTO: 1.1 K/UL — SIGNIFICANT CHANGE UP (ref 1–3.3)
LYMPHOCYTES # BLD AUTO: 1.17 K/UL — SIGNIFICANT CHANGE UP (ref 1–3.3)
LYMPHOCYTES # BLD AUTO: 8 % — LOW (ref 13–44)
LYMPHOCYTES # BLD AUTO: 8.9 % — LOW (ref 13–44)
MACROCYTES BLD QL: SLIGHT — SIGNIFICANT CHANGE UP
MANUAL SMEAR VERIFICATION: SIGNIFICANT CHANGE UP
MCHC RBC-ENTMCNC: 31.1 PG — SIGNIFICANT CHANGE UP (ref 27–34)
MCHC RBC-ENTMCNC: 31.1 PG — SIGNIFICANT CHANGE UP (ref 27–34)
MCHC RBC-ENTMCNC: 31.5 GM/DL — LOW (ref 32–36)
MCHC RBC-ENTMCNC: 33.3 GM/DL — SIGNIFICANT CHANGE UP (ref 32–36)
MCV RBC AUTO: 93.3 FL — SIGNIFICANT CHANGE UP (ref 80–100)
MCV RBC AUTO: 98.8 FL — SIGNIFICANT CHANGE UP (ref 80–100)
MONOCYTES # BLD AUTO: 0.68 K/UL — SIGNIFICANT CHANGE UP (ref 0–0.9)
MONOCYTES # BLD AUTO: 0.9 K/UL — SIGNIFICANT CHANGE UP (ref 0–0.9)
MONOCYTES NFR BLD AUTO: 5 % — SIGNIFICANT CHANGE UP (ref 2–14)
MONOCYTES NFR BLD AUTO: 6.9 % — SIGNIFICANT CHANGE UP (ref 2–14)
NEUTROPHILS # BLD AUTO: 10.77 K/UL — HIGH (ref 1.8–7.4)
NEUTROPHILS # BLD AUTO: 11.91 K/UL — HIGH (ref 1.8–7.4)
NEUTROPHILS NFR BLD AUTO: 82 % — HIGH (ref 43–77)
NEUTROPHILS NFR BLD AUTO: 87 % — HIGH (ref 43–77)
NITRITE UR-MCNC: NEGATIVE — SIGNIFICANT CHANGE UP
NRBC # BLD: 0 /100 WBCS — SIGNIFICANT CHANGE UP (ref 0–0)
NRBC # BLD: 0 /100 WBCS — SIGNIFICANT CHANGE UP (ref 0–0)
NRBC # BLD: 1 /100 WBCS — HIGH (ref 0–0)
NRBC # BLD: SIGNIFICANT CHANGE UP /100 WBCS (ref 0–0)
OVALOCYTES BLD QL SMEAR: SLIGHT — SIGNIFICANT CHANGE UP
PH UR: 5 — SIGNIFICANT CHANGE UP (ref 5–8)
PLAT MORPH BLD: ABNORMAL
PLATELET # BLD AUTO: 207 K/UL — SIGNIFICANT CHANGE UP (ref 150–400)
PLATELET # BLD AUTO: 213 K/UL — SIGNIFICANT CHANGE UP (ref 150–400)
PLATELET CLUMP BLD QL SMEAR: ABNORMAL
POIKILOCYTOSIS BLD QL AUTO: SLIGHT — SIGNIFICANT CHANGE UP
POLYCHROMASIA BLD QL SMEAR: SLIGHT — SIGNIFICANT CHANGE UP
POTASSIUM SERPL-MCNC: 4.5 MMOL/L — SIGNIFICANT CHANGE UP (ref 3.5–5.3)
POTASSIUM SERPL-SCNC: 4.5 MMOL/L — SIGNIFICANT CHANGE UP (ref 3.5–5.3)
PROT SERPL-MCNC: 5 G/DL — LOW (ref 6–8.3)
PROT UR-MCNC: NEGATIVE MG/DL — SIGNIFICANT CHANGE UP
RBC # BLD: 1.64 M/UL — LOW (ref 3.8–5.2)
RBC # BLD: 2.7 M/UL — LOW (ref 3.8–5.2)
RBC # FLD: 18.4 % — HIGH (ref 10.3–14.5)
RBC # FLD: 19.3 % — HIGH (ref 10.3–14.5)
RBC BLD AUTO: ABNORMAL
RBC CASTS # UR COMP ASSIST: 2 /HPF — SIGNIFICANT CHANGE UP (ref 0–4)
RH IG SCN BLD-IMP: POSITIVE — SIGNIFICANT CHANGE UP
RSV RNA NPH QL NAA+NON-PROBE: SIGNIFICANT CHANGE UP
SARS-COV-2 RNA SPEC QL NAA+PROBE: SIGNIFICANT CHANGE UP
SCHISTOCYTES BLD QL AUTO: SLIGHT — SIGNIFICANT CHANGE UP
SMUDGE CELLS # BLD: PRESENT — SIGNIFICANT CHANGE UP
SODIUM SERPL-SCNC: 140 MMOL/L — SIGNIFICANT CHANGE UP (ref 135–145)
SP GR SPEC: 1.02 — SIGNIFICANT CHANGE UP (ref 1–1.03)
SQUAMOUS # UR AUTO: 4 /HPF — SIGNIFICANT CHANGE UP (ref 0–5)
TARGETS BLD QL SMEAR: SLIGHT — SIGNIFICANT CHANGE UP
TROPONIN T, HIGH SENSITIVITY RESULT: 81 NG/L — CRITICAL HIGH (ref 0–51)
TROPONIN T, HIGH SENSITIVITY RESULT: 83 NG/L — CRITICAL HIGH (ref 0–51)
UROBILINOGEN FLD QL: 0.2 MG/DL — SIGNIFICANT CHANGE UP (ref 0.2–1)
WBC # BLD: 13.13 K/UL — HIGH (ref 3.8–10.5)
WBC # BLD: 14.26 K/UL — HIGH (ref 3.8–10.5)
WBC # FLD AUTO: 13.13 K/UL — HIGH (ref 3.8–10.5)
WBC # FLD AUTO: 14.26 K/UL — HIGH (ref 3.8–10.5)
WBC UR QL: 8 /HPF — HIGH (ref 0–5)

## 2024-03-21 PROCEDURE — 74174 CTA ABD&PLVS W/CONTRAST: CPT | Mod: 26,MC

## 2024-03-21 PROCEDURE — 99233 SBSQ HOSP IP/OBS HIGH 50: CPT | Mod: GC

## 2024-03-21 PROCEDURE — 99232 SBSQ HOSP IP/OBS MODERATE 35: CPT | Mod: GC

## 2024-03-21 PROCEDURE — 70450 CT HEAD/BRAIN W/O DYE: CPT | Mod: 26,MC

## 2024-03-21 PROCEDURE — 99222 1ST HOSP IP/OBS MODERATE 55: CPT | Mod: GC

## 2024-03-21 PROCEDURE — 99497 ADVNCD CARE PLAN 30 MIN: CPT | Mod: 25

## 2024-03-21 PROCEDURE — 99223 1ST HOSP IP/OBS HIGH 75: CPT

## 2024-03-21 RX ORDER — ACETAMINOPHEN 500 MG
650 TABLET ORAL EVERY 6 HOURS
Refills: 0 | Status: DISCONTINUED | OUTPATIENT
Start: 2024-03-21 | End: 2024-03-22

## 2024-03-21 RX ORDER — TRAMADOL HYDROCHLORIDE 50 MG/1
25 TABLET ORAL EVERY 12 HOURS
Refills: 0 | Status: DISCONTINUED | OUTPATIENT
Start: 2024-03-21 | End: 2024-03-22

## 2024-03-21 RX ORDER — SENNA PLUS 8.6 MG/1
2 TABLET ORAL AT BEDTIME
Refills: 0 | Status: DISCONTINUED | OUTPATIENT
Start: 2024-03-21 | End: 2024-03-23

## 2024-03-21 RX ORDER — SALIVA SUBSTITUTE COMB NO.11 351 MG
5 POWDER IN PACKET (EA) MUCOUS MEMBRANE THREE TIMES A DAY
Refills: 0 | Status: DISCONTINUED | OUTPATIENT
Start: 2024-03-21 | End: 2024-03-23

## 2024-03-21 RX ORDER — ACETAMINOPHEN 500 MG
650 TABLET ORAL EVERY 6 HOURS
Refills: 0 | Status: DISCONTINUED | OUTPATIENT
Start: 2024-03-21 | End: 2024-03-21

## 2024-03-21 RX ORDER — ACETAMINOPHEN 500 MG
1000 TABLET ORAL ONCE
Refills: 0 | Status: COMPLETED | OUTPATIENT
Start: 2024-03-21 | End: 2024-03-21

## 2024-03-21 RX ORDER — SODIUM CHLORIDE 9 MG/ML
500 INJECTION, SOLUTION INTRAVENOUS ONCE
Refills: 0 | Status: COMPLETED | OUTPATIENT
Start: 2024-03-21 | End: 2024-03-21

## 2024-03-21 RX ORDER — GABAPENTIN 400 MG/1
100 CAPSULE ORAL EVERY 24 HOURS
Refills: 0 | Status: DISCONTINUED | OUTPATIENT
Start: 2024-03-21 | End: 2024-03-22

## 2024-03-21 RX ORDER — POLYETHYLENE GLYCOL 3350 17 G/17G
17 POWDER, FOR SOLUTION ORAL
Refills: 0 | Status: DISCONTINUED | OUTPATIENT
Start: 2024-03-21 | End: 2024-03-23

## 2024-03-21 RX ORDER — ESCITALOPRAM OXALATE 10 MG/1
5 TABLET, FILM COATED ORAL DAILY
Refills: 0 | Status: DISCONTINUED | OUTPATIENT
Start: 2024-03-21 | End: 2024-03-22

## 2024-03-21 RX ORDER — PANTOPRAZOLE SODIUM 20 MG/1
40 TABLET, DELAYED RELEASE ORAL EVERY 12 HOURS
Refills: 0 | Status: DISCONTINUED | OUTPATIENT
Start: 2024-03-21 | End: 2024-03-22

## 2024-03-21 RX ADMIN — SENNA PLUS 2 TABLET(S): 8.6 TABLET ORAL at 23:25

## 2024-03-21 RX ADMIN — PANTOPRAZOLE SODIUM 10 MG/HR: 20 TABLET, DELAYED RELEASE ORAL at 10:05

## 2024-03-21 RX ADMIN — POLYETHYLENE GLYCOL 3350 17 GRAM(S): 17 POWDER, FOR SOLUTION ORAL at 18:03

## 2024-03-21 RX ADMIN — Medication 650 MILLIGRAM(S): at 18:34

## 2024-03-21 RX ADMIN — PANTOPRAZOLE SODIUM 40 MILLIGRAM(S): 20 TABLET, DELAYED RELEASE ORAL at 18:03

## 2024-03-21 RX ADMIN — PANTOPRAZOLE SODIUM 10 MG/HR: 20 TABLET, DELAYED RELEASE ORAL at 00:00

## 2024-03-21 RX ADMIN — GABAPENTIN 100 MILLIGRAM(S): 400 CAPSULE ORAL at 11:49

## 2024-03-21 RX ADMIN — SODIUM CHLORIDE 500 MILLILITER(S): 9 INJECTION, SOLUTION INTRAVENOUS at 00:21

## 2024-03-21 RX ADMIN — TRAMADOL HYDROCHLORIDE 25 MILLIGRAM(S): 50 TABLET ORAL at 16:04

## 2024-03-21 RX ADMIN — ESCITALOPRAM OXALATE 5 MILLIGRAM(S): 10 TABLET, FILM COATED ORAL at 18:03

## 2024-03-21 RX ADMIN — Medication 650 MILLIGRAM(S): at 18:03

## 2024-03-21 RX ADMIN — TRAMADOL HYDROCHLORIDE 25 MILLIGRAM(S): 50 TABLET ORAL at 17:04

## 2024-03-21 RX ADMIN — Medication 10 MILLIGRAM(S): at 07:21

## 2024-03-21 RX ADMIN — Medication 1000 MILLIGRAM(S): at 13:15

## 2024-03-21 RX ADMIN — Medication 650 MILLIGRAM(S): at 23:24

## 2024-03-21 RX ADMIN — Medication 400 MILLIGRAM(S): at 12:57

## 2024-03-21 NOTE — H&P ADULT - PROBLEM SELECTOR PLAN 3
Pt with AMS on admission, unclear baseline etiology. Has a HHA a few hours a day and was last discharged in early March with home PT.     - More collateral from emergency contact and HHA in AM

## 2024-03-21 NOTE — PROGRESS NOTE ADULT - SUBJECTIVE AND OBJECTIVE BOX
SUBJECTIVE / INTERVAL HPI: Patient seen and examined at bedside. Reports feeling comfortable. Patient affirmed decision to be made DNR/DNI and agrees that she would not want an endoscopy at this time as she prioritizes comfort and understands the risks of EGD at this time.     VITAL SIGNS:  Vital Signs Last 24 Hrs  T(C): 36.9 (21 Mar 2024 14:17), Max: 37.3 (21 Mar 2024 01:52)  T(F): 98.4 (21 Mar 2024 14:17), Max: 99.2 (21 Mar 2024 01:52)  HR: 84 (21 Mar 2024 16:00) (82 - 109)  BP: 119/58 (21 Mar 2024 16:00) (92/50 - 131/59)  BP(mean): 84 (21 Mar 2024 16:00) (81 - 85)  RR: 18 (21 Mar 2024 16:00) (16 - 18)  SpO2: 97% (21 Mar 2024 16:00) (94% - 98%)    Parameters below as of 21 Mar 2024 16:00  Patient On (Oxygen Delivery Method): room air        PHYSICAL EXAM:    General: Well developed, well nourished, no acute distress  HEENT: NC/AT; PERRL, anicteric sclera; MMM  Neck: supple  Cardiovascular: +S1/S2, RRR, no murmurs, rubs, gallops  Respiratory: CTA B/L; no W/R/R  Gastrointestinal: soft, NT/ND; +BSx4  Extremities: WWP; no edema, clubbing or cyanosis  Vascular: 2+ radial, DP/PT pulses B/L  Neurological: AAOx3; no focal deficits    MEDICATIONS:  MEDICATIONS  (STANDING):  acetaminophen     Tablet .. 650 milliGRAM(s) Oral every 6 hours  escitalopram 5 milliGRAM(s) Oral daily  gabapentin 100 milliGRAM(s) Oral every 24 hours  pantoprazole  Injectable 40 milliGRAM(s) IV Push every 12 hours  polyethylene glycol 3350 17 Gram(s) Oral two times a day  senna 2 Tablet(s) Oral at bedtime    MEDICATIONS  (PRN):  Biotene Dry Mouth Oral Rinse 5 milliLiter(s) Swish and Spit three times a day PRN Mouth Care  traMADol 25 milliGRAM(s) Oral every 12 hours PRN Moderate Pain (4 - 6)      ALLERGIES:  Allergies    No Known Allergies    Intolerances        LABS:                        8.4    13.13 )-----------( 207      ( 21 Mar 2024 13:26 )             25.2     03-21    140  |  111<H>  |  41<H>  ----------------------------<  98  4.5   |  21<L>  |  0.97    Ca    8.2<L>      21 Mar 2024 12:00    TPro  5.0<L>  /  Alb  2.7<L>  /  TBili  1.6<H>  /  DBili  x   /  AST  26  /  ALT  14  /  AlkPhos  66  03-21    PT/INR - ( 20 Mar 2024 23:06 )   PT: 12.3 sec;   INR: 1.08          PTT - ( 20 Mar 2024 23:06 )  PTT:24.2 sec  Urinalysis Basic - ( 21 Mar 2024 12:00 )    Color: x / Appearance: x / SG: x / pH: x  Gluc: 98 mg/dL / Ketone: x  / Bili: x / Urobili: x   Blood: x / Protein: x / Nitrite: x   Leuk Esterase: x / RBC: x / WBC x   Sq Epi: x / Non Sq Epi: x / Bacteria: x      CAPILLARY BLOOD GLUCOSE      POCT Blood Glucose.: 111 mg/dL (21 Mar 2024 11:28)      RADIOLOGY & ADDITIONAL TESTS: Reviewed. ----------TRANSFER from [facility] TO [facility]----------    Hospital Course: 89F PMH breast cancer s/p right mastectomy, urinary retention, thoracolumbar compression fractures, chronic anemia, mild dementia/cognitive impairment, recent admission for anemia requiring transfusion and spinal compression fractures BIBEMS for hypotension at home, found to have HgB 2.9 s/p 4u pRBCs with repeat HgB 8.4, transferred to [facility] for further monitoring. Discussed patient wishes; patient expressing desire for pain control and to be treated at home but no wish for EGD (GI consult deferred). Palliative care consulted for pain control and formal GOC discussion. Started IV Tylenol, tramadol 25mg q12h as needed for pain. S&S consulted, recommending Biotene and soft/bite-sized diet for dry mouth. Switched Protonix drip to IV BID. Stable for SD to [facility] for further management of pain, constipation, PT eval, and palliative recommendations.    SUBJECTIVE / INTERVAL HPI: Patient seen and examined at bedside. Reports feeling comfortable. Patient affirmed decision to be made DNR/DNI and agrees that she would not want an endoscopy at this time as she prioritizes comfort and understands the risks of EGD at this time. Denies chest pain, SOB. Endorses mild abd fullness but no severe pain.    VITAL SIGNS:  Vital Signs Last 24 Hrs  T(C): 36.9 (21 Mar 2024 14:17), Max: 37.3 (21 Mar 2024 01:52)  T(F): 98.4 (21 Mar 2024 14:17), Max: 99.2 (21 Mar 2024 01:52)  HR: 84 (21 Mar 2024 16:00) (82 - 109)  BP: 119/58 (21 Mar 2024 16:00) (92/50 - 131/59)  BP(mean): 84 (21 Mar 2024 16:00) (81 - 85)  RR: 18 (21 Mar 2024 16:00) (16 - 18)  SpO2: 97% (21 Mar 2024 16:00) (94% - 98%)    Parameters below as of 21 Mar 2024 16:00  Patient On (Oxygen Delivery Method): room air        PHYSICAL EXAM:    General: Well developed, well nourished, no acute distress, very pleasant  HEENT: NC/AT; PERRL, anicteric sclera; MMM  Cardiovascular: +S1/S2, RRR, no murmurs, rubs, gallops  Respiratory: CTA B/L; no W/R/R  Gastrointestinal: soft, NT/ND; +BSx4  Extremities: WWP; bilateral 1+ pitting edema  Vascular: 2+ radial, DP/PT pulses B/L  Neurological: AAOx3 (name, location, date); no focal deficits    MEDICATIONS:  MEDICATIONS  (STANDING):  acetaminophen     Tablet .. 650 milliGRAM(s) Oral every 6 hours  escitalopram 5 milliGRAM(s) Oral daily  gabapentin 100 milliGRAM(s) Oral every 24 hours  pantoprazole  Injectable 40 milliGRAM(s) IV Push every 12 hours  polyethylene glycol 3350 17 Gram(s) Oral two times a day  senna 2 Tablet(s) Oral at bedtime    MEDICATIONS  (PRN):  Biotene Dry Mouth Oral Rinse 5 milliLiter(s) Swish and Spit three times a day PRN Mouth Care  traMADol 25 milliGRAM(s) Oral every 12 hours PRN Moderate Pain (4 - 6)      ALLERGIES:  Allergies    No Known Allergies    Intolerances        LABS:                        8.4    13.13 )-----------( 207      ( 21 Mar 2024 13:26 )             25.2     03-21    140  |  111<H>  |  41<H>  ----------------------------<  98  4.5   |  21<L>  |  0.97    Ca    8.2<L>      21 Mar 2024 12:00    TPro  5.0<L>  /  Alb  2.7<L>  /  TBili  1.6<H>  /  DBili  x   /  AST  26  /  ALT  14  /  AlkPhos  66  03-21    PT/INR - ( 20 Mar 2024 23:06 )   PT: 12.3 sec;   INR: 1.08          PTT - ( 20 Mar 2024 23:06 )  PTT:24.2 sec  Urinalysis Basic - ( 21 Mar 2024 12:00 )    Color: x / Appearance: x / SG: x / pH: x  Gluc: 98 mg/dL / Ketone: x  / Bili: x / Urobili: x   Blood: x / Protein: x / Nitrite: x   Leuk Esterase: x / RBC: x / WBC x   Sq Epi: x / Non Sq Epi: x / Bacteria: x      CAPILLARY BLOOD GLUCOSE      POCT Blood Glucose.: 111 mg/dL (21 Mar 2024 11:28)      RADIOLOGY & ADDITIONAL TESTS: Reviewed.

## 2024-03-21 NOTE — ED PROVIDER NOTE - PROGRESS NOTE DETAILS
Pt improved mental status, now close to baseline, AAOx3-4. She states she feels much better with PRBC transfusion. Pending CT imaging and admission.

## 2024-03-21 NOTE — PROGRESS NOTE ADULT - SUBJECTIVE AND OBJECTIVE BOX
OVERNIGHT EVENTS:    SUBJECTIVE / INTERVAL HPI: Patient seen and examined at bedside.     VITAL SIGNS:  Vital Signs Last 24 Hrs  T(C): 36.6 (21 Mar 2024 06:45), Max: 37.3 (21 Mar 2024 01:52)  T(F): 97.8 (21 Mar 2024 06:45), Max: 99.2 (21 Mar 2024 01:52)  HR: 82 (21 Mar 2024 06:45) (82 - 109)  BP: 115/62 (21 Mar 2024 06:45) (92/50 - 131/59)  BP(mean): --  RR: 17 (21 Mar 2024 06:45) (16 - 18)  SpO2: 97% (21 Mar 2024 06:45) (94% - 98%)    Parameters below as of 21 Mar 2024 06:45  Patient On (Oxygen Delivery Method): nasal cannula  O2 Flow (L/min): 2    I&O's Summary      PHYSICAL EXAM:    General: WDWN  HEENT: NC/AT; PERRL, anicteric sclera; MMM  Neck: supple  Cardiovascular: +S1/S2; RRR  Respiratory: CTA B/L; no W/R/R  Gastrointestinal: soft, NT/ND; +BSx4  Extremities: WWP; no edema, clubbing or cyanosis  Vascular: 2+ radial, DP/PT pulses B/L  Neurological: AAOx3; no focal deficits    MEDICATIONS:  MEDICATIONS  (STANDING):  bisacodyl Suppository 10 milliGRAM(s) Rectal once  pantoprazole Infusion 8 mG/Hr (10 mL/Hr) IV Continuous <Continuous>  polyethylene glycol 3350 17 Gram(s) Oral two times a day  senna 2 Tablet(s) Oral at bedtime    MEDICATIONS  (PRN):      ALLERGIES:  Allergies    No Known Allergies    Intolerances        LABS:                        5.1    14.26 )-----------( 213      ( 21 Mar 2024 03:16 )             16.2     03-20    135  |  100  |  52<H>  ----------------------------<  144<H>  4.4   |  23  |  1.05    Ca    8.1<L>      20 Mar 2024 23:06    TPro  4.8<L>  /  Alb  2.8<L>  /  TBili  0.2  /  DBili  x   /  AST  16  /  ALT  9<L>  /  AlkPhos  63  03-20    PT/INR - ( 20 Mar 2024 23:06 )   PT: 12.3 sec;   INR: 1.08          PTT - ( 20 Mar 2024 23:06 )  PTT:24.2 sec  Urinalysis Basic - ( 21 Mar 2024 00:49 )    Color: Yellow / Appearance: Clear / S.017 / pH: x  Gluc: x / Ketone: Trace mg/dL  / Bili: Negative / Urobili: 0.2 mg/dL   Blood: x / Protein: Negative mg/dL / Nitrite: Negative   Leuk Esterase: Trace / RBC: 2 /HPF / WBC 8 /HPF   Sq Epi: x / Non Sq Epi: 4 /HPF / Bacteria: Negative /HPF      CAPILLARY BLOOD GLUCOSE      POCT Blood Glucose.: 164 mg/dL (20 Mar 2024 23:15)      RADIOLOGY & ADDITIONAL TESTS: Reviewed.   OVERNIGHT EVENTS: Admitted to [facility]. Received 2u pRBCs. One small BM in AM.    SUBJECTIVE / INTERVAL HPI: Patient seen and examined at bedside. Complains of abdominal and back pain; denies chest pain, dizziness, SOB, weakness, or fatigue.    VITAL SIGNS:  Vital Signs Last 24 Hrs  T(C): 36.6 (21 Mar 2024 06:45), Max: 37.3 (21 Mar 2024 01:52)  T(F): 97.8 (21 Mar 2024 06:45), Max: 99.2 (21 Mar 2024 01:52)  HR: 82 (21 Mar 2024 06:45) (82 - 109)  BP: 115/62 (21 Mar 2024 06:45) (92/50 - 131/59)  BP(mean): --  RR: 17 (21 Mar 2024 06:45) (16 - 18)  SpO2: 97% (21 Mar 2024 06:45) (94% - 98%)    Parameters below as of 21 Mar 2024 06:45  Patient On (Oxygen Delivery Method): nasal cannula  O2 Flow (L/min): 2    I&O's Summary      PHYSICAL EXAM:    General: Elderly woman in NAD  HEENT: NC/AT; PERRL, anicteric sclera; dry MM  Neck: supple  Cardiovascular: +S1/S2; RRR  Respiratory: CTA B/L; no W/R/R  Gastrointestinal: soft, nontender but moderately distended; +BSx4. MAURICIO negative  Back: TTP at multiple levels along vertebral column  Extremities: WWP; no edema, clubbing or cyanosis  Vascular: 2+ radial, DP/PT pulses B/L  Neurological: AAOx3; no focal deficits    MEDICATIONS:  MEDICATIONS  (STANDING):  bisacodyl Suppository 10 milliGRAM(s) Rectal once  pantoprazole Infusion 8 mG/Hr (10 mL/Hr) IV Continuous <Continuous>  polyethylene glycol 3350 17 Gram(s) Oral two times a day  senna 2 Tablet(s) Oral at bedtime    MEDICATIONS  (PRN):      ALLERGIES:  Allergies    No Known Allergies    Intolerances        LABS:                        5.1    14.26 )-----------( 213      ( 21 Mar 2024 03:16 )             16.2     03-20    135  |  100  |  52<H>  ----------------------------<  144<H>  4.4   |  23  |  1.05    Ca    8.1<L>      20 Mar 2024 23:06    TPro  4.8<L>  /  Alb  2.8<L>  /  TBili  0.2  /  DBili  x   /  AST  16  /  ALT  9<L>  /  AlkPhos  63  03-20    PT/INR - ( 20 Mar 2024 23:06 )   PT: 12.3 sec;   INR: 1.08          PTT - ( 20 Mar 2024 23:06 )  PTT:24.2 sec  Urinalysis Basic - ( 21 Mar 2024 00:49 )    Color: Yellow / Appearance: Clear / S.017 / pH: x  Gluc: x / Ketone: Trace mg/dL  / Bili: Negative / Urobili: 0.2 mg/dL   Blood: x / Protein: Negative mg/dL / Nitrite: Negative   Leuk Esterase: Trace / RBC: 2 /HPF / WBC 8 /HPF   Sq Epi: x / Non Sq Epi: 4 /HPF / Bacteria: Negative /HPF      CAPILLARY BLOOD GLUCOSE      POCT Blood Glucose.: 164 mg/dL (20 Mar 2024 23:15)      RADIOLOGY & ADDITIONAL TESTS: Reviewed.   [facility]----------    Hospital Course: 89F PMH breast cancer s/p right mastectomy, urinary retention, thoracolumbar compression fractures, chronic anemia, mild dementia/cognitive impairment, recent admission for anemia requiring transfusion and spinal compression fractures BIBEMS for hypotension at home, found to have HgB 2.9 s/p 4u pRBCs with repeat HgB 5.1, transferred to [facility] for further monitoring.     OVERNIGHT EVENTS: Admitted to [facility]. Received 2u pRBCs. One small BM in AM.    SUBJECTIVE / INTERVAL HPI: Patient seen and examined at bedside. Complains of abdominal and back pain; denies chest pain, dizziness, SOB, weakness, or fatigue.    VITAL SIGNS:  Vital Signs Last 24 Hrs  T(C): 36.6 (21 Mar 2024 06:45), Max: 37.3 (21 Mar 2024 01:52)  T(F): 97.8 (21 Mar 2024 06:45), Max: 99.2 (21 Mar 2024 01:52)  HR: 82 (21 Mar 2024 06:45) (82 - 109)  BP: 115/62 (21 Mar 2024 06:45) (92/50 - 131/59)  BP(mean): --  RR: 17 (21 Mar 2024 06:45) (16 - 18)  SpO2: 97% (21 Mar 2024 06:45) (94% - 98%)    Parameters below as of 21 Mar 2024 06:45  Patient On (Oxygen Delivery Method): nasal cannula  O2 Flow (L/min): 2    I&O's Summary      PHYSICAL EXAM:    General: Elderly woman in NAD  HEENT: NC/AT; PERRL, anicteric sclera; dry MM  Neck: supple  Cardiovascular: +S1/S2; RRR  Respiratory: CTA B/L; no W/R/R  Gastrointestinal: soft, nontender but moderately distended; +BSx4. MAURICIO negative  Back: TTP at multiple levels along vertebral column  Extremities: WWP; no edema, clubbing or cyanosis  Vascular: 2+ radial, DP/PT pulses B/L  Neurological: AAOx3; no focal deficits    MEDICATIONS:  MEDICATIONS  (STANDING):  bisacodyl Suppository 10 milliGRAM(s) Rectal once  pantoprazole Infusion 8 mG/Hr (10 mL/Hr) IV Continuous <Continuous>  polyethylene glycol 3350 17 Gram(s) Oral two times a day  senna 2 Tablet(s) Oral at bedtime    MEDICATIONS  (PRN):      ALLERGIES:  Allergies    No Known Allergies    Intolerances        LABS:                        5.1    14.26 )-----------( 213      ( 21 Mar 2024 03:16 )             16.2     03-20    135  |  100  |  52<H>  ----------------------------<  144<H>  4.4   |  23  |  1.05    Ca    8.1<L>      20 Mar 2024 23:06    TPro  4.8<L>  /  Alb  2.8<L>  /  TBili  0.2  /  DBili  x   /  AST  16  /  ALT  9<L>  /  AlkPhos  63  03-20    PT/INR - ( 20 Mar 2024 23:06 )   PT: 12.3 sec;   INR: 1.08          PTT - ( 20 Mar 2024 23:06 )  PTT:24.2 sec  Urinalysis Basic - ( 21 Mar 2024 00:49 )    Color: Yellow / Appearance: Clear / S.017 / pH: x  Gluc: x / Ketone: Trace mg/dL  / Bili: Negative / Urobili: 0.2 mg/dL   Blood: x / Protein: Negative mg/dL / Nitrite: Negative   Leuk Esterase: Trace / RBC: 2 /HPF / WBC 8 /HPF   Sq Epi: x / Non Sq Epi: 4 /HPF / Bacteria: Negative /HPF      CAPILLARY BLOOD GLUCOSE      POCT Blood Glucose.: 164 mg/dL (20 Mar 2024 23:15)      RADIOLOGY & ADDITIONAL TESTS: Reviewed.   ----------TRANSFER NOTE [facility] TO [facility]----------    Hospital Course: 89F PMH breast cancer s/p right mastectomy, urinary retention, thoracolumbar compression fractures, chronic anemia, mild dementia/cognitive impairment, recent admission for anemia requiring transfusion and spinal compression fractures BIBEMS for hypotension at home, found to have HgB 2.9 s/p 4u pRBCs with repeat HgB 8.4, transferred to [facility] for further monitoring. Discussed patient wishes; patient expressing desire for pain control and to be treated at home but no wish for EGD (GI consult deferred). Palliative care consulted for pain control and formal GOC discussion. Started IV Tylenol, tramadol 25mg q12h as needed for pain. S&S consulted, recommending Biotene and soft/bite-sized diet for dry mouth. Switched Protonix drip to IV BID. Stable for SD to [facility] for further management of pain, PT eval, and palliative recommendations.    OVERNIGHT EVENTS: Admitted to [facility]. Received 2u pRBCs. One small BM in AM.    SUBJECTIVE / INTERVAL HPI: Patient seen and examined at bedside. Complains of abdominal and back pain; denies chest pain, dizziness, SOB, weakness, or fatigue.    VITAL SIGNS:  Vital Signs Last 24 Hrs  T(C): 36.6 (21 Mar 2024 06:45), Max: 37.3 (21 Mar 2024 01:52)  T(F): 97.8 (21 Mar 2024 06:45), Max: 99.2 (21 Mar 2024 01:52)  HR: 82 (21 Mar 2024 06:45) (82 - 109)  BP: 115/62 (21 Mar 2024 06:45) (92/50 - 131/59)  BP(mean): --  RR: 17 (21 Mar 2024 06:45) (16 - 18)  SpO2: 97% (21 Mar 2024 06:45) (94% - 98%)    Parameters below as of 21 Mar 2024 06:45  Patient On (Oxygen Delivery Method): nasal cannula  O2 Flow (L/min): 2    I&O's Summary      PHYSICAL EXAM:    General: Elderly woman in NAD  HEENT: NC/AT; PERRL, anicteric sclera; dry MM  Neck: supple  Cardiovascular: +S1/S2; RRR  Respiratory: CTA B/L; no W/R/R  Gastrointestinal: soft, nontender but moderately distended; +BSx4. MAURICIO negative  Back: TTP at multiple levels along vertebral column  Extremities: WWP; no edema, clubbing or cyanosis  Vascular: 2+ radial, DP/PT pulses B/L  Neurological: AAOx3; no focal deficits    MEDICATIONS:  MEDICATIONS  (STANDING):  bisacodyl Suppository 10 milliGRAM(s) Rectal once  pantoprazole Infusion 8 mG/Hr (10 mL/Hr) IV Continuous <Continuous>  polyethylene glycol 3350 17 Gram(s) Oral two times a day  senna 2 Tablet(s) Oral at bedtime    MEDICATIONS  (PRN):      ALLERGIES:  Allergies    No Known Allergies    Intolerances        LABS:                        5.1    14.26 )-----------( 213      ( 21 Mar 2024 03:16 )             16.2     03-20    135  |  100  |  52<H>  ----------------------------<  144<H>  4.4   |  23  |  1.05    Ca    8.1<L>      20 Mar 2024 23:06    TPro  4.8<L>  /  Alb  2.8<L>  /  TBili  0.2  /  DBili  x   /  AST  16  /  ALT  9<L>  /  AlkPhos  63  03-20    PT/INR - ( 20 Mar 2024 23:06 )   PT: 12.3 sec;   INR: 1.08          PTT - ( 20 Mar 2024 23:06 )  PTT:24.2 sec  Urinalysis Basic - ( 21 Mar 2024 00:49 )    Color: Yellow / Appearance: Clear / S.017 / pH: x  Gluc: x / Ketone: Trace mg/dL  / Bili: Negative / Urobili: 0.2 mg/dL   Blood: x / Protein: Negative mg/dL / Nitrite: Negative   Leuk Esterase: Trace / RBC: 2 /HPF / WBC 8 /HPF   Sq Epi: x / Non Sq Epi: 4 /HPF / Bacteria: Negative /HPF      CAPILLARY BLOOD GLUCOSE      POCT Blood Glucose.: 164 mg/dL (20 Mar 2024 23:15)      RADIOLOGY & ADDITIONAL TESTS: Reviewed.

## 2024-03-21 NOTE — SWALLOW BEDSIDE ASSESSMENT ADULT - SWALLOW EVAL: RECOMMENDED DIET
Soft bite sized and Thin liquids as tolerated (please advance to regular solids in accordance to patient preference)

## 2024-03-21 NOTE — PROGRESS NOTE ADULT - PROBLEM SELECTOR PLAN 3
Pt with AMS on admission, unclear baseline etiology. Has a HHA a few hours a day and was last discharged in early March with home PT.     - More collateral from emergency contact and HHA in AM #RESOLVED  Troponin on admission 81 --> 83, likely 2/2 demand ischemia. No chest pain or palpitations on exam. ECG sinus with APCs.    - NTD

## 2024-03-21 NOTE — PATIENT PROFILE ADULT - FALL HARM RISK - FACTORS
Dr. Jackson,     Spoke with Tamika, she stated that pt currently has redness on her abdomen, armpits, and in groin area, and these areas are itchy.     Meds cued. Please review/sign or advise.    Tamika would like meds/orders faxed to their facility at [phone]    Ana Luisa Garcia RN  [facility]  
Reason for call:  Other   Patient called regarding (reason for call): call back  Additional comments: Tamika a nurse with [facility] called and stated that the patient would like to re-start three different medications that she has taken in the past: Vitamin D 2000 units per day, TNC ointment, and Clindemycin lotion 1%. She would like a call back to discuss this.    Phone number to reach patient:  Other phone number: [phone]    Best Time:  Any    Can we leave a detailed message on this number?  YES    
Weakness

## 2024-03-21 NOTE — H&P ADULT - PROBLEM SELECTOR PLAN 4
Troponin on admission 81 --> 83, likely 2/2 demand ischemia. No chest pain or palpitations on exam.   ECG sinus with APCs    - Trend until cleared

## 2024-03-21 NOTE — H&P ADULT - HISTORY OF PRESENT ILLNESS
89F PMH Breast Cx s/p R mastectomy, urinary retention, thoracolumbar compression fractures, chronic anemia, mild dementia/cognitive impairment, recent admission for anemia requiring transfusion and spinal compression fractures BIBEMS for hypotension at home. Patient herself is poor historian however per ED documentation reportedly has been having 2 weeks of declining mental status, confusion, fatigue, constipation, and back pain. Was seen day of admission by [facility] medicine provider and found to be hypotensive, tachycardic (new A-fib), and hemoccult positive on rectal exam. She was given 1 g of ceftriaxone and 1L IVF at home and transported to ED. Patient herself currently endorsing back pain, abdominal pain, mild nausea and 2 episodes of vomiting 4-5 days ago. LBM 4-5 days ago. Denies any bleeding from anywhere, no blood in urine/stool/vomit, no dark stools. Denies chest pain, SOB, or fevers at home. Reports she lives alone at home.     ED Course:   Vitals: T 98.7  /52 RR 16 saturating 98% on RA --> 98% on 2L NC  Labs: WBC 13.69 hgb 2.9 Hct 9.4  Trop 81 Lactate 30 BUN 52   Imaging:   ·	CT Angio A/P: No evidence of GI hemorrhage on this study. Constipation.  ·	CT Head: No hydrocephalus, midline shift, acute intracranial hemorrhage or demarcated territorial infarct.  Interventions: Pantoprazole 80 mg IV and PPI gtt, 2L LR + 500 mL LR, 2u pRB

## 2024-03-21 NOTE — PROGRESS NOTE ADULT - PROBLEM SELECTOR PLAN 5
History of urinary retention, likely exacerbated by constipation. Briseno placed in the ED 3/21.     Plan:  - Continue with bowel regimen as stated above  - TOV after bowel movements

## 2024-03-21 NOTE — CONSULT NOTE ADULT - ATTENDING COMMENTS
Hiral Das  [id number]  ED -> [facility]  As above this is a 89 y/o female with anemia, GIB, thoracolumbar spine fracture  and refused GI w/u in the past for GIB, presented with AMS, hypotension and fatigue.  She is (+) for blood at the rectum, has an hgb of 2.9.  She was given PRBC and now the hgb is 5.1, her mentation is now at baseline, BP stable.  -acute post hemorrhagic anemia  -Acute on chronic GIB  -AMS ->improved  thoraco-lumbar vertebral fracture  -acute renal failure  -new onset afib  >maintain 2 large bore PIV  >transfuse PRBC to keep hgb around 8 to compensate for bleeding  >Gi consultation  >NPO  >RISS  >SCD  Please see above for the rest of the details

## 2024-03-21 NOTE — ED PROVIDER NOTE - CLINICAL SUMMARY MEDICAL DECISION MAKING FREE TEXT BOX
AMS, concern for GIB, hemoccult positive on home physician visit today. Anemia requiring transfusion on labs. Hg 2.9 in ED.  CTA angio, IR vs GI consult pending CTA results  IV Protonix  Transfuse 2U PRBC initially, reassess vital signs and mental response AMS, concern for GIB, hemoccult positive on home physician visit today. Anemia requiring transfusion on labs. Hg 2.9 in ED.  CTA angio, IR vs GI consult pending CTA results  Pt has new-onset afib, elevated trop (80) likely demand from anemia. Will treat anemia and reassess.  IV Protonix  Transfuse 2U PRBC initially, reassess vital signs and mental response

## 2024-03-21 NOTE — PROGRESS NOTE ADULT - PROBLEM SELECTOR PLAN 6
Patient brought to the ED on 5/13 found to be constipated and had a small BM after enema. XR was negative for air fluid levels. Discharged from ED on bowel regimen.   CTAP significant for stool burden     - C/w Miralax TID   - C/w Senna 2 tabs before bed   - C/w dulcolax enema daily Presenting w/ prolonged h/o back pain, follows with Dr. Stevens (ortho) outpatient. Was referred to Dr. Morley for pain management and received injections, with minimal relief.   -CT Thoracic and Lumbar Spine 3/4/2024: Since 4/19/2022, increased depression along the superior and inferior endplate of L2 now w/ moderate compression deformity, previously mild compression deformity. Cannot exclude acute on chronic compression deformity. Chronic severe compression deformities again noted at T12 and L1 with   mild to moderate chronic compression deformity at L3 and L4, all unchanged. Multilevel thoracic compression deformities with severe deformity at T3 and T12, moderate deformity at T7 and mild deformity at T9. At T9 there is moderate canal stenosis secondary to disc bulge and mild bony retropulsion.    Plan:  - PT consult (multiple falls at home)  - Start gabapentin 100mg PO qd for back pain  - Tramadol 25mg PO BID for pain  - Palliative consult  - Follow up outpatient with ortho and pain management Presenting w/ prolonged h/o back pain, follows with Dr. Stevens (ortho) outpatient. Was referred to Dr. Morley for pain management and received injections, with minimal relief.   -CT Thoracic and Lumbar Spine 3/4/2024: Since 4/19/2022, increased depression along the superior and inferior endplate of L2 now w/ moderate compression deformity, previously mild compression deformity. Cannot exclude acute on chronic compression deformity. Chronic severe compression deformities again noted at T12 and L1 with   mild to moderate chronic compression deformity at L3 and L4, all unchanged. Multilevel thoracic compression deformities with severe deformity at T3 and T12, moderate deformity at T7 and mild deformity at T9. At T9 there is moderate canal stenosis secondary to disc bulge and mild bony retropulsion.    Plan:  - PT consult (multiple falls at home)  - Start gabapentin 100mg PO qd for back pain  - Tramadol 25mg PO BID for pain  - Standing Tylenol 650mg PO q6h  - Palliative consult  - Follow up outpatient with ortho and pain management

## 2024-03-21 NOTE — H&P ADULT - PROBLEM SELECTOR PLAN 6
Patient brought to the ED on 5/13 found to be constipated and had a small BM after enema. XR was negative for air fluid levels. Discharged from ED on bowel regimen.   CTAP significant for stool burden     - C/w Miralax TID   - C/w Senna 2 tabs before bed   - C/w dulcolax enema daily

## 2024-03-21 NOTE — SWALLOW BEDSIDE ASSESSMENT ADULT - COMMENTS
Patient reported swallowing difficulty over the last week. She reported a fear of foods/pills "not going down." Symptoms started with stomach discomfort/pain. She pointed to R side of stomach.

## 2024-03-21 NOTE — SWALLOW BEDSIDE ASSESSMENT ADULT - SLP PERTINENT HISTORY OF CURRENT PROBLEM
PMHx of Breast Cx s/p R mastectomy, urinary retention, thoracolumbar compression fractures, chronic anemia, mild dementia/cognitive impairment, with recent admission for anemia requiring transfusion and spinal compression fractures. She brought to [facility] on 3/21/24 for hypotension at home. Patient was transferred to [facility] for further monitoring.

## 2024-03-21 NOTE — ED PROVIDER NOTE - OBJECTIVE STATEMENT
Breast CA s/p R mastectomy, urinary retention, memory loss admitted 3/3-3/5 for back pain  w/ thoracolumbar compression fractures (recommended for home PT) & acute anemia, BIBEMS for hypotension at home. Pt has been 2 weeks of declining mental status, confusion, fatigue, constipation, and back pain. At baseline AAO x4. Was seen today by  medicine provider, suspected GIB, hypotensive, tachycardic (new A-fib), Hemoccult positive. She was given 1 g of ceftriaxone and 1L IVF at home and transported into the ED.

## 2024-03-21 NOTE — H&P ADULT - ASSESSMENT
89F PMH Breast Cx s/p R mastectomy, urinary retention, thoracolumbar compression fractures, chronic anemia, mild dementia/cognitive impairment, recent admission for anemia requiring transfusion and spinal compression fractures BIBEMS for hypotension at home, found to have hgb 2.9 s/p 2u pRBC with repeat hgb 5.1, transferred to [facility] for further monitoring.

## 2024-03-21 NOTE — PROGRESS NOTE ADULT - PROBLEM SELECTOR PLAN 7
History of urinary retention, likely exacerbated by constipation. Briseno placed in the ED 3/21.     - Continue with bowel regimen as stated above F: S/p 1L fluid at home; S/p 1500 LR in ED   E: Replete as needed  N: Soft and bite-sized per S&S   DVT ppx: Holding i/s/o bleed  Dispo: Telemetry   CODE STATUS: DNR/DNI

## 2024-03-21 NOTE — H&P ADULT - NSHPLABSRESULTS_GEN_ALL_CORE
.  LABS:                         5.1    14.26 )-----------( 213      ( 21 Mar 2024 03:16 )             16.2         135  |  100  |  52<H>  ----------------------------<  144<H>  4.4   |  23  |  1.05    Ca    8.1<L>      20 Mar 2024 23:06    TPro  4.8<L>  /  Alb  2.8<L>  /  TBili  0.2  /  DBili  x   /  AST  16  /  ALT  9<L>  /  AlkPhos  63  03-20    PT/INR - ( 20 Mar 2024 23:06 )   PT: 12.3 sec;   INR: 1.08          PTT - ( 20 Mar 2024 23:06 )  PTT:24.2 sec  Urinalysis Basic - ( 21 Mar 2024 00:49 )    Color: Yellow / Appearance: Clear / S.017 / pH: x  Gluc: x / Ketone: Trace mg/dL  / Bili: Negative / Urobili: 0.2 mg/dL   Blood: x / Protein: Negative mg/dL / Nitrite: Negative   Leuk Esterase: Trace / RBC: 2 /HPF / WBC 8 /HPF   Sq Epi: x / Non Sq Epi: 4 /HPF / Bacteria: Negative /HPF            Lactate, Blood: 2.0 mmol/L ( @ 01:08)  Lactate, Blood: 3.0 mmol/L ( @ 23:06)      RADIOLOGY, EKG & ADDITIONAL TESTS: Reviewed.

## 2024-03-21 NOTE — PATIENT PROFILE ADULT - LIFE CHALLENGES - DETAILS
pt has periods of confusion, is anxious at times, family is in another state. pt does have HHA that visits to assist with ADLs.

## 2024-03-21 NOTE — PATIENT PROFILE ADULT - HAS THE PATIENT RECEIVED THE INFLUENZA VACCINE THIS SEASON?
yes...

## 2024-03-21 NOTE — ED ADULT NURSE NOTE - OBJECTIVE STATEMENT
Pt. presents to ED for weakness, subjective fever and hypotension  NP saw pt. at her home and gave Ceftriaxone and fluids  Pt. arrived hypotensive, pale and altered (a&ox2)  PT. placed on the continuous cardiac monitor and pulse ox  IV x 2 obtained  2 Suboxone patches removed from back

## 2024-03-21 NOTE — SWALLOW BEDSIDE ASSESSMENT ADULT - SWALLOW EVAL: DIAGNOSIS
Mildly prolonged mastication and bolus manipulation. Sticky secretions noted with mastication of solids. Suspect dry mouth contributed to oral phase deficits. Patient's primary complaints as oral intake continued seemed to correlate to prolonged mastication. Liquid wash frequently utilized. No clinical indicators of penetration/aspiration noted. No gross clinical indicators of pharyngeal inefficiency. No c/o globus sensation. Based on clinical presentation and per discussion with patient, recommend consideration of pills with puree (e.g., yogurt, applesauce) and soft consistency at this time.

## 2024-03-21 NOTE — PROGRESS NOTE ADULT - PROBLEM SELECTOR PLAN 3
Troponin on admission 81 --> 83, likely 2/2 demand ischemia. No chest pain or palpitations on exam. ECG sinus with APCs.    - trend trop to plateau

## 2024-03-21 NOTE — CONSULT NOTE ADULT - ASSESSMENT
89F PMH Breast Cx s/p R mastectomy, urinary retention, thoracolumbar compression fractures, chronic anemia, mild dementia/cognitive impairment, recent admission for anemia requiring transfusion and spinal compression fractures BIBEMS for hypotension/tachycardia and AMS at home, found to be anemic to 2.9 requiring transfusion. MICU consulted for management of anemia likely 2/2 UGIB and new onset afib with RVR.    NEURO   AAOx3, mentating at baseline    CARDIAC  #afib RVR    #elevated troponin     PULM   BRYCE     GI  #r/o UGIB    HEME  #anemia     ID  BRYCE     MSK  #compression fractures    PROPHYLAXIS   F:  E: replete as needed  N: NPO   DVT ppx: holding in setting of bleed  Dispo:  89F PMH Breast Cx s/p R mastectomy, urinary retention, thoracolumbar compression fractures, chronic anemia, mild dementia/cognitive impairment, recent admission for anemia requiring transfusion and spinal compression fractures BIBEMS for hypotension/tachycardia and AMS at home, found to be anemic to 2.9 requiring transfusion. MICU consulted for management of anemia likely 2/2 GIB and new onset afib with RVR.    NEURO   AAOx3, mentating at baseline  -CT head negative     CARDIAC  #afib RVR  -patient presenting with new onset afib with RVR, rate  and EKG showing afib   -patient denies chest pain or palpitations   -likely in setting of severe hypovolemia iso anemia/dehydration   -monitor on telemetry  -management of anemia as below/treat underlying cause     #elevated troponin   -troponin 81-->83 on admission   -EKG afib   -patient denies chest pain  -likely in setting of demand from severe anemia   -continue to trend trops to peak     PULM   BRYCE     GI  #r/o GIB  -patient presenting with hgb 2.9 on admission and BUN elevated to 52. FOBT+ at home however patient denies any bleeding/bloody or dark stools. Recently admitted 3/4-3/5/24 for anemia requiring transfusion, iron studies at that time wnl and GI consulted, elected to defer EGD/colon as inpatient.   -unclear etiology of anemia/bleed however suspect GIB   -no obvious s/sx bleeding on exam  -CT angio A/P in ED no evidence of GI hemorrhage   -s/p 2 units pRBCs in ED with repeat hgb 5.1; also started on protonix drip in ED   - GI consult given significant acute drop in Hb  - Start protonix 40mg IVP q12h  - transfuse another 2 units pRBCs and f/u post-transfusion CBC  - f/u LDH, reticulocyte count, haptoglobin, fibrinogen   - f/u iron panel, B12, folate  - maintain active T&S, transfuse Hb>7  -2 large bore IVs  -keep NPO for now     #constipation   -patient reports constipation with LBM 4-5 days ago     HEME  #anemia     RENAL  #ANDER     ID  BRYCE     MSK  #compression fractures  Lumbar compression fracture.  ·  Plan: Presenting w/ back pain since last week, severe dull 10/10 ache. Saw Dr. Stevens (ortho) outpatient, received injections into spine w/ some minimal pain relief.  Was planned for MRI on 3/7 w/ Dr Stevens  CT Thoracic and Lumbar Spine: Since 4/19/2022, increased depression along the superior and inferior endplate of L2 now w/ moderate compression deformity, previously mild compression deformity. Cannot exclude acute on chronic compression deformity. Chronic severe compression deformities again noted at T12 and L1 with   mild to moderate chronic compression deformity at L3 and L4, all unchanged. Multilevel thoracic compression deformities with severe deformity at T3 and T12, moderate deformity at T7 and mild deformity at T9. At T9 there is moderate canal stenosis secondary to disc bulge and mild bony retropulsion.    Compression fractures likely iso hx multiple falls     PLAN:   - consult ortho-spine, f/u recs  - f/u MRI Thoracic lumbar   - Pain mgmt w/ Acetaminophen 650 q6h standing, Tramadol 50mg q6h for mod pain, PO Morphine 15mg q6h for severe pain  - Fall precautions  - Consider PT consult if appropriate, if able to ambulate.      PROPHYLAXIS   F: s/p 2.5L LR   E: replete as needed  N: NPO   DVT ppx: holding in setting of bleed  Dispo: [facility] 89F PMH Breast Cx s/p R mastectomy, urinary retention, thoracolumbar compression fractures, chronic anemia, mild dementia/cognitive impairment, recent admission for anemia requiring transfusion and spinal compression fractures BIBEMS for hypotension/tachycardia and AMS at home, found to be anemic to 2.9 requiring transfusion. MICU consulted for management of anemia likely 2/2 GIB and new onset afib with RVR.    NEURO   AAOx3, mentating at baseline  -CT head negative     CARDIAC  #afib RVR  -patient presenting with new onset afib with RVR, rate  and EKG showing afib   -patient denies chest pain or palpitations   -likely in setting of severe hypovolemia iso anemia/dehydration   -monitor on telemetry  -management of anemia as below/treat underlying cause     #elevated troponin   -troponin 81-->83 on admission   -EKG afib   -patient denies chest pain  -likely in setting of demand from severe anemia   -continue to trend trops to peak     PULM   BRYCE     GI  #r/o GIB  -patient presenting with hgb 2.9 on admission and BUN elevated to 52. FOBT+ at home however patient denies any bleeding/bloody or dark stools. Recently admitted 3/4-3/5/24 for anemia requiring transfusion, iron studies at that time wnl and GI consulted, elected to defer EGD/colon as inpatient.   -unclear etiology of anemia/bleed however suspect GIB   -no obvious s/sx bleeding on exam  -CT angio A/P in ED no evidence of GI hemorrhage   -s/p 2 units pRBCs in ED with repeat hgb 5.1; also started on protonix drip in ED   - GI consult given significant acute drop in Hb  - Start protonix 40mg IVP q12h  - transfuse another 2 units pRBCs and f/u post-transfusion CBC  - f/u LDH, reticulocyte count, haptoglobin, fibrinogen   - f/u iron panel, B12, folate  - maintain active T&S, transfuse Hb>7  -2 large bore IVs  -keep NPO for now     #constipation   -patient reports constipation with LBM 4-5 days ago; CT A/P on admission showing fecal stasis   -miralax, senna and lactulose   -obtain bladder scan     HEME  #anemia   -patient presenting with hgb 2.9 on admission and BUN elevated to 52. FOBT+ at home however patient denies any bleeding/bloody or dark stools. Recently admitted 3/4-3/5/24 for anemia requiring transfusion, iron studies at that time wnl and GI consulted, elected to defer EGD/colon as inpatient.   -unclear etiology of anemia/bleed however suspect GIB   -no obvious s/sx bleeding on exam  -CT angio A/P in ED no evidence of GI hemorrhage   -s/p 2 units pRBCs in ED with repeat hgb 5.1; also started on protonix drip in ED   - GI consult given significant acute drop in Hb  - Start protonix 40mg IVP q12h  - transfuse another 2 units pRBCs and f/u post-transfusion CBC  - f/u LDH, reticulocyte count, haptoglobin, fibrinogen   - f/u iron panel, B12, folate  - maintain active T&S, transfuse Hb>7  -2 large bore IVs  -keep NPO for now     RENAL  #ANDER   -BUN/Cr 52/1.05 on admission, up from baseline Cr 0.70 previous admission   -likely pre-renal in setting of severe hypovolemia 2/2 anemia   -trend Cr s/p IVF and transfusion     ID  BRYCE     MSK  #compression fractures  -patient with known spinal compression fractures found on CT previous admission   -CT Thoracic and Lumbar Spine: Since 4/19/2022, increased depression along the superior and inferior endplate of L2 now w/ moderate compression deformity, previously mild compression deformity. Cannot exclude acute on chronic compression deformity. Chronic severe compression deformities again noted at T12 and L1 with mild to moderate chronic compression deformity at L3 and L4, all unchanged. Multilevel thoracic compression deformities with severe deformity at T3 and T12, moderate deformity at T7 and mild deformity at T9. At T9 there is moderate canal stenosis secondary to disc bulge and mild bony retropulsion.  -Compression fractures likely iso hx multiple falls   - Fall precautions  -PT consult   -f/u with ortho and pain mgmt as outpatient   -MRI as outpatient     PROPHYLAXIS   F: s/p 2.5L LR   E: replete as needed  N: NPO   DVT ppx: holding in setting of bleed  Dispo: [address] [place], [place] [id number]

## 2024-03-21 NOTE — SWALLOW BEDSIDE ASSESSMENT ADULT - ORAL PHASE
Prolonged mastication and bolus manipulation. Liquid wash frequently utilized, particularly with dry solids.

## 2024-03-21 NOTE — ED PROVIDER NOTE - NS ED ROS FT
CONSTITUTIONAL: No fever, no chills, +fatigue  EYES: No eye redness, no visual changes  ENT: No ear pain, no sore throat  CARDIOVASCULAR: No chest pain, no palpitations  RESPIRATORY: No cough, no SOB  GI: No abdominal pain, no nausea, no vomiting, no constipation, no diarrhea  GENITOURINARY: No dysuria, no frequency, no hematuria  MUSCULOSKELETAL: No back pain, no joint pain, no myalgias  SKIN: No rash, no peripheral edema  NEURO: No headache, +confusion    ALL OTHER SYSTEMS NEGATIVE.

## 2024-03-21 NOTE — PROGRESS NOTE ADULT - PROBLEM SELECTOR PLAN 5
On admission, lactate 3.0 s/p 2.5L of fluid (1L at home and 1.5 LR in ED). Repeat lactate 2.0.     - Treat as stated above History of urinary retention, likely exacerbated by constipation. Briseno placed in the ED 3/21.     Plan:  - Continue with bowel regimen as stated above

## 2024-03-21 NOTE — PROGRESS NOTE ADULT - PROBLEM SELECTOR PLAN 6
Presenting w/ prolonged h/o back pain, follows with Dr. Stevens (ortho) outpatient. Was referred to Dr. Morley for pain management and received injections, with minimal relief.   -CT Thoracic and Lumbar Spine 3/4/2024: Since 4/19/2022, increased depression along the superior and inferior endplate of L2 now w/ moderate compression deformity, previously mild compression deformity. Cannot exclude acute on chronic compression deformity. Chronic severe compression deformities again noted at T12 and L1 with   mild to moderate chronic compression deformity at L3 and L4, all unchanged. Multilevel thoracic compression deformities with severe deformity at T3 and T12, moderate deformity at T7 and mild deformity at T9. At T9 there is moderate canal stenosis secondary to disc bulge and mild bony retropulsion.    Plan:  - PT consult (multiple falls at home)  - Start gabapentin 100mg PO qd for back pain  - Tramadol 25mg PO BID for pain  - Standing Tylenol 650mg PO q6h  - Palliative consult  - Follow up outpatient with ortho and pain management

## 2024-03-21 NOTE — CONSULT NOTE ADULT - SUBJECTIVE AND OBJECTIVE BOX
HPI: 89F PMH Breast Cx s/p R mastectomy, urinary retention, thoracolumbar compression fractures, chronic anemia, mild dementia/cognitive impairment, recent admission for anemia requiring transfusion and spinal compression fractures BIBEMS for hypotension at home. Patient herself is poor historian however per ED documentation reportedly has been having 2 weeks of declining mental status, confusion, fatigue, constipation, and back pain. Was seen day of admission by [facility] medicine provider and found to be hypotensive, tachycardic (new A-fib), and hemoccult positive on rectal exam. She was given 1 g of ceftriaxone and 1L IVF at home and transported to ED. Patient herself currently endorsing back pain, abdominal pain, mild nausea and 2 episodes of vomiting 4-5 days ago. LBM 4-5 days ago. Denies any bleeding from anywhere, no blood in urine/stool/vomit, no dark stools. Denies chest pain, SOB, or fevers at home. Reports she lives alone at home.   In the ED, patient found to be altered mental status and in new afib with RVR however afebrile and normotensive. Hgb found to be 2.9, given 2 units pRBCs and 2.5L LR with improvement in mental status back to baseline and hgb 5.1.       PHYSICAL EXAM:    General: lying in bed in NAD   HEENT: NC/AT; PERRL, anicteric sclera; dry MM  Neck: supple  Cardiovascular: +S1/S2, irregular rhythm, tachy rate   Respiratory: CTA B/L; no W/R/R  Gastrointestinal: soft, distended, +TTP lower quadrants, +BS   Extremities: WWP; no edema, clubbing or cyanosis  Neurological: AAOx3; no focal deficits  Dermatologic: no appreciable wounds or damage to the skin    VITAL SIGNS:  Vital Signs Last 24 Hrs  T(C): 37.1 (21 Mar 2024 02:21), Max: 37.3 (21 Mar 2024 01:52)  T(F): 98.8 (21 Mar 2024 02:21), Max: 99.2 (21 Mar 2024 01:52)  HR: 104 (21 Mar 2024 02:21) (93 - 109)  BP: 117/55 (21 Mar 2024 02:21) (92/50 - 131/59)  BP(mean): --  RR: 16 (21 Mar 2024 02:21) (16 - 17)  SpO2: 95% (21 Mar 2024 02:21) (94% - 98%)    Parameters below as of 21 Mar 2024 02:21  Patient On (Oxygen Delivery Method): nasal cannula  O2 Flow (L/min): 2        MEDICATIONS:  MEDICATIONS  (STANDING):  pantoprazole Infusion 8 mG/Hr (10 mL/Hr) IV Continuous <Continuous>    MEDICATIONS  (PRN):      ALLERGIES:  Allergies    No Known Allergies    Intolerances        LABS:                        5.1    14.26 )-----------( 213      ( 21 Mar 2024 03:16 )             16.2     03-20    135  |  100  |  52<H>  ----------------------------<  144<H>  4.4   |  23  |  1.05    Ca    8.1<L>      20 Mar 2024 23:06    TPro  4.8<L>  /  Alb  2.8<L>  /  TBili  0.2  /  DBili  x   /  AST  16  /  ALT  9<L>  /  AlkPhos  63  03-20    PT/INR - ( 20 Mar 2024 23:06 )   PT: 12.3 sec;   INR: 1.08          PTT - ( 20 Mar 2024 23:06 )  PTT:24.2 sec  Urinalysis Basic - ( 21 Mar 2024 00:49 )    Color: Yellow / Appearance: Clear / S.017 / pH: x  Gluc: x / Ketone: Trace mg/dL  / Bili: Negative / Urobili: 0.2 mg/dL   Blood: x / Protein: Negative mg/dL / Nitrite: Negative   Leuk Esterase: Trace / RBC: 2 /HPF / WBC 8 /HPF   Sq Epi: x / Non Sq Epi: 4 /HPF / Bacteria: Negative /HPF      CAPILLARY BLOOD GLUCOSE      POCT Blood Glucose.: 164 mg/dL (20 Mar 2024 23:15)      RADIOLOGY & ADDITIONAL TESTS: Reviewed. HPI: 89F PMH Breast Cx s/p R mastectomy, urinary retention, thoracolumbar compression fractures, chronic anemia, mild dementia/cognitive impairment, recent admission for anemia requiring transfusion and spinal compression fractures BIBEMS for hypotension at home. Patient herself is poor historian however per ED documentation reportedly has been having 2 weeks of declining mental status, confusion, fatigue, constipation, and back pain. Was seen day of admission by [facility] medicine provider and found to be hypotensive, tachycardic (new A-fib), and hemoccult positive on rectal exam. She was given 1 g of ceftriaxone and 1L IVF at home and transported to ED. Patient herself currently endorsing back pain, abdominal pain, mild nausea and 2 episodes of vomiting 4-5 days ago. LBM 4-5 days ago. Denies any bleeding from anywhere, no blood in urine/stool/vomit, no dark stools. Denies chest pain, SOB, or fevers at home. Reports she lives alone at home.   Of note patient with recent admission to [facility] from 3/4-3/5/24, at that time found to be anemic to 6.3 requiring transfusion and FOBT+, GI consulted, patient did not want to pursue scope with GI to investigate cause of bleeding inpatient. GI notified to follow up outpatient. Goals of care discussed in depth with patient aware of risks of bleeding so was discharged with outpatient follow up however has since not been scoped.  In the ED, patient found to be altered mental status and in new afib with RVR however afebrile and normotensive. Hgb found to be 2.9, given 2 units pRBCs and 2.5L LR with improvement in mental status back to baseline and hgb 5.1.       PHYSICAL EXAM:    General: pale, lying in bed in NAD   HEENT: NC/AT; PERRL, anicteric sclera; dry MM  Neck: supple  Cardiovascular: +S1/S2, irregular rhythm, tachy rate   Respiratory: CTA B/L; no W/R/R  Gastrointestinal: soft, distended, +TTP lower quadrants, +BS   Extremities: cool LEs; no edema, clubbing or cyanosis  Neurological: AAOx3; no focal deficits  Dermatologic: no appreciable wounds or damage to the skin    VITAL SIGNS:  Vital Signs Last 24 Hrs  T(C): 37.1 (21 Mar 2024 02:21), Max: 37.3 (21 Mar 2024 01:52)  T(F): 98.8 (21 Mar 2024 02:21), Max: 99.2 (21 Mar 2024 01:52)  HR: 104 (21 Mar 2024 02:21) (93 - 109)  BP: 117/55 (21 Mar 2024 02:21) (92/50 - 131/59)  BP(mean): --  RR: 16 (21 Mar 2024 02:21) (16 - 17)  SpO2: 95% (21 Mar 2024 02:21) (94% - 98%)    Parameters below as of 21 Mar 2024 02:21  Patient On (Oxygen Delivery Method): nasal cannula  O2 Flow (L/min): 2        MEDICATIONS:  MEDICATIONS  (STANDING):  pantoprazole Infusion 8 mG/Hr (10 mL/Hr) IV Continuous <Continuous>    MEDICATIONS  (PRN):      ALLERGIES:  Allergies    No Known Allergies    Intolerances        LABS:                        5.1    14.26 )-----------( 213      ( 21 Mar 2024 03:16 )             16.2     03-20    135  |  100  |  52<H>  ----------------------------<  144<H>  4.4   |  23  |  1.05    Ca    8.1<L>      20 Mar 2024 23:06    TPro  4.8<L>  /  Alb  2.8<L>  /  TBili  0.2  /  DBili  x   /  AST  16  /  ALT  9<L>  /  AlkPhos  63  03-20    PT/INR - ( 20 Mar 2024 23:06 )   PT: 12.3 sec;   INR: 1.08          PTT - ( 20 Mar 2024 23:06 )  PTT:24.2 sec  Urinalysis Basic - ( 21 Mar 2024 00:49 )    Color: Yellow / Appearance: Clear / S.017 / pH: x  Gluc: x / Ketone: Trace mg/dL  / Bili: Negative / Urobili: 0.2 mg/dL   Blood: x / Protein: Negative mg/dL / Nitrite: Negative   Leuk Esterase: Trace / RBC: 2 /HPF / WBC 8 /HPF   Sq Epi: x / Non Sq Epi: 4 /HPF / Bacteria: Negative /HPF      CAPILLARY BLOOD GLUCOSE      POCT Blood Glucose.: 164 mg/dL (20 Mar 2024 23:15)      RADIOLOGY & ADDITIONAL TESTS: Reviewed. No

## 2024-03-21 NOTE — PATIENT PROFILE ADULT - HOME ACCESSIBILITY CONCERNS
Pt calling back to discuss some more things about this call, [phone]. She was saying she was going to come by to see you.   none

## 2024-03-21 NOTE — H&P ADULT - PROBLEM SELECTOR PLAN 2
P/w hgb 2.9 and hct 19 on admission, s/p 2u pRBC with appropriate response. No obvious s/s of bleeding, with CTAP negative for bleed.   Last admission 5/4/2024 for anemia (hgb 6.7) and denied EGD/C-scope with GI for further evaluation. GI notified to follow up outpatient. Last C-scope was 15 years ago, normal findings with no hx of endoscopy.     - Order another 2units pRBC   - Consult GI   - Palliative care (GOC was said to be discussed during previous admission, but cannot find prior MOLST or documentation)  - F/u iron studies, fibrinogen, LDH, reticulocyte count, haptoglobin, B12 and folate   - Transition to 40 mg IV pantoprazole BID   - maintain active T&S, two large bore IVs, transfuse for hgb< 7

## 2024-03-21 NOTE — H&P ADULT - PROBLEM SELECTOR PLAN 1
On admission meeting 2/4 SIRS criteria with  and WBC 13.69. Received 1L fluid and CTX at home and 1.5 LR on admission. No signs of infection. RVP and UA negative, CXR unremarkable.     - Monitor off of abx   - F/u blood and urine cx

## 2024-03-21 NOTE — PROGRESS NOTE ADULT - PROBLEM SELECTOR PLAN 1
Presents w/ HgB 2.9 and HCT 19 on admission, s/p 2u pRBC with appropriate response. No obvious s/s of bleeding, with CTAP negative for bleed. Last admission 5/4/2023 for anemia HgB 6.7 and denied EGD/C-scope with GI for further evaluation. GI notified to follow up outpatient. Last C-scope was 15 years ago, normal findings with no h/o endoscopy.   -GOC Discussion: Patient stated she would not want to undergo EGD; most important goal is to be at home  -, B9/12 WNL. Reticulocyte count WNL, % increased (appropriate response)    Plan:  - Follow up additional CBC s/p 2 additional units pRBCs   - Palliative care consult for formal GOC discussion, pain management  - Transition to 40 mg IV pantoprazole BID   - maintain active T&S, two large bore IVs, transfuse for hgb< 7 Presents w/ HgB 2.9 and HCT 19 on admission, s/p 2u pRBC with appropriate response. On admission, lactate 3.0 s/p 2.5L of fluid (1L at home and 1.5 LR in ED). Repeat lactate 2.0. No obvious s/s of bleeding, with CTAP negative for bleed. Last admission 5/4/2023 for anemia HgB 6.7 and denied EGD/C-scope with GI for further evaluation. GI notified to follow up outpatient. Last C-scope was 15 years ago, normal findings with no h/o endoscopy.   -GOC Discussion: Patient stated she would not want to undergo EGD; most important goal is to be at home  -, B9/12 WNL. Reticulocyte count WNL, % increased (appropriate response)    Plan:  - Follow up additional CBC s/p 2 additional units pRBCs   - Palliative care consult for formal GOC discussion, pain management  - Transition to 40 mg IV pantoprazole BID   - Maintain active T&S, two large bore IVs, transfuse for HgB<7 Presents w/ HgB 2.9 and HCT 19 on admission, s/p 2u pRBC with appropriate response. On admission, lactate 3.0 s/p 2.5L of fluid (1L at home and 1.5 LR in ED). Repeat lactate 2.0. No obvious s/s of bleeding, with CTAP negative for bleed. Last admission 5/4/2023 for anemia HgB 6.7 and denied EGD/C-scope with GI for further evaluation. GI notified to follow up outpatient. Last C-scope was 15 years ago, normal findings with no h/o endoscopy.   -GOC Discussion: Patient stated she would not want to undergo EGD; most important goal is to be at home and for pain control  -, B9/12 WNL. Reticulocyte count WNL, % increased (appropriate response)    Plan:  - Follow up additional CBC s/p 2 additional units pRBCs   - Palliative care consult for formal GOC discussion, pain management  - Transition to 40 mg IV pantoprazole BID   - Maintain active T&S, two large bore IVs, transfuse for HgB<7 Presents w/ HgB 2.9 and HCT 19 on admission, s/p 2u pRBC with appropriate response. On admission, lactate 3.0 s/p 2.5L of fluid (1L at home and 1.5 LR in ED). Repeat lactate 2.0. No obvious s/s of bleeding, with CTAP negative for bleed. Last admission 5/4/2023 for anemia HgB 6.7 and denied EGD/C-scope with GI for further evaluation. GI notified to follow up outpatient. Last C-scope was 15 years ago, normal findings with no h/o endoscopy.   -GOC Discussion: Patient stated she would not want to undergo EGD; most important goal is to be at home and for pain control  -, B9/12 WNL. Reticulocyte count WNL, % increased (appropriate response)    Plan:  - HgB s/p 2 additional units pRBCs   - Palliative care consult for formal GOC discussion, pain management  - Transition to 40 mg IV pantoprazole BID   - Maintain active T&S, two large bore IVs, transfuse for HgB<7

## 2024-03-21 NOTE — ED PROVIDER NOTE - PHYSICAL EXAMINATION
CONSTITUTIONAL: Non-toxic; in no apparent distress, + pale, + confused AAOx2-3  HEAD: Normocephalic; atraumatic  EYES: PERRL; EOM intact   ENMT: External appears normal  NECK: Supple; non-tender  CARD: + tachycardic, irreg/irreg; no murmurs, rubs, or gallops  RESP: Normal chest excursion with respiration; breath sounds clear and equal bilaterally  ABD: Soft, non-distended; non-tender  EXT: Normal ROM in all four extremities; non-tender to palpation, no peripheral edema  SKIN: Warm, dry, no rash, + pale  NEURO:  No focal neurological deficiencies CONSTITUTIONAL: Non-toxic; in no apparent distress, + pale, + confused AAOx1-2  HEAD: Normocephalic; atraumatic  EYES: PERRL; EOM intact   ENMT: External appears normal  NECK: Supple; non-tender  CARD: + tachycardic, irreg/irreg; no murmurs, rubs, or gallops  RESP: Normal chest excursion with respiration; breath sounds clear and equal bilaterally  ABD: Soft, non-distended; non-tender  EXT: Normal ROM in all four extremities; non-tender to palpation, no peripheral edema  SKIN: Warm, dry, no rash, + pale  NEURO:  No focal neurological deficiencies

## 2024-03-21 NOTE — SWALLOW BEDSIDE ASSESSMENT ADULT - SLP GENERAL OBSERVATIONS
Patient was seen fully awake and alert, HOB fully elevated, on room air. A&Ox3, followed simple directives, and communicated wants/needs. Dry mouth noted at baseline. Fluent and intelligible speech. Moderate rough vocal quality.

## 2024-03-21 NOTE — PROGRESS NOTE ADULT - PROBLEM SELECTOR PLAN 4
Patient brought to the ED on 3/13 found to be constipated and had a small BM after enema. XR was negative for air fluid levels. Discharged from ED on bowel regimen. On admission 3/20, had not had BM in 6 days. RN reports 1 small BM 3/21 AM.  -CTAP significant for stool burden, no evidence of hemorrhage    Plan:  - Miralax TID, senna 2 tabs before bed   - Dulcolax enema daily

## 2024-03-21 NOTE — PROGRESS NOTE ADULT - PROBLEM SELECTOR PLAN 2
On admission meeting 2/4 SIRS criteria with  and WBC 13.69. Received 1L fluid and CTX at home and 1.5 LR on admission. No signs of infection. RVP and UA negative, CXR unremarkable.     - Monitor off of antibiotics  - Follow up blood, urine cultures

## 2024-03-21 NOTE — SWALLOW BEDSIDE ASSESSMENT ADULT - PHARYNGEAL PHASE
Seemingly age appropriate timely swallow, hyolaryngeal movement appreciated, with no clinical indicators of penetration/aspiration. No gross clinical indicators of pharyngeal inefficiency.

## 2024-03-21 NOTE — H&P ADULT - NSHPPHYSICALEXAM_GEN_ALL_CORE
General: pale, lying in bed in NAD   HEENT: NC/AT; PERRL, anicteric sclera; dry MM  Neck: supple  Cardiovascular: +S1/S2, irregular rhythm, tachy rate   Respiratory: CTA B/L; no W/R/R  Gastrointestinal: soft, distended, +TTP lower quadrants, +BS   Extremities: cool LEs; no edema, clubbing or cyanosis  Neurological: AAOx3; no focal deficits  Dermatologic: no appreciable wounds or damage to the skin General: pale, lying in bed in NAD   HEENT: NC/AT; PERRL, anicteric sclera; dry MM, glasses  Neck: supple  Cardiovascular: +S1/S2, regular ryhthm, tachy  Respiratory: CTA B/L; no W/R/R, speakingin full sentences  Gastrointestinal: soft, distended, +TTP lower quadrants, +BS   Extremities: warm and dry; no edema, clubbing or cyanosis, 1+ LE edena  Neurological: AAOx3; sometimes confused, moving all extremities  Dermatologic: no appreciable wounds or damage to the skin  Psych: calm

## 2024-03-21 NOTE — PROGRESS NOTE ADULT - PROBLEM SELECTOR PLAN 1
Presents w/ HgB 2.9 and HCT 19 on admission, s/p 2u pRBC with appropriate response. On admission, lactate 3.0 s/p 2.5L of fluid (1L at home and 1.5 LR in ED). Repeat lactate 2.0. No obvious s/s of bleeding, with CTAP negative for bleed. Last admission 5/4/2023 for anemia HgB 6.7 and denied EGD/C-scope with GI for further evaluation. GI notified to follow up outpatient. Last C-scope was 15 years ago, normal findings with no h/o endoscopy.   -GOC Discussion: Patient stated she would not want to undergo EGD; most important goal is to be at home and for pain control  -, B9/12 WNL. Reticulocyte count WNL, % increased (appropriate response). s/p 4 units pRBC    Plan:  - transfuse goal Hb >7, >8 if hemodynamically unstable bleed  - Palliative care consult for formal GOC discussion, pain management  - c/w 40 mg IV pantoprazole BID  - Maintain active T&S, two large bore IVs

## 2024-03-21 NOTE — PHYSICAL THERAPY INITIAL EVALUATION ADULT - DID THE PATIENT HAVE SURGERY?
Pt unable to get into psychiatrist (enma flores)until 4/1 and needs refill on ativan. Has not had med for 24 hrs. Explained to pt he may need to go to ER to prevent withdrawal.  
n/a

## 2024-03-21 NOTE — H&P ADULT - PROBLEM SELECTOR PLAN 5
On admission, lactate 3.0 s/p 2.5L of fluid (1L at home and 1.5 LR in ED). Repeat lactate 2.0.     - Treat as stated above

## 2024-03-21 NOTE — PROGRESS NOTE ADULT - PROBLEM SELECTOR PLAN 2
On admission meeting 2/4 SIRS criteria with  and WBC 13.69. Received 1L fluid and CTX at home and 1.5 LR on admission. No signs of infection. RVP and UA negative, CXR unremarkable.     - Monitor off of abx   - F/u blood and urine cx On admission meeting 2/4 SIRS criteria with  and WBC 13.69. Received 1L fluid and CTX at home and 1.5 LR on admission. No signs of infection. RVP and UA negative, CXR unremarkable.     - Monitor off of antibiotics  - Follow up blood, urine cultures

## 2024-03-21 NOTE — PROGRESS NOTE ADULT - PROBLEM SELECTOR PLAN 7
F: None  E: Replete as needed  N: Soft and bite-sized per S&S  DVT ppx: Holding i/s/o bleed  GI ppx: protonix  Dispo: F  CODE STATUS: DNR/DNI

## 2024-03-21 NOTE — H&P ADULT - PROBLEM SELECTOR PLAN 7
History of urinary retention, likely exacerbated by constipation. Briseno placed in the ED 3/21.     - Continue with bowel regimen as stated above

## 2024-03-21 NOTE — SWALLOW BEDSIDE ASSESSMENT ADULT - SWALLOW EVAL: RECOMMENDED FEEDING/EATING TECHNIQUES
meds with puree as able/alternate food with liquid/hard swallow w/ each bite or sip/maintain upright posture during/after eating for 30 mins/position upright (90 degrees)

## 2024-03-21 NOTE — H&P ADULT - PROBLEM SELECTOR PLAN 9
F: S/p 1L fluid at home; S/p1500 LR in ED   E: replete as needed  N: NPO for possible scope by GI   DVT ppx: Holding iso bleed  Dispo: tele   CODE STATUS:

## 2024-03-21 NOTE — PROGRESS NOTE ADULT - PROBLEM SELECTOR PLAN 4
Troponin on admission 81 --> 83, likely 2/2 demand ischemia. No chest pain or palpitations on exam.   ECG sinus with APCs    - Trend until cleared Patient brought to the ED on 3/13 found to be constipated and had a small BM after enema. XR was negative for air fluid levels. Discharged from ED on bowel regimen. On admission 3/20, had not had BM in 6 days. RN reports 1 small BM 3/21 AM.  -CTAP significant for stool burden, no evidence of hemorrhage    Plan:  - Miralax TID, senna 2 tabs before bed   - Dulcolax enema daily

## 2024-03-21 NOTE — H&P ADULT - PROBLEM SELECTOR PLAN 8
Presenting w/ one week of back pain, follows with Dr. Stevens (ortho) outpatient. Was referred to Dr. Morley for pain management and received injections, with minimal relief.   CT Thoracic and Lumbar Spine 5/4/2024: Since 4/19/2022, increased depression along the superior and inferior endplate of L2 now w/ moderate compression deformity, previously mild compression deformity. Cannot exclude acute on chronic compression deformity. Chronic severe compression deformities again noted at T12 and L1 with   mild to moderate chronic compression deformity at L3 and L4, all unchanged. Multilevel thoracic compression deformities with severe deformity at T3 and T12, moderate deformity at T7 and mild deformity at T9. At T9 there is moderate canal stenosis secondary to disc bulge and mild bony retropulsion.    - Was discharged with home PT, but has not had PT come to her house   - Pain control with Tylenol 650 mg q6hr  - PT consult   - Follow up outpatient with ortho and pain management as discussed previously

## 2024-03-21 NOTE — SWALLOW BEDSIDE ASSESSMENT ADULT - DIET PRIOR TO ADMI
regular consistency and thin liquids, however, reported reduced oral intake over the last week. She reported increased swallowing difficulty.

## 2024-03-21 NOTE — CHART NOTE - NSCHARTNOTEFT_GEN_A_CORE
[facility]  Kun [facility] Attending  Contact Info: Call [phone] (HEAL Line) or message on Microsoft Teams    HPI:   89F PMH Breast Cx s/p R mastectomy, urinary retention, thoracolumbar compression fractures, chronic anemia, mild dementia/cognitive impairment, recent admission for anemia requiring transfusion and spinal compression fractures BIBEMS for hypotension at home. Patient herself is poor historian however per ED documentation reportedly has been having 2 weeks of declining mental status, confusion, fatigue, constipation, and back pain. Was seen day of admission by [facility] medicine provider and found to be hypotensive, tachycardic (new A-fib), and hemoccult positive on rectal exam. She was given 1 g of ceftriaxone and 1L IVF at home and transported to ED. Patient herself currently endorsing back pain, abdominal pain, mild nausea and 2 episodes of vomiting 4-5 days ago. LBM 4-5 days ago. Denies any bleeding from anywhere, no blood in urine/stool/vomit, no dark stools. Denies chest pain, SOB, or fevers at home. Reports she lives alone at home.     ED Course:   Vitals: T 98.7  /52 RR 16 saturating 98% on RA --> 98% on 2L NC  Labs: WBC 13.69 hgb 2.9 Hct 9.4  Trop 81 Lactate 30 BUN 52   Imaging:   ·	CT Angio A/P: No evidence of GI hemorrhage on this study. Constipation.  ·	CT Head: No hydrocephalus, midline shift, acute intracranial hemorrhage or demarcated territorial infarct.  Interventions: Pantoprazole 80 mg IV and PPI gtt, 2L LR + 500 mL LR, 2u pRB   (21 Mar 2024 05:30)    PERTINENT PM/SXH:   Breast CA    FallsT(C): 36.9 (21 Mar 2024 14:17), Max: 37.3 (21 Mar 2024 01:52)  T(F): 98.4 (21 Mar 2024 14:17), Max: 99.2 (21 Mar 2024 01:52)  HR: 84 (21 Mar 2024 16:00) (82 - 109)  BP: 119/58 (21 Mar 2024 16:00) (92/50 - 131/59)  BP(mean): 84 (21 Mar 2024 16:00) (81 - 85)  RR: 18 (21 Mar 2024 16:00) (16 - 18)  SpO2: 97% (21 Mar 2024 16:00) (94% - 98%)    Multiple falls    Back pain    Urinary retention    Osteoporosis    Macrocytic anemia    Macrocytosis      H/O mastectomy, right    No significant past surgical history      FAMILY HISTORY:  No pertinent family history in first degree relatives      ITEMS NOT CHECKED ARE NOT PRESENT    SOCIAL HISTORY:   Significant other/partner:  []  Children:  []   Substance hx:  []   Tobacco hx:  []   Alcohol hx: []   Home Opioid hx:  [] I-Stop Reference No:  - no active Rx's / see chart note  Living Situation: [x]Home  []Long term care  []Rehab []Other  Shinto/Spiritual practice: ; Role of organized Yarsanism [ ] important [ ] some [ ] unable to assess  Coping: [ ] well [ ] with difficulty [ ] poor coping [ ] unable to assess  Support system: [ ] strong [x ] adequate [ ] inadequate    ADVANCE DIRECTIVES:    []MOLST  []Living Will  DECISION MAKER(s):  [x] Health Care Proxy(s)  [] Surrogate(s)  [] Guardian           Name(s)/Phone Number(s): Joe Laura (nephew)    BASELINE (I)ADLs (prior to admission):  [place]: []Total  [] Moderate []Dependent    ALLERGIES:  No Known Allergies    MEDICATIONS  (STANDING):    MEDICATIONS  (PRN):    PRESENT SYMPTOMS: []Unable to obtain due to poor mentation/encephalopathy  Source if other than patient:  []Family   []Team     Pain: [ ] yes [x ] no  QOL impact -   Location -                    Aggravating Factors -  Quality -  Radiation -  Timing -  Severity (0-10 scale) -   Minimal Acceptable Level (0-10 scale) -    PAIN AD Score:  [email or web address] (press ctrl +  left click to view)    Dyspnea:                           [ x]Mild  [ ]Moderate [ ]Severe  Anxiety:                             [ x]Mild [ ]Moderate [ ]Severe  Fatigue:                             [ ]Mild [ ]Moderate [ x]Severe  Nausea:                             [ ]Mild [ ]Moderate [ ]Severe  Loss of Appetite:             [ ]Mild [ ]Moderate [ ]Severe  Constipation:                   [ ]Mild [ ]Moderate [ ]Severe    Other Symptoms:  [x ]All other review of systems negative     Palliative Performance Status Version 2: 40 %    http://npcrc.org/files/news/palliative_performance_scale_[email or web address].pdf    PHYSICAL EXAM:  GENERAL:  [x ]Alert  [ x]Oriented x   3[ ]Lethargic  [ x]Cachexia  [ ]Unarousable  [ ]Verbal  [ ]Non-Verbal  Behavioral:   [ ]Anxiety  [ ]Delirium [ ]Agitation [x ]Cooperative  HEENT:  [ ]Normal   [ x]Dry mouth   [ ]ET Tube/Trach  [ ]Oral lesions  PULMONARY:   x[ ]Clear []Tachypnea  []Audible excessive secretions   [ ]Rhonchi        [ ]Right [ ]Left [ ]Bilateral  [ ]Crackles        [ ]Right [ ]Left [ ]Bilateral  [ ]Wheezing     [ ]Right [ ]Left [ ]Bilateral  CARDIOVASCULAR:    [x ]Regular [ ]Irregular [ ]Tachy  [ ]Lee [ ]Murmur [ ]Other  GASTROINTESTINAL:  [x ]Soft  [ ]Distended   [x ]+BS  [ x]Non tender [ ]Tender  [ ]PEG [ ]OGT/ NGT  Last BM:   GENITOURINARY:  [ ]Normal [ ] Incontinent   [ ]Oliguria/Anuria   [x ]Flores  MUSCULOSKELETAL:   [ ]Normal   [ ]xWeakness  [ ]Bed/Wheelchair bound [ ]Edema  NEUROLOGIC:   [ x]No focal deficits  [ ]Cognitive impairment  [ ]Dysphagia [ ]Dysarthria [ ]Paresis [ ]Encephalopathic   SKIN:   [x ]Normal   [ ]Pressure ulcer(s)  [ ]Rash    CRITICAL CARE:  [ ]Shock Present  [ ]Septic [ ]Cardiogenic [ ]Neurologic [ ]Hypovolemic  [ ]Vasopressors [ ]Inotropes   [ ]Respiratory failure present [ ]Mechanical Ventilation [ ]Non-invasive ventilatory support [ ]High-Flow  [ ]Acute  [ ]Chronic [ ]Hypoxic  [ ]Hypercarbic  [ ]Other organ failure    Vital Signs Last 24 Hrs       I&O's Summary      LABS:          RADIOLOGY & ADDITIONAL STUDIES:      PROTEIN CALORIE MALNUTRITION PRESENT: [ ]mild [ ]moderate [ ]severe [ ]underweight [ ]morbid obesity  [ ]PPSV2 < or = to 30% [ ]significant weight loss  [ ]poor nutritional intake [ ]catabolic state [ ]anasarca     Artificial Nutrition [ ]     REFERRALS:  [x]Social Work  [ ]Case management [ ]PT/OT [ ]Chaplaincy  [ ]Hospice  [ ]Patient/Family Support    DISCUSSION OF CASE: Family - to obtain additional history and to provide emotional support; ( ) -    AP: 89 F with numerous comorbidities, presented with acute blood loss anemia, urinary retention, debility, encounter for palliative care.    1) Acute Blood Loss Anemia:  - s/p 4 units PRBC.  - H and H has since been stable.  - No plans for endoscopic evaluation at this time.  2) Urinary Retention:  - Remains with flores.  - TOV tomorrow.  3) Debility:  - PPSV2 is 40%.  - Assist with ADLs.  - Patient has HHA's at home.  - Plans to reinstate prior to discharge.  4) Encounter for Palliative Care:  - .  Complex medical decision making / symptom management in the setting of Advance Illness.  Patient does not meet criteria for hospice services.     Will continue to follow for ongoing GOC discussion / titration of palliative regimen. Emotional support provided, questions answered.  Active Psychosocial Referrals:  [x]Social Work/Case management []PT/OT []Chaplaincy []Hospice  []Patient/Family Support []Holistic RN []Massage Therapy []Music Therapy []Ethics  Coping: [] well [] with difficulty [] poor coping [] unable to assess  Support system: [] strong [] adequate [] inadequate    For new or uncontrolled symptoms, please call Palliative Care at [phone] ([phone]). The service is available 24/7 (including nights & weekends) to provide symptom management recommendations over the phone as appropriate    5) Advanced Care Planning:  - 19 minutes spent with patient at bedside. HCP identified. Pina Laura.   MOLST form filled out. DNR/DNI.  Does not meet hospice criteria.  Still interested in continuing disease targeted therapies and still wants to be rehospitalized if needed.

## 2024-03-22 ENCOUNTER — TRANSCRIPTION ENCOUNTER (OUTPATIENT)
Age: 89
End: 2024-03-22

## 2024-03-22 DIAGNOSIS — R41.82 ALTERED MENTAL STATUS, UNSPECIFIED: ICD-10-CM

## 2024-03-22 DIAGNOSIS — Z29.9 ENCOUNTER FOR PROPHYLACTIC MEASURES, UNSPECIFIED: ICD-10-CM

## 2024-03-22 DIAGNOSIS — D64.9 ANEMIA, UNSPECIFIED: ICD-10-CM

## 2024-03-22 LAB
ANION GAP SERPL CALC-SCNC: 9 MMOL/L — SIGNIFICANT CHANGE UP (ref 5–17)
BASOPHILS # BLD AUTO: 0.03 K/UL — SIGNIFICANT CHANGE UP (ref 0–0.2)
BASOPHILS NFR BLD AUTO: 0.3 % — SIGNIFICANT CHANGE UP (ref 0–2)
BUN SERPL-MCNC: 37 MG/DL — HIGH (ref 7–23)
CALCIUM SERPL-MCNC: 8 MG/DL — LOW (ref 8.4–10.5)
CHLORIDE SERPL-SCNC: 107 MMOL/L — SIGNIFICANT CHANGE UP (ref 96–108)
CO2 SERPL-SCNC: 24 MMOL/L — SIGNIFICANT CHANGE UP (ref 22–31)
CREAT SERPL-MCNC: 1 MG/DL — SIGNIFICANT CHANGE UP (ref 0.5–1.3)
CULTURE RESULTS: NO GROWTH — SIGNIFICANT CHANGE UP
EGFR: 53 ML/MIN/1.73M2 — LOW
EOSINOPHIL # BLD AUTO: 0.53 K/UL — HIGH (ref 0–0.5)
EOSINOPHIL NFR BLD AUTO: 5.7 % — SIGNIFICANT CHANGE UP (ref 0–6)
GLUCOSE SERPL-MCNC: 98 MG/DL — SIGNIFICANT CHANGE UP (ref 70–99)
HCT VFR BLD CALC: 23.2 % — LOW (ref 34.5–45)
HGB BLD-MCNC: 8 G/DL — LOW (ref 11.5–15.5)
IMM GRANULOCYTES NFR BLD AUTO: 1.3 % — HIGH (ref 0–0.9)
LYMPHOCYTES # BLD AUTO: 1.12 K/UL — SIGNIFICANT CHANGE UP (ref 1–3.3)
LYMPHOCYTES # BLD AUTO: 12.1 % — LOW (ref 13–44)
MAGNESIUM SERPL-MCNC: 2.4 MG/DL — SIGNIFICANT CHANGE UP (ref 1.6–2.6)
MCHC RBC-ENTMCNC: 31.5 PG — SIGNIFICANT CHANGE UP (ref 27–34)
MCHC RBC-ENTMCNC: 34.5 GM/DL — SIGNIFICANT CHANGE UP (ref 32–36)
MCV RBC AUTO: 91.3 FL — SIGNIFICANT CHANGE UP (ref 80–100)
MONOCYTES # BLD AUTO: 0.73 K/UL — SIGNIFICANT CHANGE UP (ref 0–0.9)
MONOCYTES NFR BLD AUTO: 7.9 % — SIGNIFICANT CHANGE UP (ref 2–14)
NEUTROPHILS # BLD AUTO: 6.76 K/UL — SIGNIFICANT CHANGE UP (ref 1.8–7.4)
NEUTROPHILS NFR BLD AUTO: 72.7 % — SIGNIFICANT CHANGE UP (ref 43–77)
NRBC # BLD: 0 /100 WBCS — SIGNIFICANT CHANGE UP (ref 0–0)
PHOSPHATE SERPL-MCNC: 2.9 MG/DL — SIGNIFICANT CHANGE UP (ref 2.5–4.5)
PLATELET # BLD AUTO: 225 K/UL — SIGNIFICANT CHANGE UP (ref 150–400)
POTASSIUM SERPL-MCNC: 3.8 MMOL/L — SIGNIFICANT CHANGE UP (ref 3.5–5.3)
POTASSIUM SERPL-SCNC: 3.8 MMOL/L — SIGNIFICANT CHANGE UP (ref 3.5–5.3)
RBC # BLD: 2.54 M/UL — LOW (ref 3.8–5.2)
RBC # FLD: 19.3 % — HIGH (ref 10.3–14.5)
SODIUM SERPL-SCNC: 140 MMOL/L — SIGNIFICANT CHANGE UP (ref 135–145)
SPECIMEN SOURCE: SIGNIFICANT CHANGE UP
TROPONIN T, HIGH SENSITIVITY RESULT: 60 NG/L — CRITICAL HIGH (ref 0–51)
WBC # BLD: 9.29 K/UL — SIGNIFICANT CHANGE UP (ref 3.8–10.5)
WBC # FLD AUTO: 9.29 K/UL — SIGNIFICANT CHANGE UP (ref 3.8–10.5)

## 2024-03-22 PROCEDURE — 99233 SBSQ HOSP IP/OBS HIGH 50: CPT | Mod: GC

## 2024-03-22 RX ORDER — PANTOPRAZOLE SODIUM 20 MG/1
40 TABLET, DELAYED RELEASE ORAL EVERY 12 HOURS
Refills: 0 | Status: DISCONTINUED | OUTPATIENT
Start: 2024-03-22 | End: 2024-03-23

## 2024-03-22 RX ORDER — TRAMADOL HYDROCHLORIDE AND ACETAMINOPHEN 37.5; 325 MG/1; MG/1
1 TABLET ORAL
Refills: 0 | DISCHARGE

## 2024-03-22 RX ORDER — ACETAMINOPHEN 500 MG
650 TABLET ORAL EVERY 6 HOURS
Refills: 0 | Status: DISCONTINUED | OUTPATIENT
Start: 2024-03-22 | End: 2024-03-23

## 2024-03-22 RX ORDER — TRAMADOL HYDROCHLORIDE 50 MG/1
25 TABLET ORAL EVERY 12 HOURS
Refills: 0 | Status: DISCONTINUED | OUTPATIENT
Start: 2024-03-22 | End: 2024-03-23

## 2024-03-22 RX ORDER — ESCITALOPRAM OXALATE 10 MG/1
1 TABLET, FILM COATED ORAL
Qty: 0 | Refills: 0 | DISCHARGE

## 2024-03-22 RX ADMIN — GABAPENTIN 100 MILLIGRAM(S): 400 CAPSULE ORAL at 11:06

## 2024-03-22 RX ADMIN — POLYETHYLENE GLYCOL 3350 17 GRAM(S): 17 POWDER, FOR SOLUTION ORAL at 05:52

## 2024-03-22 RX ADMIN — TRAMADOL HYDROCHLORIDE 25 MILLIGRAM(S): 50 TABLET ORAL at 22:00

## 2024-03-22 RX ADMIN — SENNA PLUS 2 TABLET(S): 8.6 TABLET ORAL at 21:04

## 2024-03-22 RX ADMIN — TRAMADOL HYDROCHLORIDE 25 MILLIGRAM(S): 50 TABLET ORAL at 21:04

## 2024-03-22 RX ADMIN — Medication 650 MILLIGRAM(S): at 05:52

## 2024-03-22 RX ADMIN — POLYETHYLENE GLYCOL 3350 17 GRAM(S): 17 POWDER, FOR SOLUTION ORAL at 18:01

## 2024-03-22 RX ADMIN — Medication 650 MILLIGRAM(S): at 12:06

## 2024-03-22 RX ADMIN — PANTOPRAZOLE SODIUM 40 MILLIGRAM(S): 20 TABLET, DELAYED RELEASE ORAL at 18:01

## 2024-03-22 RX ADMIN — ESCITALOPRAM OXALATE 5 MILLIGRAM(S): 10 TABLET, FILM COATED ORAL at 11:06

## 2024-03-22 RX ADMIN — PANTOPRAZOLE SODIUM 40 MILLIGRAM(S): 20 TABLET, DELAYED RELEASE ORAL at 06:01

## 2024-03-22 RX ADMIN — Medication 650 MILLIGRAM(S): at 11:06

## 2024-03-22 NOTE — DISCHARGE NOTE PROVIDER - HOSPITAL COURSE
#Discharge: do not delete    Patient is 89 yo F with past medical history of Breast cancer s/p R mastectomy, urinary retention, thoracolumbar compression fractures, chronic anemia, mild dementia/cognitive impairment, recent admission for anemia requiring transfusion and spinal compression fractures BIBEMS for hypotension at home.   Presented with hypotension, altered mental status, confusion, constipation and back pain. Pt found to have hgb 2.9 and hct 9.4.    Problem List/Main Diagnoses (system-based):   Problem: Constipation.   ·  Plan: CTAP done on 3/21 significant for constipation/fecal stasis. No evidence of mechanical bowel obstruction or GI hemorrhage.    Patient has previously denied EGD/C-scope with GI for further evaluation; GI notified to follow up outpatient (last known c-scope was 15 years ago, normal findings at the time). Pt had denied having a bowel movement in the last 5 days, however per patient's nurse she had a bowel movement this morning (3/22)  -Continue with Miralax daily  (anticipating passing trial of void)       Problem/Plan - 2:  ·  Problem: Anemia.   ·  Plan: Most recent Hemoglobin: 8, HCT: 23.2, RBC: 2.54  Patient is s/p 4 RBCs transfusions.   Plan for pt to follow up with her PCP for her hemoglobin levels and need for blood transfusions.      Problem/Plan - 3:  ·  Problem: Altered mental status; Resolved  ·  Plan: On admission, patient had altered mental status/confusion; likely secondary to hypotension and severe anemia- now resolved.   Currently AAO X3 (to person, place, time). At home, patient has home health aid.       Problem/Plan - 4:  ·  Problem: Back pain due to thoracolumbar compression fractures.   ·  Plan:   -Patient follows with Dr. Stevens (ortho) outpatient. Pt was referred to Dr. Morley for pain management and received injections, with minimal relief.   -PT will see patient and ensure they are able to walk with her before she can go home. Plan for PT to continue to see patient at home as well  -Patient lives with home health aid  -Pain control with Tylenol 650 mg  -PT will follow up outpatient with ortho and pain management as discussed previously.     Inpatient treatment course: Patient received total of 4 units packed RBCs; mental status improved AAOX3. Hemoglobin has been uptrending (currently at 8). Patient has now successfully had one bowel movement and is urinating regularly. No blood in stool or urine.   New medications: none   Labs to be followed outpatient: Patient refused colonoscopy/further GI workup. Plan for pt to follow up with her PCP for her hemoglobin levels and need for blood transfusions.   Exam to be followed outpatient:    Patient is 89 yo F with past medical history of Breast cancer s/p R mastectomy, urinary retention, thoracolumbar compression fractures, chronic anemia, mild dementia/cognitive impairment, recent admission for anemia requiring transfusion and spinal compression fractures BIBEMS for hypotension at home. Presented with hypotension, altered mental status, confusion, constipation and back pain. Pt found to have hgb 2.9 and hct 9.4. Admitted for further management.     Hospital course (by problem list):     #Anemia.   Most recent Hemoglobin: 8, HCT: 23.2, RBC: 2.54  Patient is s/p 4 RBCs transfusions.   Plan for pt to follow up with her PCP for her hemoglobin levels and need for blood transfusions.     #Constipation.   CTAP done on 3/21 significant for constipation/fecal stasis. No evidence of mechanical bowel obstruction or GI hemorrhage.    Patient has previously denied EGD/C-scope with GI for further evaluation; GI notified to follow up outpatient (last known c-scope was 15 years ago, normal findings at the time). Pt had denied having a bowel movement in the last 5 days, however per patient's nurse she had a bowel movement this morning (3/22)  -Continue with Miralax daily (anticipating passing trial of void)    #Altered mental status; Resolved  On admission, patient had altered mental status/confusion; likely secondary to hypotension and severe anemia- now resolved.   Currently AAO X3 (to person, place, time). At home, patient has home health aid.    #Back pain due to thoracolumbar compression fractures.    -Patient follows with Dr. Stevens (ortho) outpatient. Pt was referred to Dr. Morley for pain management and received injections, with minimal relief.   -PT will see patient and ensure they are able to walk with her before she can go home. Plan for PT to continue to see patient at home as well  -Patient lives with home health aid  -Pain control with Tylenol 650 mg  -PT will follow up outpatient with PCP and pain management as discussed previously.     Inpatient treatment course: Patient received total of 4 units packed RBCs; mental status improved AAOX3. Hemoglobin has been uptrending (currently at 8). Patient has now successfully had one bowel movement and is urinating regularly. No blood in stool or urine.     New medications: none     Labs to be followed outpatient: Patient refused colonoscopy/further GI workup. Plan for pt to follow up with her PCP for her hemoglobin levels and need for blood transfusions.   Exam to be followed outpatient:    Patient is 89 yo F with past medical history of Breast cancer s/p R mastectomy, urinary retention, thoracolumbar compression fractures, chronic anemia, mild dementia/cognitive impairment, recent admission for anemia requiring transfusion and spinal compression fractures BIBEMS for hypotension at home. Presented with hypotension, altered mental status, confusion, constipation and back pain. Pt found to have hgb 2.9 and hct 9.4. Admitted for further management.     Hospital course (by problem list):     #Anemia.   Most recent Hemoglobin: 8, HCT: 23.2, RBC: 2.54  Patient is s/p 4 RBCs transfusions.   Plan for pt to follow up with her PCP for her hemoglobin levels and need for blood transfusions.     #Constipation.   CTAP done on 3/21 significant for constipation/fecal stasis. No evidence of mechanical bowel obstruction or GI hemorrhage.    Patient has previously denied EGD/C-scope with GI for further evaluation; GI notified to follow up outpatient (last known c-scope was 15 years ago, normal findings at the time). Pt had denied having a bowel movement in the last 5 days, however per patient's nurse she had a bowel movement this morning (3/22)  -Continue with Miralax daily (anticipating passing trial of void)    #Altered mental status; Resolved  On admission, patient had altered mental status/confusion; likely secondary to hypotension and severe anemia- now resolved.   Currently AAO X3 (to person, place, time). At home, patient has home health aid.    #Back pain due to thoracolumbar compression fractures.    -Patient follows with Dr. Stevens (ortho) outpatient. Pt was referred to Dr. Morley for pain management and received injections, with minimal relief.   -PT will see patient and ensure they are able to walk with her before she can go home. Plan for PT to continue to see patient at home as well  -Patient lives with home health aid  -Pain control with Tylenol 650 mg  -PT will follow up outpatient with PCP and pain management as discussed previously.     #Urinary retention.   History of urinary retention, likely exacerbated by constipation. Briseno placed in the ED 3/21.   - Continue with bowel regimen as stated above  - Briseno removed. TOV on 3/22_______    Inpatient treatment course: Patient received total of 4 units packed RBCs; mental status improved AAOX3. Hemoglobin has been uptrending (currently at 8). Patient has now successfully had one bowel movement and is urinating regularly. No blood in stool or urine.     New medications: none     Labs to be followed outpatient: Patient refused colonoscopy/further GI workup. Plan for pt to follow up with her PCP for her hemoglobin levels and need for blood transfusions.   Exam to be followed outpatient:    Patient is 89 yo F with past medical history of Breast cancer s/p R mastectomy, urinary retention, thoracolumbar compression fractures, chronic anemia, mild dementia/cognitive impairment, recent admission for anemia requiring transfusion and spinal compression fractures BIBEMS for hypotension at home. Presented with hypotension, altered mental status, confusion, constipation and back pain. Pt found to have hgb 2.9 and hct 9.4. Admitted for further management.     Hospital course (by problem list):     #Anemia.   Most recent Hemoglobin: 8, HCT: 23.2, RBC: 2.54  Patient is s/p 4 RBCs transfusions.   Plan for pt to follow up with her PCP for her hemoglobin levels and need for blood transfusions.     #Constipation.   CTAP done on 3/21 significant for constipation/fecal stasis. No evidence of mechanical bowel obstruction or GI hemorrhage.    Patient has previously denied EGD/C-scope with GI for further evaluation; GI notified to follow up outpatient (last known c-scope was 15 years ago, normal findings at the time). Pt had denied having a bowel movement in the last 5 days, however per patient's nurse she had a bowel movement this morning (3/22)  -Continue with Miralax daily (anticipating passing trial of void).     #Altered mental status; Resolved  On admission, patient had altered mental status/confusion; likely secondary to hypotension and severe anemia- now resolved.   Currently AAO X3 (to person, place, time). At home, patient has home health aid.    #Back pain due to thoracolumbar compression fractures.    -Patient follows with Dr. Stevens (ortho) outpatient. Pt was referred to Dr. Morley for pain management and received injections, with minimal relief.   -PT will see patient and ensure they are able to walk with her before she can go home. Plan for PT to continue to see patient at home as well  -Patient lives with home health aid  -Pain control with Tylenol 650 mg  -PT will follow up outpatient with PCP and pain management as discussed previously.     #Urinary retention.   History of urinary retention, likely exacerbated by constipation. Briseno placed in the ED 3/21.   - Continue with bowel regimen as stated above  - Briseno removed. Passed TOV.     Inpatient treatment course: Patient received total of 4 units packed RBCs; mental status improved AAOX3. Hemoglobin has been uptrending (currently at 8). Patient has now successfully had one bowel movement and is urinating regularly. No blood in stool or urine.     New medications: none     Labs to be followed outpatient: Patient refused colonoscopy/further GI workup. Plan for pt to follow up with her PCP for her hemoglobin levels and need for blood transfusions.   Exam to be followed outpatient:

## 2024-03-22 NOTE — CONSULT NOTE ADULT - SUBJECTIVE AND OBJECTIVE BOX
Patient is a 89y old  Female who presents with a chief complaint of Anemia (21 Mar 2024 17:23)      HPI:   89F PMH Breast Cx s/p R mastectomy, urinary retention, thoracolumbar compression fractures, chronic anemia, mild dementia/cognitive impairment, recent admission for anemia requiring transfusion and spinal compression fractures BIBEMS for hypotension at home. Patient herself is poor historian however per ED documentation reportedly has been having 2 weeks of declining mental status, confusion, fatigue, constipation, and back pain. Was seen day of admission by [facility] medicine provider and found to be hypotensive, tachycardic (new A-fib), and hemoccult positive on rectal exam. She was given 1 g of ceftriaxone and 1L IVF at home and transported to ED. Patient herself currently endorsing back pain, abdominal pain, mild nausea and 2 episodes of vomiting 4-5 days ago. LBM 4-5 days ago. Denies any bleeding from anywhere, no blood in urine/stool/vomit, no dark stools. Denies chest pain, SOB, or fevers at home. Reports she lives alone at home.     ED Course:   Vitals: T 98.7  /52 RR 16 saturating 98% on RA --> 98% on 2L NC  Labs: WBC 13.69 hgb 2.9 Hct 9.4  Trop 81 Lactate 30 BUN 52   Imaging:   ·	CT Angio A/P: No evidence of GI hemorrhage on this study. Constipation.  ·	CT Head: No hydrocephalus, midline shift, acute intracranial hemorrhage or demarcated territorial infarct.  Interventions: Pantoprazole 80 mg IV and PPI gtt, 2L LR + 500 mL LR, 2u pRB   (21 Mar 2024 05:30)    PAST MEDICAL & SURGICAL HISTORY:  Breast CA      Multiple falls      Back pain      Urinary retention      Osteoporosis      Macrocytosis      No significant past surgical history        MEDICATIONS  (STANDING):  acetaminophen     Tablet .. 650 milliGRAM(s) Oral every 6 hours  escitalopram 5 milliGRAM(s) Oral daily  gabapentin 100 milliGRAM(s) Oral every 24 hours  pantoprazole  Injectable 40 milliGRAM(s) IV Push every 12 hours  polyethylene glycol 3350 17 Gram(s) Oral two times a day  senna 2 Tablet(s) Oral at bedtime    MEDICATIONS  (PRN):  Biotene Dry Mouth Oral Rinse 5 milliLiter(s) Swish and Spit three times a day PRN Mouth Care  traMADol 25 milliGRAM(s) Oral every 12 hours PRN Moderate Pain (4 - 6)        FAMILY HISTORY:  No pertinent family history in first degree relatives        CBC Full  -  ( 22 Mar 2024 06:58 )  WBC Count : 9.29 K/uL  RBC Count : 2.54 M/uL  Hemoglobin : 8.0 g/dL  Hematocrit : 23.2 %  Platelet Count - Automated : 225 K/uL  Mean Cell Volume : 91.3 fl  Mean Cell Hemoglobin : 31.5 pg  Mean Cell Hemoglobin Concentration : 34.5 gm/dL  Auto Neutrophil # : 6.76 K/uL  Auto Lymphocyte # : 1.12 K/uL  Auto Monocyte # : 0.73 K/uL  Auto Eosinophil # : 0.53 K/uL  Auto Basophil # : 0.03 K/uL  Auto Neutrophil % : 72.7 %  Auto Lymphocyte % : 12.1 %  Auto Monocyte % : 7.9 %  Auto Eosinophil % : 5.7 %  Auto Basophil % : 0.3 %      03-22    140  |  107  |  37<H>  ----------------------------<  98  3.8   |  24  |  1.00    Ca    8.0<L>      22 Mar 2024 06:58  Phos  2.9     03-22  Mg     2.4     03-22    TPro  5.0<L>  /  Alb  2.7<L>  /  TBili  1.6<H>  /  DBili  x   /  AST  26  /  ALT  14  /  AlkPhos  66  03-21      Urinalysis Basic - ( 22 Mar 2024 06:58 )    Color: x / Appearance: x / SG: x / pH: x  Gluc: 98 mg/dL / Ketone: x  / Bili: x / Urobili: x   Blood: x / Protein: x / Nitrite: x   Leuk Esterase: x / RBC: x / WBC x   Sq Epi: x / Non Sq Epi: x / Bacteria: x        Radiology :     < from: Xray Chest 1 View-PORTABLE IMMEDIATE (03.20.24 @ 23:12) >  ACC: [id number] EXAM:  XR CHEST PORTABLE IMMED 1V   ORDERED BY: DAYNE RUGGIERO     PROCEDURE DATE:  03/20/2024          INTERPRETATION:  Portable chest    HISTORY: Sepsis    IMPRESSION:    Lungs clear. No infiltrate pleural effusion or pneumothorax. Unremarkable   cardiac silhouette. No acute bone abnormality.      < from: CT Head No Cont (03.21.24 @ 03:00) >  ACC: [id number] EXAM:  CT BRAIN   ORDERED BY: DAYNE CERNABY     PROCEDURE DATE:  03/21/2024          INTERPRETATION:  CT OF THE HEAD WITHOUT CONTRAST    INDICATION:  AMS    TECHNIQUE: An axial noncontrast CT scan of the head was performed.   Sagittal and coronal reformatted images were also obtained.    CONTRAST:  None    COMPARISON:  4/19/2022    FINDINGS:  There is ventricular and sulcal prominence consistent with generalized   age related cerebral volume loss. There is no hydrocephalus. The basal   cisterns are patent. There is no focal mass effect or midline shift.   There are no extra-axial collections or intraparenchymal hemorrhage.    There are scattered regions of hypodensity involving the periventricular   and subcortical white matter consistent with chronic microvascular   ischemic changes. There is no evidence of acute transcortical territorial   infarction.    The globes and retrobulbar fat are intact.    The mastoids and paranasal sinuses are well aerated. Small osteoma   emanating off the inner table of the right lateral frontal calvarium.    IMPRESSION:  No hydrocephalus, midline shift, acute intracranial hemorrhage or   demarcated territorial infarct.      < from: CT Angio Abdomen and Pelvis w/ IV Cont (03.21.24 @ 03:16) >  ACC: [id number] EXAM:  CT ANGIO ABD PELV (W)AW IC   ORDERED BY: DAYNE CERNABY     PROCEDURE DATE:  03/21/2024          INTERPRETATION:  CLINICAL INFORMATION: GI bleed    COMPARISON: 3/4/2024.    CONTRAST/COMPLICATIONS:  IV Contrast: Isovue 370  95 cc administered   5 cc discarded  Oral Contrast: NONE  Complications: None reported at time of study completion    Findings:  Please note that evaluation for GI bleed requires precontrast, arterial   and delayed phase sequences. This study only includes arterial phase   imaging significantly limiting evaluation for active GI bleeding.    Smooth peripheral contour liver. Distended gallbladder without radiopaque   gallstones. No pericholecystic fluid. Spleen within normal limits. No   adrenal lesion.    No hydronephrosis or obstructing renal/ureteral calculus. A few renal   cysts measuring up to 14 mm on the right and 10 mm on the left.    No peripancreatic inflammatory changes.    Aorta and aortic branches are patent.    Decompressed urinary bladder containing a Briseno catheter.    The uterus is within normal limits.    Stool throughout the large bowel consistent with constipation/fecal   stasis. No intraluminal contrast extravasation to suggest active GI   hemorrhage at the time of this study. No evidence of mechanical bowel   obstruction. No pericolic inflammatory change, wall thickening,   pneumoperitoneum or ascites. Appendix is not visualized.    Small hiatal hernia.    No bulky retroperitoneal lymphadenopathy.    Multilevel compression fractures unchanged from the prior study of   3/4/2024 extending from T12 through L4.    IMPRESSION:  No evidence of GI hemorrhage on this study.    Fecal stasis/constipation.        Review of Systems : per HPI         Vital Signs Last 24 Hrs  T(C): 36.4 (22 Mar 2024 06:04), Max: 36.9 (21 Mar 2024 14:17)  T(F): 97.5 (22 Mar 2024 06:04), Max: 98.4 (21 Mar 2024 14:17)  HR: 76 (22 Mar 2024 06:04) (76 - 84)  BP: 149/69 (22 Mar 2024 06:04) (104/63 - 149/69)  BP(mean): 84 (21 Mar 2024 16:00) (84 - 84)  RR: 18 (22 Mar 2024 06:04) (17 - 18)  SpO2: 96% (22 Mar 2024 06:04) (96% - 97%)    Parameters below as of 21 Mar 2024 21:19  Patient On (Oxygen Delivery Method): room air            Physical Exam:   89 y o woman lying comfortably in semi Martin's position , awake ,  NAD     Head: normocephalic , atraumatic    Eyes: PERRLA , EOMI , no nystagmus , sclera anicteric    ENT / FACE: neg nasal discharge , uvula midline , no oropharyngeal erythema / exudate    Neck: supple , negative JVD , negative carotid bruits , no thyromegaly    Chest: CTA bilaterally     Cardiovascular: irregular rate and rhythm      Abdomen: soft , mildly distended , non tender to palpation in all 4 quadrants ,  normal bowel sounds     Extremities: WWP , neg cyanosis /clubbing / edema     Neurologic Exam:     Alert and oriented  x 3        Motor Exam:        > 3+/5 x 4 extremities       Sensation:         intact to light touch x 4 extremities     DTR:           biceps/brachioradialis: equal                            patella/ankle: equal      Gait:  not tested           PM&R Impression: admitted for hypotension at home, found to have hgb 2.9 s/p 2u pRBC     - deconditioned         Recommendations / Plan:       1) Physical / Occupational therapy focusing on therapeutic exercises , equipment evaluation , bed mobility/transfer out of bed evaluation , progressive ambulation with assistive devices prn .    2) Current disposition plan recommendation:       - d/c home with home physical and occupational therapy    - continue 24/7 home care services

## 2024-03-22 NOTE — DISCHARGE NOTE PROVIDER - CARE PROVIDER_API CALL
[facility]  [facility] Medicine  [address] [place] [address], [address]  [place] [address]  Phone: [phone]  Fax: [phone]  Follow Up Time:

## 2024-03-22 NOTE — DIETITIAN INITIAL EVALUATION ADULT - EDUCATION DIETARY MODIFICATIONS
Educated on strategies to promote PO intake in setting of decreased appetite. Discussed kcal/protein dense foods aligned with preferences. Encouraged use of oral nutrition supplements between meals to maximize intake. Pt aware RD remains available for additional questions/concerns./(2) meets goals/outcomes/verbalization

## 2024-03-22 NOTE — PROGRESS NOTE ADULT - PROBLEM SELECTOR PLAN 3
On admission, patient had altered mental status/confusion. Post first 2 upRBCs, patients mental status improved. Currently AAO X3 (to person, place, time). At home, patient has home health aid.  -Continue to monitor patient's mental status On admission, patient had altered mental status/confusion. Post first 2 upRBCs, patients mental status improved. Currently AAO X3 (to person, place, time). At home, patient has home health aid.  -AMS resolved History of urinary retention, likely exacerbated by constipation. Briseno placed in the ED 3/21.     Plan:  - Continue with bowel regimen as stated above.  -Briseno removed. TOV today.

## 2024-03-22 NOTE — DIETITIAN INITIAL EVALUATION ADULT - OTHER INFO
89F PMH breast cancer status post right mastectomy, urinary retention, thoracolumbar compression fractures, chronic anemia, mild dementia/cognitive impairment, recent admission for anemia requiring transfusion and spinal compression fractures BIBEMS for hypotension at home, found to have HgB 2.9 s/p 4u pRBCs with improved hb now stable for stepdown for ongoing management.    Pt seen on 4UR for assessment. Labs and medication orders reviewed.  89F PMH breast cancer status post right mastectomy, urinary retention, thoracolumbar compression fractures, chronic anemia, mild dementia/cognitive impairment, recent admission for anemia requiring transfusion and spinal compression fractures BIBEMS for hypotension at home, found to have HgB 2.9 s/p 4u pRBCs with improved hb now stable for stepdown for ongoing management.    Pt seen on 4UR for assessment. Labs and medication orders reviewed. BUN/Cr 37 <H>/1 WNL, electrolytes WNL. On soft and bite sized diet status post SLP eval (3/21). Pt reports poor appetite and intake x1 week PTA in setting of back pain and postprandial nausea. Reports UBW ~120lb, RD obtained bed scale wt 128lb suggests wt gain. Some muscle depletions noted on physical exam likely in setting of advanced age, no overt signs of nutrition related wasting at this time. Pt denies difficulty chewing/swallowing on current diet. Endorses intermittent nausea without vomiting after meals and medications. Reports chronic constipation, utilizes miralax + senna at home, last recorded BM 3/21 - bowel regimen ordered. Pt confirms no known food allergies. No Christianity/ethnic/cultural food preferences noted. No pressure injuries noted, 2+ bilateral ankle edema documented, Marcello score 14. Recommended oral nutrition supplement, pt receptive to Ensure x2/day - RD communicated to team. See nutrition recommendations. RD to remain available.

## 2024-03-22 NOTE — DISCHARGE NOTE PROVIDER - NSDCMRMEDTOKEN_GEN_ALL_CORE_FT
acetaminophen 325 mg oral tablet: 2 tab(s) orally every 6 hours, As Needed for pain   cholecalciferol oral tablet: 400 unit(s) orally once a day  escitalopram 5 mg oral tablet: 1 tab(s) orally once a day  lidocaine 4% topical film: Apply topically to affected area once a day  melatonin 3 mg oral tablet: 1 tab(s) orally once a day (at bedtime) As needed Insomnia  polyethylene glycol 3350 oral powder for reconstitution: 17 gram(s) orally every 12 hours  senna leaf extract oral tablet: 2 tab(s) orally once a day (at bedtime)   cholecalciferol oral tablet: 400 unit(s) orally once a day  melatonin 3 mg oral tablet: 1 tab(s) orally once a day (at bedtime) As needed Insomnia  polyethylene glycol 3350 oral powder for reconstitution: 17 gram(s) orally every 12 hours  senna leaf extract oral tablet: 2 tab(s) orally once a day (at bedtime)  tramadol-acetaminophen 37.5 mg-325 mg oral tablet: 1 tab(s) orally 2 times a day

## 2024-03-22 NOTE — PROGRESS NOTE ADULT - PROBLEM SELECTOR PLAN 1
CTAP done on 3/21 significant for constipation/fecal stasis. No evidence of mechanical bowel obstruction or GI hemorrhage.   Patient still has not had a bowel movement and on PE there is abdominal distension present. Patient has previously denied EGD/C-scope with GI for further evaluation; GI notified to follow up outpatient (last known c-scope was 15 years ago, normal findings at the time)  -Continue with Miralax TID  -Continue with Senna 2 tabs before bed  -Dulcolax enema for today - if hemodynamically stable, patient can be d/c after having a bowel movement CTAP done on 3/21 significant for constipation/fecal stasis. No evidence of mechanical bowel obstruction or GI hemorrhage.   Patient still has not had a bowel movement and on PE there is abdominal distension present. Patient has previously denied EGD/C-scope with GI for further evaluation; GI notified to follow up outpatient (last known c-scope was 15 years ago, normal findings at the time)  -Continue with Miralax once daily   -Trial of void today. If patient passes and hemodynamically stable, patient can be d/c Most recent Hemoglobin: 8, HCT: 23.2, RBC: 2.54  Patient is s/p 4 RBCs transfusions.   Transfuse for patient if hemoglobin <7, therefore pt does not need any transfusions at this time.  -continue to monitor patient's labs: f/u iron studies, fibrinogen, LDH, reticulocyte count, haptoglobin, B12, folate  -Upon discharge, plan for patient to follow up with PCP outpatient to monitor hemoglobin and need for transfusions. Presents w/ HgB 2.9 and HCT 19 on admission, s/p 2u pRBC with appropriate response. On admission, lactate 3.0 s/p 2.5L of fluid (1L at home and 1.5 LR in ED). Repeat lactate 2.0. No obvious s/s of bleeding, with CTAP negative for bleed. Last admission 5/4/2023 for anemia HgB 6.7 and denied EGD/C-scope with GI for further evaluation. GI notified to follow up outpatient. Last C-scope was 15 years ago, normal findings with no h/o endoscopy.   -GOC Discussion: Patient stated she would not want to undergo EGD; most important goal is to be at home and for pain control  -, B9/12 WNL. Reticulocyte count WNL, % increased (appropriate response)  Most recent Hemoglobin: 8, HCT: 23.2, RBC: 2.54  Patient is s/p 4 RBCs transfusions.   - c/w 40 mg IV pantoprazole BID   - Maintain active T&S, two large bore IVs, transfuse for HgB<7. resents w/ HgB 2.9 and HCT 19 on admission,   No obvious s/s of bleeding, with CTAP negative for bleed. Last admission 5/4/2023 for anemia HgB 6.7 and denied EGD/C-scope with GI for further evaluation. GI notified to follow up outpatient. Last C-scope was 15 years ago, normal findings with no h/o endoscopy.   -GOC Discussion: Patient stated she would not want to undergo EGD; most important goal is to be at home and for pain control  -, B9/12 WNL. Reticulocyte count WNL, % increased (appropriate response)  Most recent Hemoglobin: 8, HCT: 23.2, RBC: 2.54  Patient is s/p 4 RBCs transfusions.

## 2024-03-22 NOTE — DISCHARGE NOTE PROVIDER - NSDCFUSCHEDAPPT_GEN_ALL_CORE_FT
Miguel Morley  [facility]  PAINMGT [facility]  Scheduled Appointment: 04/02/2024

## 2024-03-22 NOTE — PHYSICAL THERAPY INITIAL EVALUATION ADULT - DID THE PATIENT HAVE SURGERY?
[facility] Medicine Services  PROGRESS NOTE    Patient Name: Susan Anderson  : 1939  MRN: [id number]    Date of Admission: 2022  Primary Care Physician: Arleen Doherty MD    Subjective     CC: ffu weakness, constipation     HPI:  Patient sitting in bed eating her lunch. She states she got a better night of sleep than the previous, and her pain/muscle cramping are improving. She became tearful when speaking about the duration of her hospital stay and missing her dog at home.     ROS:  Gen- No fevers, chills  CV- No chest pain, palpitations  Resp- No cough, dyspnea   GI- No N/V/D, abd pain    Objective     Vital Signs:   Temp:  [97.5 °F (36.4 °C)-98.3 °F (36.8 °C)] 98.2 °F (36.8 °C)  Heart Rate:  [] 76  Resp:  [14-16] 14  BP: (155-172)/(66-83) 170/71  Flow (L/min):  [1-3] 1     Physical Exam:  Constitutional: Chronically-ill woman in no acute distress, sitting upright in bed conversing appropriately  HENT: NCAT, mucous membranes moist   Respiratory: Fine crackles appreciated in bilateral lower lobes. Desaturated to 90% on room air. Sats improved to 94% on 0.5L. Normal respiratory effort   Cardiovascular: RRR, no murmurs, rubs, or gallops  Gastrointestinal: Positive bowel sounds, firm, nontender, mildly distended  Musculoskeletal: 1-2+ pitting BLE edema, 1+ pitting BUE edema with R > L, right knee pain stable since last exam on   Psychiatric: Cooperative, speech clear and spontaneous   Neurologic: Oriented x 3, strength symmetric in all extremities, generalized weakness, Cranial Nerves grossly intact with no focal deficits  Skin: No rashes, diffuse ecchymosis bilateral arms    Results Reviewed:  LAB RESULTS:      Lab 22  0933 22  0752   WBC 5.89 6.16   HEMOGLOBIN 9.2* 9.3*   HEMATOCRIT 29.0* 28.8*   PLATELETS 220 200   NEUTROS ABS 4.05  --    IMMATURE GRANS (ABS) 0.08*  --    LYMPHS ABS 0.86  --    MONOS ABS 0.78  --    EOS ABS 0.09  --    MCV 92.4 92.6          Lab 08/10/22  0641 08/07/22  0933 08/06/22  1418 08/05/22  0752 08/04/22  0812   SODIUM 144 141 143 144 141   POTASSIUM 4.2 4.1 4.5 4.7 4.2   CHLORIDE 102 100 102 102 101   CO2 37.0* 32.0* 29.0 31.0* 32.0*   ANION GAP 5.0 9.0 12.0 11.0 8.0   BUN 51* 60* 62* 65* 62*   CREATININE 1.05* 1.15* 1.24* 1.51* 1.39*   EGFR 52.8* 47.4* 43.3* 34.2* 37.7*   GLUCOSE 119* 171* 197* 106* 168*   CALCIUM 8.4* 9.0 9.2 8.9 8.8     Brief Urine Lab Results  (Last result in the past 365 days)      Color   Clarity   Blood   Leuk Est   Nitrite   Protein   CREAT   Urine HCG        07/31/22 1503             27.0             Microbiology Results Abnormal     Procedure Component Value - Date/Time    Blood Culture - Blood, Arm, Right [[id number]]  (Normal) Collected: 07/29/22 1456    Lab Status: Final result Specimen: Blood from Arm, Right Updated: 08/03/22 1517     Blood Culture No growth at 5 days    Blood Culture - Blood, Hand, Left [[id number]]  (Normal) Collected: 07/29/22 1456    Lab Status: Final result Specimen: Blood from Hand, Left Updated: 08/03/22 1517     Blood Culture No growth at 5 days    Body Fluid Culture - Body Fluid, Knee, Right [[id number]] Collected: 07/29/22 1400    Lab Status: Final result Specimen: Body Fluid from Knee, Right Updated: 08/03/22 1009     Body Fluid Culture No growth at 5 days     Gram Stain Rare (1+) WBCs seen      No organisms seen    Eosinophil Smear - Urine, Urine, Clean Catch [[id number]]  (Normal) Collected: 07/31/22 1503    Lab Status: Final result Specimen: Urine, Clean Catch Updated: 07/31/22 1627     Eosinophil Smear 0 % EOS/100 Cells     Narrative:      No eosinophil seen    COVID PRE-OP / PRE-PROCEDURE SCREENING ORDER (NO ISOLATION) - Swab, Nasopharynx [[id number]]  (Normal) Collected: 07/26/22 2300    Lab Status: Final result Specimen: Swab from Nasopharynx Updated: 07/26/22 0818    Narrative:      The following orders were created for panel order COVID PRE-OP / PRE-PROCEDURE SCREENING  ORDER (NO ISOLATION) - Swab, Nasopharynx.  Procedure                               Abnormality         Status                     ---------                               -----------         ------                     COVID-19 and FLU A/B PCR...[[id number]]  Normal              Final result                 Please view results for these tests on the individual orders.    COVID-19 and FLU A/B PCR - Swab, Nasopharynx [[id number]]  (Normal) Collected: 07/26/22 2300    Lab Status: Final result Specimen: Swab from Nasopharynx Updated: 07/26/22 2340     COVID19 Not Detected     Influenza A PCR Not Detected     Influenza B PCR Not Detected    Narrative:      Fact sheet for providers: https://www.fda.gov/media/869518/download    Fact sheet for patients: https://www.fda.gov/media/001355/download    Test performed by PCR.        No radiology results from the last 24 hrs    Results for orders placed during the hospital encounter of 07/26/22    Adult Transthoracic Echo Complete W/ Cont if Necessary Per Protocol    Interpretation Summary  · Normal left ventricular systolic function, estimated EF 55%.  · Aortic sclerosis without aortic stenosis or regurgitation.  · Mild mitral regurgitation.    I have reviewed the medications:  Scheduled Meds:allopurinol, 300 mg, Oral, Daily  amLODIPine, 5 mg, Oral, Daily  amoxicillin-clavulanate, 1 tablet, Oral, Daily  aspirin, 81 mg, Oral, Daily  atorvastatin, 80 mg, Oral, Nightly  bumetanide, 2 mg, Oral, BID  castor oil-balsam peru, 1 application, Topical, Q12H  Diclofenac Sodium, 2 g, Topical, 4x Daily  ferrous sulfate, 325 mg, Oral, TID With Meals  guaiFENesin, 1,200 mg, Oral, Q12H  heparin (porcine), 5,000 Units, Subcutaneous, Q12H  hydrALAZINE, 25 mg, Oral, Q8H  insulin detemir, 25 Units, Subcutaneous, Daily  insulin lispro, 0-7 Units, Subcutaneous, TID AC  Insulin Lispro, 8 Units, Subcutaneous, TID With Meals  isosorbide dinitrate, 20 mg, Oral, BID  lidocaine, 1 patch, Transdermal,  Q24H  polyethylene glycol, 17 g, Oral, BID  pregabalin, 50 mg, Oral, Q12H  senna-docusate sodium, 1 tablet, Oral, BID  sodium chloride, 10 mL, Intravenous, Q12H  vitamin B-12, 100 mcg, Oral, Daily      Continuous Infusions:   PRN Meds:.•  acetaminophen **OR** [DISCONTINUED] acetaminophen **OR** [DISCONTINUED] acetaminophen  •  bisacodyl  •  bisacodyl  •  cyclobenzaprine  •  dextrose  •  dextrose  •  diazePAM  •  glucagon (human recombinant)  •  ipratropium-albuterol  •  magnesium hydroxide  •  ondansetron **OR** ondansetron  •  Polyvinyl Alcohol-Povidone PF  •  [COMPLETED] Insert peripheral IV **AND** sodium chloride  •  sodium chloride  •  sodium chloride    Assessment & Plan     Active Hospital Problems    Diagnosis  POA   • **Weakness [R53.1]  Yes   • Anemia [D64.9]  Unknown   • Fall [W19.XXXA]  Unknown   • Dizziness [R42]  Unknown   • Acoustic neuroma (HCC) [D33.3]  Unknown   • Elevated troponin [R77.8]  Unknown   • CHF exacerbation (HCC) [I50.9]  Unknown   • Elevated serum creatinine [R79.89]  Unknown   • NICM (nonischemic cardiomyopathy) ([facility]) [I42.8]  Yes   • Coronary artery disease involving native coronary artery of native heart without angina pectoris [I25.10]  Yes   • Dyslipidemia [E78.5]  Yes   • Chronic combined systolic and diastolic heart failure (HCC) [I50.42]  Yes   • Mitral valve disease [I05.9]  Yes   • PVD (peripheral vascular disease) ([facility]) [I73.9]  Yes   • Essential hypertension [I10]  Yes   • CKD (chronic kidney disease), stage III ([facility]) [N18.30]  Yes      Resolved Hospital Problems   No resolved problems to display.     Brief Hospital Course to date:  Susan Anderson is a 83 y.o. female with PMH significant for HTN, HLD, CAD, non-ischemic cardiomyopathy, chronic combined systolic/diastolic CHF, CKD III, PVD, insulin-dependent DMII, ELIUD (on 2L NC QHS, chronic BLE edema, and acoustic neuroma. She was admitted to [facility] 7/26/22 for mild rhabdomyolysis after a fall at home. Also  found to have a/c CHF exacerbation and LACEY.      Fall at home  Generalized weakness  - Fall at home in bathroom when she opted not to wait for her bath aide to help her  - Cardiology has evaluated and feel her falls are likely mechanical and not cardiac. May have some orthostasis. Patient does report she feels better with SBP in 150's, so allowing some permissive hypertension   - PT/OT following - case management working on rehab placement     Rhabdomyolysis, resolved   -  on admission, s/p IV fluids      Acute on chronic combined systolic/diastolic CHF  Nonischemic cardiomyopathy   LACEY on CKD III - baseline 1.3-1.5  - Appreciate nephrology assistance with diuresis   - On Bumex 1mg daily 7/28-8/3, then increased to 1mg BID 8/4-8/6, then increased to 2mg BID 8/6-8/8  - Worsening edema after Bumex decreased to 1mg BID on 8/9 - discussed with nephrology and will increase back to 2mg BID   - 8/1 renal artery duplex suggestive of intrinsic disease but no significant renal artery stenosis or obstructive uropathy   - Creatinine 1.9 on admission, has since returned to baseline  - CXR 8/3 showed congestive heart failure with moderate bibasilar effusion/atelectasis  - s/p IV albumin   - Fluid restriction per nephrology, strict I&O   - OK for discharge from Nephrology standpoint, per note. Nephrology has signed off      Blood culture (+) MRSA, suspected contaminant   - 7/26 - 2 of 2 blood cultures (+) staph epidermis, 1 of 2 positive for MRSA  - Repeat blood cultures on 7/31 NGTD  - Appreciate ID assistance. No source of infection has been identified. Suspect contaminant. IV antibiotics stopped     Right knee effusion   - Orthopedic surgery has evaluated  - Joint aspiration not consistent with infection. Culture NGTD  - MRI of R knee does not suggest structural injury to the knee  - WBAT. Knee immobilizer on for comfort  - Follow up with Dr. Pearce in 2-3 weeks      Low back / leg spasms   - PRN Robaxin ineffective  per patient  - Discontinued Baclofen due to mental status changes  - Continue Flexeril 5mg TID PRN - seems to be tolerating   - Continue Tylenol PRN  - Valium 4mg QHS PRN only      Chronic anemia   - PO iron supplement      Insulin-dependent DMII   Diabetic peripheral neuropathy  - Hgb A1c 9.0%  - Eating better, having issues with hyperglycemia now   - Continue Levemir 25 units daily and increase Lispro to 11 units TID AC + SSI  - Continue Lyrica 50mg BID      History of R great toe osteomyelitis  - s/p TMA 4/2021 by Dr. Finney.   - Culture (+) MSSA  - Has been on chronic suppressive Augmentin for ~1 year  - Back on Augmentin 875mg PO daily per ID      Dizziness  Acoustic neuroma, chronic  - ? contributing to falls  - Had initial w/u for neuroma at [facility]  - Has not had recent follow up imaging, consider MRI with/without contrast if GFR continues to improve prior to DC     Essential hypertension  - Beta blocker stopped due to bradycardia  - Restarted Lipitor now that Daptomycin has been stopped  - Continue Amlodipine 5mg daily, Bumex 2mg BID, Hydralazine 25mg TID, Isordil 20mg BID       Constipation  - Continue Senna, decrease Miralax to every other day  - BM 8/9    Expected Discharge Location and Transportation: [facility] pending acceptance via EMS or medical transport van   Expected Discharge Date: 8/11    DVT prophylaxis:Medical DVT prophylaxis orders are present.     AM-PAC 6 Clicks Score (PT): 14 (08/10/22 0821)    CODE STATUS:   Code Status and Medical Interventions:   Ordered at: 07/27/22 0143     Code Status (Patient has no pulse and is not breathing):    CPR (Attempt to Resuscitate)     Medical Interventions (Patient has pulse or is breathing):    Full Support     Anna Crystal PA-C  08/10/22               n/a

## 2024-03-22 NOTE — PROGRESS NOTE ADULT - ATTENDING COMMENTS
Slow GI Bleeding   Acute blood loss Anemia     PT eval
89 yo woman admitted to the hospital with Hypotension , found to have melena , and upper gi bleed , does not want Procedures     Course was also complicated by urinary retention and Constipation     Had 2 BM on 3/21   TOV on 3/22     if she fails TOV may need to be discharged with Briseno
89 yo F w/ chronic pain 2/2 lumbar compression fractures and prior admission for anemia, refused EGD at that time, returns mostly with uncontrolled back pain but found to have tachycardia and hgb <3. Otherwise hemodynamically stable, admitted to [facility] for telemetry monitoring.    Had extensive discussion w/ pt re: GOC and overall treatment options. She feels given her advanced age she knows she would never want CPR or intubation. She had a harder time determining if she would like to undergo EGD. We discussed what her options are and the knowledge that EGD, while minimally invasive, will be higher risk given her age; however, without EGD it may be hard to definitively treat her bleeding. We tried to determine what her priorities are - she is less concerned with "fixing" a disease process and feels the most important thing to her is ensuring she is comfortable with better control of back pain and the ability to be comfortable at home. She is minimally mobile, totally dependent on home care to get around, poor quality of life. Is independent, though, and would not want to be placed in assisted living. She was open to the idea of home hospice as she would prefer to stay out of hospitals. After this discussion she was quite clear that undergoing EGD would not be ideal and she prefers to avoid procedures. Would like to speak with palliative care further.     DNR/DNI, MOLST filled out.     Now s/p 4 u pRBC, can repeat post transfusion CBC; however, most important is pt comfort. Will try to improve pain management - start low dose gabapentin, standing tylenol, tramadol but with severe constipation so needs bowel regimen.     If she has massive or recurrent hemorrhage, comfort measures would be appropriate.     Transfer to [facility].

## 2024-03-22 NOTE — DIETITIAN INITIAL EVALUATION ADULT - PERTINENT MEDS FT
MEDICATIONS  (STANDING):  acetaminophen     Tablet .. 650 milliGRAM(s) Oral every 6 hours  escitalopram 5 milliGRAM(s) Oral daily  gabapentin 100 milliGRAM(s) Oral every 24 hours  pantoprazole  Injectable 40 milliGRAM(s) IV Push every 12 hours  polyethylene glycol 3350 17 Gram(s) Oral two times a day  senna 2 Tablet(s) Oral at bedtime    MEDICATIONS  (PRN):  Biotene Dry Mouth Oral Rinse 5 milliLiter(s) Swish and Spit three times a day PRN Mouth Care  traMADol 25 milliGRAM(s) Oral every 12 hours PRN Moderate Pain (4 - 6)

## 2024-03-22 NOTE — DISCHARGE NOTE PROVIDER - ATTENDING DISCHARGE PHYSICAL EXAMINATION:
PHYSICAL EXAM:  General: NAD  HEENT: NC/AT; PERRL, anicteric sclera; MMM  Neck: supple w/o palpable nodularity  Cardiovascular: +S1/S2; RRR  Respiratory: CTA B/L; no W/R/R  Gastrointestinal: soft, Distended, mildly tender to palpation in bilateral lower quadrants, +BS   Extremities: WWP; +1 pitting edema   Vascular: 2+ radial, DP/PT pulses B/L  Neurological: AAOx3; no focal deficits

## 2024-03-22 NOTE — PROGRESS NOTE ADULT - PROBLEM SELECTOR PLAN 7
F: S/p 1L fluid at home; S/p1500 LR in ED   E: replete as needed  N: soft bite sized  DVT ppx: Holding iso bleed  Dispo: Home  CODE STATUS: DNR/DNI

## 2024-03-22 NOTE — PROGRESS NOTE ADULT - PROBLEM SELECTOR PLAN 6
On admission, patient had altered mental status/confusion. Post first 2 upRBCs, patients mental status improved. Currently AAO X3 (to person, place, time). At home, patient has home health aid.

## 2024-03-22 NOTE — PHYSICAL THERAPY INITIAL EVALUATION ADULT - ADDITIONAL COMMENTS
Pt lives in an apartment alone, elevator apartment, ambulates with rolling walker and rollator. Pt has 24/7 HHA.

## 2024-03-22 NOTE — PROGRESS NOTE ADULT - PROBLEM SELECTOR PLAN 2
Most recent Hemoglobin: 8, HCT: 23.2, RBC: 2.54  Patient is s/p 4 RBCs transfusions.   Transfuse for patient if hemoglobin <7, therefore pt does not need any transfusions at this time.  -continue to monitor patient's labs: f/u iron studies, fibrinogen, LDH, reticulocyte count, haptoglobin, B12, folate Most recent Hemoglobin: 8, HCT: 23.2, RBC: 2.54  Patient is s/p 4 RBCs transfusions.   Transfuse for patient if hemoglobin <7, therefore pt does not need any transfusions at this time.  -continue to monitor patient's labs: f/u iron studies, fibrinogen, LDH, reticulocyte count, haptoglobin, B12, folate  -Upon discharge, plan for patient to follow up with PCP outpatient to monitor hemoglobin and need for transfusions. CTAP done on 3/21 significant for constipation/fecal stasis. No evidence of mechanical bowel obstruction or GI hemorrhage.   Patient still has not had a bowel movement and on PE there is abdominal distension present. Patient has previously denied EGD/C-scope with GI for further evaluation; GI notified to follow up outpatient (last known c-scope was 15 years ago, normal findings at the time)  -Continue with Miralax once daily   -Trial of void today. If patient passes and hemodynamically stable, patient can be d/c Patient brought to the ED on 3/13 found to be constipated and had a small BM after enema. XR was negative for air fluid levels. Discharged from ED on bowel regimen. On admission 3/20, had not had BM in 6 days. RN reports 1 small BM 3/21 AM.  -CTAP significant for stool burden, no evidence of hemorrhage    Plan:  - Miralax TID, senna 2 tabs before bed   - Dulcolax enema daily.

## 2024-03-22 NOTE — PHYSICAL THERAPY INITIAL EVALUATION ADULT - IMPAIRMENTS FOUND, PT EVAL
aerobic capacity/endurance/ergonomics and body mechanics/fine motor/gait, locomotion, and balance/gross motor/integumentary integrity/joint integrity and mobility/muscle strength/poor safety awareness/posture

## 2024-03-22 NOTE — PROGRESS NOTE ADULT - PROBLEM SELECTOR PLAN 5
F: S/p 1L fluid at home; S/p1500 LR in ED   E: replete as needed  N: soft bite sized  DVT ppx: Holding iso bleed  Dispo: Home  CODE STATUS: DNR/DNI ·  Plan: #RESOLVED  Troponin on admission 81 --> 83 -> 60, likely 2/2 demand ischemia. No chest pain or palpitations on exam. ECG sinus with APCs.    - NTD.

## 2024-03-22 NOTE — DIETITIAN INITIAL EVALUATION ADULT - OTHER CALCULATIONS
Estimated needs based on dosing wt as within % IBW 110lb/50kg (109%). Needs adjusted for age, repletion, and clinical status.

## 2024-03-22 NOTE — DISCHARGE NOTE PROVIDER - NSDCFUADDAPPT_GEN_ALL_CORE_FT
Please be sure to follow up with your primary care physician .  Scheduled for 04/02/2024 at 9:30 am.

## 2024-03-22 NOTE — CONSULT NOTE ADULT - ASSESSMENT
I M    89 y o F PMH Breast Cx s/p R mastectomy, urinary retention, thoracolumbar compression fractures, chronic anemia, mild dementia/cognitive impairment, recent admission for anemia requiring transfusion and spinal compression fractures BIBEMS for hypotension at home, found to have hgb 2.9 s/p 2u pRBC with repeat hgb 5.1, transferred to [facility] for further monitoring.     Problem/Plan - 1:  ·  Problem: SIRS without infection or organ dysfunction.   ·  Plan: On admission meeting 2/4 SIRS criteria with  and WBC 13.69. Received 1L fluid and CTX at home and 1.5 LR on admission. No signs of infection. RVP and UA negative, CXR unremarkable.     - Monitor off of abx   - F/u blood and urine cx.    Problem/Plan - 2:  ·  Problem: Anemia.   ·  Plan: P/w hgb 2.9 and hct 19 on admission, s/p 2u pRBC with appropriate response. No obvious s/s of bleeding, with CTAP negative for bleed.   Last admission 5/4/2024 for anemia (hgb 6.7) and denied EGD/C-scope with GI for further evaluation. GI notified to follow up outpatient. Last C-scope was 15 years ago, normal findings with no hx of endoscopy.     - Order another 2units pRBC   - Consult GI   - Palliative care (GOC was said to be discussed during previous admission, but cannot find prior MOLST or documentation)  - F/u iron studies, fibrinogen, LDH, reticulocyte count, haptoglobin, B12 and folate   - Transition to 40 mg IV pantoprazole BID   - maintain active T&S, two large bore IVs, transfuse for hgb< 7.    Problem/Plan - 3:  ·  Problem: Altered mental status.   ·  Plan: Pt with AMS on admission, unclear baseline etiology. Has a HHA a few hours a day and was last discharged in early March with home PT.     - More collateral from emergency contact and HHA in AM.    Problem/Plan - 4:  ·  Problem: Myocardial infarction type 2.   ·  Plan: Troponin on admission 81 --> 83, likely 2/2 demand ischemia. No chest pain or palpitations on exam.   ECG sinus with APCs    - Trend until cleared.    Problem/Plan - 5:  ·  Problem: Lactic acidosis.   ·  Plan: On admission, lactate 3.0 s/p 2.5L of fluid (1L at home and 1.5 LR in ED). Repeat lactate 2.0.     - Treat as stated above.    Problem/Plan - 6:  ·  Problem: Constipation.   ·  Plan: Patient brought to the ED on 5/13 found to be constipated and had a small BM after enema. XR was negative for air fluid levels. Discharged from ED on bowel regimen.   CTAP significant for stool burden     - C/w Miralax TID   - C/w Senna 2 tabs before bed   - C/w dulcolax enema daily.    Problem/Plan - 7:  ·  Problem: Urinary retention.   ·  Plan: History of urinary retention, likely exacerbated by constipation. Briseno placed in the ED 3/21.     - Continue with bowel regimen as stated above.    Problem/Plan - 8:  ·  Problem: Back pain.   ·  Plan: Presenting w/ one week of back pain, follows with Dr. Stevens (ortho) outpatient. Was referred to Dr. Morley for pain management and received injections, with minimal relief.   CT Thoracic and Lumbar Spine 5/4/2024: Since 4/19/2022, increased depression along the superior and inferior endplate of L2 now w/ moderate compression deformity, previously mild compression deformity. Cannot exclude acute on chronic compression deformity. Chronic severe compression deformities again noted at T12 and L1 with   mild to moderate chronic compression deformity at L3 and L4, all unchanged. Multilevel thoracic compression deformities with severe deformity at T3 and T12, moderate deformity at T7 and mild deformity at T9. At T9 there is moderate canal stenosis secondary to disc bulge and mild bony retropulsion.    - Was discharged with home PT, but has not had PT come to her house   - Pain control with Tylenol 650 mg q6hr  - PT consult   - Follow up outpatient with ortho and pain management as discussed previously.    Problem/Plan - 9:  ·  Problem: Prophylactic measure.   ·  Plan: F: S/p 1L fluid at home; S/p1500 LR in ED   E: replete as needed  N: NPO for possible scope by GI   DVT ppx: Holding iso bleed  Dispo: tele   CODE STATUS:

## 2024-03-22 NOTE — DIETITIAN INITIAL EVALUATION ADULT - ADD RECOMMEND
1. Continue liberalized diet, recommend ADD Ensure Plus High Protein (350kcal, 20g protein) x2/day to promote intake. Defer consistency to team/SLP.   >>Encourage & monitor PO intake. [place] dietary preferences as able.   >>Recommend nursing to provide assistance with meals prn.   2. Recommend ADD multivitamin pending no medical contraindications.   3. Monitor GI tolerance, weight trends, labs, & skin integrity.  4. Defer bowel and pain regimens to team.   5. RD to remain available for diet education/intervention prn.

## 2024-03-22 NOTE — PROGRESS NOTE ADULT - SUBJECTIVE AND OBJECTIVE BOX
OVERNIGHT EVENTS:    SUBJECTIVE / INTERVAL HPI: Patient seen and examined at bedside.     VITAL SIGNS:  Vital Signs Last 24 Hrs  T(C): 36.4 (22 Mar 2024 06:04), Max: 36.9 (21 Mar 2024 14:17)  T(F): 97.5 (22 Mar 2024 06:04), Max: 98.4 (21 Mar 2024 14:17)  HR: 76 (22 Mar 2024 06:04) (76 - 84)  BP: 149/69 (22 Mar 2024 06:04) (104/63 - 149/69)  BP(mean): 84 (21 Mar 2024 16:00) (84 - 84)  RR: 18 (22 Mar 2024 06:04) (17 - 18)  SpO2: 96% (22 Mar 2024 06:04) (96% - 97%)    Parameters below as of 21 Mar 2024 21:19  Patient On (Oxygen Delivery Method): room air        PHYSICAL EXAM:    General: NAD  HEENT: NC/AT; PERRL, anicteric sclera; MMM  Neck: supple w/o palpable nodularity  Cardiovascular: +S1/S2; RRR  Respiratory: CTA B/L; no W/R/R  Gastrointestinal: soft, NT/ND; +BSx4  Extremities: WWP; no edema, clubbing or cyanosis  Vascular: 2+ radial, DP/PT pulses B/L  Neurological: AAOx3; no focal deficits    MEDICATIONS:  MEDICATIONS  (STANDING):  acetaminophen     Tablet .. 650 milliGRAM(s) Oral every 6 hours  escitalopram 5 milliGRAM(s) Oral daily  gabapentin 100 milliGRAM(s) Oral every 24 hours  pantoprazole  Injectable 40 milliGRAM(s) IV Push every 12 hours  polyethylene glycol 3350 17 Gram(s) Oral two times a day  senna 2 Tablet(s) Oral at bedtime    MEDICATIONS  (PRN):  Biotene Dry Mouth Oral Rinse 5 milliLiter(s) Swish and Spit three times a day PRN Mouth Care  traMADol 25 milliGRAM(s) Oral every 12 hours PRN Moderate Pain (4 - 6)      ALLERGIES:  Allergies    No Known Allergies    Intolerances        LABS:                        8.0    9.29  )-----------( 225      ( 22 Mar 2024 06:58 )             23.2     03-22    140  |  107  |  37<H>  ----------------------------<  98  3.8   |  24  |  1.00    Ca    8.0<L>      22 Mar 2024 06:58  Phos  2.9     03-22  Mg     2.4     03-22    TPro  5.0<L>  /  Alb  2.7<L>  /  TBili  1.6<H>  /  DBili  x   /  AST  26  /  ALT  14  /  AlkPhos  66  03-21    PT/INR - ( 20 Mar 2024 23:06 )   PT: 12.3 sec;   INR: 1.08          PTT - ( 20 Mar 2024 23:06 )  PTT:24.2 sec  Urinalysis Basic - ( 22 Mar 2024 06:58 )    Color: x / Appearance: x / SG: x / pH: x  Gluc: 98 mg/dL / Ketone: x  / Bili: x / Urobili: x   Blood: x / Protein: x / Nitrite: x   Leuk Esterase: x / RBC: x / WBC x   Sq Epi: x / Non Sq Epi: x / Bacteria: x      CAPILLARY BLOOD GLUCOSE      POCT Blood Glucose.: 111 mg/dL (21 Mar 2024 11:28)      RADIOLOGY & ADDITIONAL TESTS: Reviewed.   OVERNIGHT EVENTS: No acute events overnight   SUBJECTIVE / INTERVAL HPI: Patient seen and examined at bedside. Patient was awakened from sleep. Patient reports feeling "okay". She is AAO X3 (person, place, time). She denies any feelings of weakness or dizziness. She states she has not gotten out of bed yet while being in the hospital. She states at home she normally ambulates with the help of her home health aid and using a walker. She reports she still has not had a bowel movement; no bowel movement in the last 4-5 days. She states she feels constipated. She states she has noticed her "belly getting bigger" over the last few days while she has been feeling constipated. She states she has been urinating normally with no burning pain/or increase in frequency. She reports she is still experiencing her constant bilateral back pain due to her thoracolumbar compression fractures. She reports no other symptoms at this time.     VITAL SIGNS:  Vital Signs Last 24 Hrs  T(C): 36.4 (22 Mar 2024 06:04), Max: 36.9 (21 Mar 2024 14:17)  T(F): 97.5 (22 Mar 2024 06:04), Max: 98.4 (21 Mar 2024 14:17)  HR: 76 (22 Mar 2024 06:04) (76 - 84)  BP: 149/69 (22 Mar 2024 06:04) (104/63 - 149/69)  BP(mean): 84 (21 Mar 2024 16:00) (84 - 84)  RR: 18 (22 Mar 2024 06:04) (17 - 18)  SpO2: 96% (22 Mar 2024 06:04) (96% - 97%)    Parameters below as of 21 Mar 2024 21:19  Patient On (Oxygen Delivery Method): room air        PHYSICAL EXAM:    General: NAD  HEENT: NC/AT; PERRL, anicteric sclera; MMM  Neck: supple w/o palpable nodularity  Cardiovascular: +S1/S2; RRR  Respiratory: CTA B/L; no W/R/R  Gastrointestinal: soft, Distended, tender to palpation in bilateral lower quadrants, +BS   Extremities: WWP; no edema, clubbing or cyanosis  Vascular: 2+ radial, DP/PT pulses B/L  Neurological: AAOx3; no focal deficits    MEDICATIONS:  MEDICATIONS  (STANDING):  acetaminophen     Tablet .. 650 milliGRAM(s) Oral every 6 hours  escitalopram 5 milliGRAM(s) Oral daily  gabapentin 100 milliGRAM(s) Oral every 24 hours  pantoprazole  Injectable 40 milliGRAM(s) IV Push every 12 hours  polyethylene glycol 3350 17 Gram(s) Oral two times a day  senna 2 Tablet(s) Oral at bedtime    MEDICATIONS  (PRN):  Biotene Dry Mouth Oral Rinse 5 milliLiter(s) Swish and Spit three times a day PRN Mouth Care  traMADol 25 milliGRAM(s) Oral every 12 hours PRN Moderate Pain (4 - 6)      ALLERGIES:  Allergies    No Known Allergies    Intolerances        LABS:                        8.0    9.29  )-----------( 225      ( 22 Mar 2024 06:58 )             23.2     03-22    140  |  107  |  37<H>  ----------------------------<  98  3.8   |  24  |  1.00    Ca    8.0<L>      22 Mar 2024 06:58  Phos  2.9     03-22  Mg     2.4     03-22    TPro  5.0<L>  /  Alb  2.7<L>  /  TBili  1.6<H>  /  DBili  x   /  AST  26  /  ALT  14  /  AlkPhos  66  03-21    PT/INR - ( 20 Mar 2024 23:06 )   PT: 12.3 sec;   INR: 1.08          PTT - ( 20 Mar 2024 23:06 )  PTT:24.2 sec  Urinalysis Basic - ( 22 Mar 2024 06:58 )    Color: x / Appearance: x / SG: x / pH: x  Gluc: 98 mg/dL / Ketone: x  / Bili: x / Urobili: x   Blood: x / Protein: x / Nitrite: x   Leuk Esterase: x / RBC: x / WBC x   Sq Epi: x / Non Sq Epi: x / Bacteria: x      CAPILLARY BLOOD GLUCOSE      POCT Blood Glucose.: 111 mg/dL (21 Mar 2024 11:28)      RADIOLOGY & ADDITIONAL TESTS: Reviewed.   OVERNIGHT EVENTS: No acute events overnight   SUBJECTIVE / INTERVAL HPI: Patient seen and examined at bedside. Patient was awakened from sleep. Patient reports feeling "okay". She is AAO X3 (person, place, time). She denies any feelings of weakness or dizziness. She states she has not gotten out of bed yet while being in the hospital. She states at home she normally ambulates with the help of her home health aid and using a walker. She reports she still has not had a bowel movement, however per patient's nurse she states she had one bowel movement this morning. She states she feels constipated. She states she has noticed her "belly getting bigger" over the last few days while she has been feeling constipated. She states she has been urinating normally with no burning pain/or increase in frequency. She reports she is still experiencing her constant bilateral back pain due to her thoracolumbar compression fractures. She reports no other symptoms at this time.     VITAL SIGNS:  Vital Signs Last 24 Hrs  T(C): 36.4 (22 Mar 2024 06:04), Max: 36.9 (21 Mar 2024 14:17)  T(F): 97.5 (22 Mar 2024 06:04), Max: 98.4 (21 Mar 2024 14:17)  HR: 76 (22 Mar 2024 06:04) (76 - 84)  BP: 149/69 (22 Mar 2024 06:04) (104/63 - 149/69)  BP(mean): 84 (21 Mar 2024 16:00) (84 - 84)  RR: 18 (22 Mar 2024 06:04) (17 - 18)  SpO2: 96% (22 Mar 2024 06:04) (96% - 97%)    Parameters below as of 21 Mar 2024 21:19  Patient On (Oxygen Delivery Method): room air        PHYSICAL EXAM:    General: NAD  HEENT: NC/AT; PERRL, anicteric sclera; MMM  Neck: supple w/o palpable nodularity  Cardiovascular: +S1/S2; RRR  Respiratory: CTA B/L; no W/R/R  Gastrointestinal: soft, Distended, tender to palpation in bilateral lower quadrants, +BS   Extremities: WWP; no edema, clubbing or cyanosis  Vascular: 2+ radial, DP/PT pulses B/L  Neurological: AAOx3; no focal deficits    MEDICATIONS:  MEDICATIONS  (STANDING):  acetaminophen     Tablet .. 650 milliGRAM(s) Oral every 6 hours  escitalopram 5 milliGRAM(s) Oral daily  gabapentin 100 milliGRAM(s) Oral every 24 hours  pantoprazole  Injectable 40 milliGRAM(s) IV Push every 12 hours  polyethylene glycol 3350 17 Gram(s) Oral two times a day  senna 2 Tablet(s) Oral at bedtime    MEDICATIONS  (PRN):  Biotene Dry Mouth Oral Rinse 5 milliLiter(s) Swish and Spit three times a day PRN Mouth Care  traMADol 25 milliGRAM(s) Oral every 12 hours PRN Moderate Pain (4 - 6)      ALLERGIES:  Allergies    No Known Allergies    Intolerances        LABS:                        8.0    9.29  )-----------( 225      ( 22 Mar 2024 06:58 )             23.2     03-22    140  |  107  |  37<H>  ----------------------------<  98  3.8   |  24  |  1.00    Ca    8.0<L>      22 Mar 2024 06:58  Phos  2.9     03-22  Mg     2.4     03-22    TPro  5.0<L>  /  Alb  2.7<L>  /  TBili  1.6<H>  /  DBili  x   /  AST  26  /  ALT  14  /  AlkPhos  66  03-21    PT/INR - ( 20 Mar 2024 23:06 )   PT: 12.3 sec;   INR: 1.08          PTT - ( 20 Mar 2024 23:06 )  PTT:24.2 sec  Urinalysis Basic - ( 22 Mar 2024 06:58 )    Color: x / Appearance: x / SG: x / pH: x  Gluc: 98 mg/dL / Ketone: x  / Bili: x / Urobili: x   Blood: x / Protein: x / Nitrite: x   Leuk Esterase: x / RBC: x / WBC x   Sq Epi: x / Non Sq Epi: x / Bacteria: x      CAPILLARY BLOOD GLUCOSE      POCT Blood Glucose.: 111 mg/dL (21 Mar 2024 11:28)      RADIOLOGY & ADDITIONAL TESTS: Reviewed.   OVERNIGHT EVENTS: No acute events overnight     SUBJECTIVE / INTERVAL HPI: Patient seen and examined at bedside. Patient was awakened from sleep. Patient reports feeling "okay". She is AAO X3 (person, place, time). She denies any feelings of weakness or dizziness. She states she has not gotten out of bed yet while being in the hospital. She states at home she normally ambulates with the help of her home health aid and using a walker. She reports she still has not had a bowel movement, however per patient's nurse she states she had one bowel movement this morning. She states she feels constipated. She states she has noticed her "belly getting bigger" over the last few days while she has been feeling constipated. She states she has been urinating normally with no burning pain/or increase in frequency. She reports she is still experiencing her constant bilateral back pain due to her thoracolumbar compression fractures. She reports no other symptoms at this time.     VITAL SIGNS:  Vital Signs Last 24 Hrs  T(C): 36.4 (22 Mar 2024 06:04), Max: 36.9 (21 Mar 2024 14:17)  T(F): 97.5 (22 Mar 2024 06:04), Max: 98.4 (21 Mar 2024 14:17)  HR: 76 (22 Mar 2024 06:04) (76 - 84)  BP: 149/69 (22 Mar 2024 06:04) (104/63 - 149/69)  BP(mean): 84 (21 Mar 2024 16:00) (84 - 84)  RR: 18 (22 Mar 2024 06:04) (17 - 18)  SpO2: 96% (22 Mar 2024 06:04) (96% - 97%)    Parameters below as of 21 Mar 2024 21:19  Patient On (Oxygen Delivery Method): room air        PHYSICAL EXAM:    General: NAD  HEENT: NC/AT; PERRL, anicteric sclera; MMM  Neck: supple w/o palpable nodularity  Cardiovascular: +S1/S2; RRR  Respiratory: CTA B/L; no W/R/R  Gastrointestinal: soft, Distended, mildly tender to palpation in bilateral lower quadrants, +BS   Extremities: WWP; no edema, clubbing or cyanosis  Vascular: 2+ radial, DP/PT pulses B/L  Neurological: AAOx3; no focal deficits    MEDICATIONS:  MEDICATIONS  (STANDING):  acetaminophen     Tablet .. 650 milliGRAM(s) Oral every 6 hours  escitalopram 5 milliGRAM(s) Oral daily  gabapentin 100 milliGRAM(s) Oral every 24 hours  pantoprazole  Injectable 40 milliGRAM(s) IV Push every 12 hours  polyethylene glycol 3350 17 Gram(s) Oral two times a day  senna 2 Tablet(s) Oral at bedtime    MEDICATIONS  (PRN):  Biotene Dry Mouth Oral Rinse 5 milliLiter(s) Swish and Spit three times a day PRN Mouth Care  traMADol 25 milliGRAM(s) Oral every 12 hours PRN Moderate Pain (4 - 6)      ALLERGIES:  Allergies    No Known Allergies    Intolerances        LABS:                        8.0    9.29  )-----------( 225      ( 22 Mar 2024 06:58 )             23.2     03-22    140  |  107  |  37<H>  ----------------------------<  98  3.8   |  24  |  1.00    Ca    8.0<L>      22 Mar 2024 06:58  Phos  2.9     03-22  Mg     2.4     03-22    TPro  5.0<L>  /  Alb  2.7<L>  /  TBili  1.6<H>  /  DBili  x   /  AST  26  /  ALT  14  /  AlkPhos  66  03-21    PT/INR - ( 20 Mar 2024 23:06 )   PT: 12.3 sec;   INR: 1.08          PTT - ( 20 Mar 2024 23:06 )  PTT:24.2 sec  Urinalysis Basic - ( 22 Mar 2024 06:58 )    Color: x / Appearance: x / SG: x / pH: x  Gluc: 98 mg/dL / Ketone: x  / Bili: x / Urobili: x   Blood: x / Protein: x / Nitrite: x   Leuk Esterase: x / RBC: x / WBC x   Sq Epi: x / Non Sq Epi: x / Bacteria: x      CAPILLARY BLOOD GLUCOSE      POCT Blood Glucose.: 111 mg/dL (21 Mar 2024 11:28)      RADIOLOGY & ADDITIONAL TESTS: Reviewed.   OVERNIGHT EVENTS: No acute events overnight     SUBJECTIVE / INTERVAL HPI: Patient seen and examined at bedside. Patient was awakened from sleep. Patient reports feeling "okay". She is AAO X3 (person, place, time). She denies any feelings of weakness or dizziness. She states she has not gotten out of bed yet while being in the hospital. She states at home she normally ambulates with the help of her home health aid and using a walker. She reports she still has not had a bowel movement, however per patient's nurse she states she had one bowel movement this morning. She states she feels constipated. She states she has noticed her "belly getting bigger" over the last few days while she has been feeling constipated. She states she has been urinating normally with no burning pain/or increase in frequency. She reports she is still experiencing her constant bilateral back pain due to her thoracolumbar compression fractures. She reports no other symptoms at this time.     VITAL SIGNS:  Vital Signs Last 24 Hrs  T(C): 36.4 (22 Mar 2024 06:04), Max: 36.9 (21 Mar 2024 14:17)  T(F): 97.5 (22 Mar 2024 06:04), Max: 98.4 (21 Mar 2024 14:17)  HR: 76 (22 Mar 2024 06:04) (76 - 84)  BP: 149/69 (22 Mar 2024 06:04) (104/63 - 149/69)  BP(mean): 84 (21 Mar 2024 16:00) (84 - 84)  RR: 18 (22 Mar 2024 06:04) (17 - 18)  SpO2: 96% (22 Mar 2024 06:04) (96% - 97%)    Parameters below as of 21 Mar 2024 21:19  Patient On (Oxygen Delivery Method): room air        PHYSICAL EXAM:    General: NAD  HEENT: NC/AT; PERRL, anicteric sclera; MMM  Neck: supple w/o palpable nodularity  Cardiovascular: +S1/S2; RRR  Respiratory: CTA B/L; no W/R/R  Gastrointestinal: soft, Distended, mildly tender to palpation in bilateral lower quadrants, +BS   Extremities: WWP; +1 pitting edema   Vascular: 2+ radial, DP/PT pulses B/L  Neurological: AAOx3; no focal deficits    MEDICATIONS:  MEDICATIONS  (STANDING):  acetaminophen     Tablet .. 650 milliGRAM(s) Oral every 6 hours  escitalopram 5 milliGRAM(s) Oral daily  gabapentin 100 milliGRAM(s) Oral every 24 hours  pantoprazole  Injectable 40 milliGRAM(s) IV Push every 12 hours  polyethylene glycol 3350 17 Gram(s) Oral two times a day  senna 2 Tablet(s) Oral at bedtime    MEDICATIONS  (PRN):  Biotene Dry Mouth Oral Rinse 5 milliLiter(s) Swish and Spit three times a day PRN Mouth Care  traMADol 25 milliGRAM(s) Oral every 12 hours PRN Moderate Pain (4 - 6)      ALLERGIES:  Allergies    No Known Allergies    Intolerances        LABS:                        8.0    9.29  )-----------( 225      ( 22 Mar 2024 06:58 )             23.2     03-22    140  |  107  |  37<H>  ----------------------------<  98  3.8   |  24  |  1.00    Ca    8.0<L>      22 Mar 2024 06:58  Phos  2.9     03-22  Mg     2.4     03-22    TPro  5.0<L>  /  Alb  2.7<L>  /  TBili  1.6<H>  /  DBili  x   /  AST  26  /  ALT  14  /  AlkPhos  66  03-21    PT/INR - ( 20 Mar 2024 23:06 )   PT: 12.3 sec;   INR: 1.08          PTT - ( 20 Mar 2024 23:06 )  PTT:24.2 sec  Urinalysis Basic - ( 22 Mar 2024 06:58 )    Color: x / Appearance: x / SG: x / pH: x  Gluc: 98 mg/dL / Ketone: x  / Bili: x / Urobili: x   Blood: x / Protein: x / Nitrite: x   Leuk Esterase: x / RBC: x / WBC x   Sq Epi: x / Non Sq Epi: x / Bacteria: x      CAPILLARY BLOOD GLUCOSE      POCT Blood Glucose.: 111 mg/dL (21 Mar 2024 11:28)      RADIOLOGY & ADDITIONAL TESTS: Reviewed.

## 2024-03-22 NOTE — CHART NOTE - NSCHARTNOTEFT_GEN_A_CORE
Patient will need a hospital bed and gel overlay for home use. Patients head needs to be elevated 30 degrees to prevent aspirations and wedges and pillows have been tried and failed. Patient is unable to make frequent changes to body position on their own. The member can independent affect the adjustment by operating the controls. Patient will need a hospital bed and gel overlay for home use. Patients head needs to be elevated 30 degrees to prevent aspirations and wedges and pillows have been tried and failed. Patient is unable to make frequent changes to body position on their own. The member can independent affect the adjustment by operating the controls. The beneficiary has a medical condition which requires repositioning of the body not feasible in an ordinary bed. Patient will need a hospital bed and gel overlay for home use. Patients head needs to be elevated 30 degrees to prevent aspirations and wedges and pillows have been tried and failed. Patient is unable to make frequent changes to body position on their own. The member can independent affect the adjustment by operating the controls. The beneficiary has a medical condition which requires repositioning of the body not feasible in an ordinary bed.    The beneficiary has limited mobility - i.e., beneficiary cannot independently make changes in body position significant enough to alleviate pressure and with compromised circulatory status with significant anemia requiring transfusion.

## 2024-03-22 NOTE — DIETITIAN INITIAL EVALUATION ADULT - PERTINENT LABORATORY DATA
03-22    140  |  107  |  37<H>  ----------------------------<  98  3.8   |  24  |  1.00    Ca    8.0<L>      22 Mar 2024 06:58  Phos  2.9     03-22  Mg     2.4     03-22    TPro  5.0<L>  /  Alb  2.7<L>  /  TBili  1.6<H>  /  DBili  x   /  AST  26  /  ALT  14  /  AlkPhos  66  03-21  POCT Blood Glucose.: 111 mg/dL (03-21-24 @ 11:28)

## 2024-03-22 NOTE — PROGRESS NOTE ADULT - PROBLEM SELECTOR PLAN 4
-Patient follows with Dr. Stevens (ortho) outpatient. Pt was referred to Dr. Morley for pain management and received injections, with minimal relief.   -CT Thoracic and Lumbar Spine -Patient follows with Dr. Stevens (ortho) outpatient. Pt was referred to Dr. Morley for pain management and received injections, with minimal relief.   -CT Thoracic and Lumbar Spine on 3/3/24: increased depression along the superior and inferior end plate of L2 now w/moderate compression deformity, previously mild compression deformity. Cannot exclude acute on chronic compression deformity. Chronic severe compression deformities again noted at T12 and L1 with mild to moderate chronic compression deformity at L3 and L4, all unchanged. Multilevel thoracic compression deformities with severe deformity at T3 and T12, moderate deformity at T7 and mild deformity at T9. At T9 there is moderate canal stenosis secondary to disc bulge and mild bony retropulsion.  -PT scheduled to see patient today. Patient can be d/c if she is able to walk with them  -Pain control with tylenol 650 mg q6hr  -Follow up outpatient with ortho and pain management as discussed previously Presenting w/ prolonged h/o back pain, follows with Dr. Stevens (ortho) outpatient. Was referred to Dr. Morley for pain management and received injections, with minimal relief.   -CT Thoracic and Lumbar Spine 3/4/2024: Since 4/19/2022, increased depression along the superior and inferior endplate of L2 now w/ moderate compression deformity, previously mild compression deformity. Cannot exclude acute on chronic compression deformity. Chronic severe compression deformities again noted at T12 and L1 with   mild to moderate chronic compression deformity at L3 and L4, all unchanged. Multilevel thoracic compression deformities with severe deformity at T3 and T12, moderate deformity at T7 and mild deformity at T9. At T9 there is moderate canal stenosis secondary to disc bulge and mild bony retropulsion.    -PT rec home PT  -Pain control with tylenol 650 mg q6hr, tramadol   -Palliative consulted.

## 2024-03-23 ENCOUNTER — TRANSCRIPTION ENCOUNTER (OUTPATIENT)
Age: 89
End: 2024-03-23

## 2024-03-23 VITALS
SYSTOLIC BLOOD PRESSURE: 126 MMHG | HEART RATE: 82 BPM | DIASTOLIC BLOOD PRESSURE: 65 MMHG | RESPIRATION RATE: 16 BRPM | TEMPERATURE: 98 F | OXYGEN SATURATION: 96 %

## 2024-03-23 DIAGNOSIS — D62 ACUTE POSTHEMORRHAGIC ANEMIA: ICD-10-CM

## 2024-03-23 PROCEDURE — 80053 COMPREHEN METABOLIC PANEL: CPT

## 2024-03-23 PROCEDURE — 99232 SBSQ HOSP IP/OBS MODERATE 35: CPT

## 2024-03-23 PROCEDURE — 82962 GLUCOSE BLOOD TEST: CPT

## 2024-03-23 PROCEDURE — 36415 COLL VENOUS BLD VENIPUNCTURE: CPT

## 2024-03-23 PROCEDURE — 86901 BLOOD TYPING SEROLOGIC RH(D): CPT

## 2024-03-23 PROCEDURE — 84132 ASSAY OF SERUM POTASSIUM: CPT

## 2024-03-23 PROCEDURE — 84295 ASSAY OF SERUM SODIUM: CPT

## 2024-03-23 PROCEDURE — 87086 URINE CULTURE/COLONY COUNT: CPT

## 2024-03-23 PROCEDURE — 85730 THROMBOPLASTIN TIME PARTIAL: CPT

## 2024-03-23 PROCEDURE — 97161 PT EVAL LOW COMPLEX 20 MIN: CPT

## 2024-03-23 PROCEDURE — 85025 COMPLETE CBC W/AUTO DIFF WBC: CPT

## 2024-03-23 PROCEDURE — 85610 PROTHROMBIN TIME: CPT

## 2024-03-23 PROCEDURE — 83615 LACTATE (LD) (LDH) ENZYME: CPT

## 2024-03-23 PROCEDURE — 99285 EMERGENCY DEPT VISIT HI MDM: CPT

## 2024-03-23 PROCEDURE — 85027 COMPLETE CBC AUTOMATED: CPT

## 2024-03-23 PROCEDURE — 86900 BLOOD TYPING SEROLOGIC ABO: CPT

## 2024-03-23 PROCEDURE — P9016: CPT

## 2024-03-23 PROCEDURE — 87637 SARSCOV2&INF A&B&RSV AMP PRB: CPT

## 2024-03-23 PROCEDURE — 96374 THER/PROPH/DIAG INJ IV PUSH: CPT

## 2024-03-23 PROCEDURE — 86850 RBC ANTIBODY SCREEN: CPT

## 2024-03-23 PROCEDURE — 83010 ASSAY OF HAPTOGLOBIN QUANT: CPT

## 2024-03-23 PROCEDURE — 83605 ASSAY OF LACTIC ACID: CPT

## 2024-03-23 PROCEDURE — 87040 BLOOD CULTURE FOR BACTERIA: CPT

## 2024-03-23 PROCEDURE — 70450 CT HEAD/BRAIN W/O DYE: CPT | Mod: MC

## 2024-03-23 PROCEDURE — 86923 COMPATIBILITY TEST ELECTRIC: CPT

## 2024-03-23 PROCEDURE — 84100 ASSAY OF PHOSPHORUS: CPT

## 2024-03-23 PROCEDURE — 36430 TRANSFUSION BLD/BLD COMPNT: CPT

## 2024-03-23 PROCEDURE — 74174 CTA ABD&PLVS W/CONTRAST: CPT | Mod: MC

## 2024-03-23 PROCEDURE — 83735 ASSAY OF MAGNESIUM: CPT

## 2024-03-23 PROCEDURE — 81001 URINALYSIS AUTO W/SCOPE: CPT

## 2024-03-23 PROCEDURE — 80048 BASIC METABOLIC PNL TOTAL CA: CPT

## 2024-03-23 PROCEDURE — 82330 ASSAY OF CALCIUM: CPT

## 2024-03-23 PROCEDURE — 84484 ASSAY OF TROPONIN QUANT: CPT

## 2024-03-23 PROCEDURE — 71045 X-RAY EXAM CHEST 1 VIEW: CPT

## 2024-03-23 PROCEDURE — 82803 BLOOD GASES ANY COMBINATION: CPT

## 2024-03-23 RX ADMIN — TRAMADOL HYDROCHLORIDE 25 MILLIGRAM(S): 50 TABLET ORAL at 10:39

## 2024-03-23 RX ADMIN — PANTOPRAZOLE SODIUM 40 MILLIGRAM(S): 20 TABLET, DELAYED RELEASE ORAL at 06:04

## 2024-03-23 RX ADMIN — TRAMADOL HYDROCHLORIDE 25 MILLIGRAM(S): 50 TABLET ORAL at 11:39

## 2024-03-23 NOTE — PROGRESS NOTE ADULT - PROBLEM SELECTOR PLAN 6
On admission, patient had altered mental status/confusion. Post first 2 upRBCs, patients mental status currently at baseline. Currently AAO X3 (to person, place, time). At home, patient has home health aid.

## 2024-03-23 NOTE — PROGRESS NOTE ADULT - PROBLEM SELECTOR PLAN 3
History of urinary retention, likely exacerbated by constipation. Briseno placed in the ED 3/21.     Plan:  - Continue with bowel regimen as stated above.  -Briseno removed. Successful y passed TOV

## 2024-03-23 NOTE — PROGRESS NOTE ADULT - ASSESSMENT
I M    89 y o F PMH breast cancer s/p right mastectomy, urinary retention, thoracolumbar compression fractures, chronic anemia, mild dementia/cognitive impairment, recent admission for anemia requiring transfusion and spinal compression fractures BIBEMS for hypotension at home, found to have HgB 2.9 s/p 4u pRBCs with improved hb now stable for stepdown to [facility] for ongoing management.    Problem/Plan - 1:  ·  Problem: Anemia due to acute blood loss.   ·  Plan: resents w/ HgB 2.9 and HCT 19 on admission,   No obvious s/s of bleeding, with CTAP negative for bleed. Last admission 5/4/2023 for anemia HgB 6.7 and denied EGD/C-scope with GI for further evaluation. GI notified to follow up outpatient. Last C-scope was 15 years ago, normal findings with no h/o endoscopy.   -GOC Discussion: Patient stated she would not want to undergo EGD; most important goal is to be at home and for pain control  -, B9/12 WNL. Reticulocyte count WNL, % increased (appropriate response)  Most recent Hemoglobin: 8, HCT: 23.2, RBC: 2.54  Patient is s/p 4 RBCs transfusions.    Problem/Plan - 2:  ·  Problem: Constipation.   ·  Plan: Patient brought to the ED on 3/13 found to be constipated and had a small BM after enema. XR was negative for air fluid levels. Discharged from ED on bowel regimen. On admission 3/20, had not had BM in 6 days. RN reports 1 small BM 3/21 AM.  -CTAP significant for stool burden, no evidence of hemorrhage    Plan:  - Miralax TID, senna 2 tabs before bed   - Dulcolax enema daily.    Problem/Plan - 3:  ·  Problem: Urinary retention.   ·  Plan: History of urinary retention, likely exacerbated by constipation. Briseno placed in the ED 3/21.     Plan:  - Continue with bowel regimen as stated above.  -Briseno removed. Successful y passed TOV.    Problem/Plan - 4:  ·  Problem: Back pain.   ·  Plan: Presenting w/ prolonged h/o back pain, follows with Dr. Stevens (ortho) outpatient. Was referred to Dr. Morley for pain management and received injections, with minimal relief.   -CT Thoracic and Lumbar Spine 3/4/2024: Since 4/19/2022, increased depression along the superior and inferior endplate of L2 now w/ moderate compression deformity, previously mild compression deformity. Cannot exclude acute on chronic compression deformity. Chronic severe compression deformities again noted at T12 and L1 with   mild to moderate chronic compression deformity at L3 and L4, all unchanged. Multilevel thoracic compression deformities with severe deformity at T3 and T12, moderate deformity at T7 and mild deformity at T9. At T9 there is moderate canal stenosis secondary to disc bulge and mild bony retropulsion.    -PT rec home PT  -Pain control with tylenol 650 mg q6hr, tramadol   -Palliative consulted.    Problem/Plan - 5:  ·  Problem: Myocardial infarction type 2.   ·  Plan: ·  Plan: #RESOLVED  Troponin on admission 81 --> 83 -> 60, likely 2/2 demand ischemia. No chest pain or palpitations on exam. ECG sinus with APCs.    - NTD.    Problem/Plan - 6:  ·  Problem: Altered mental status.   ·  Plan: On admission, patient had altered mental status/confusion. Post first 2 upRBCs, patients mental status currently at baseline. Currently AAO X3 (to person, place, time). At home, patient has home health aid.    Problem/Plan - 7:  ·  Problem: Prophylactic measure.   ·  Plan: F: S/p 1L fluid at home; S/p1500 LR in ED   E: replete as needed  N: soft bite sized  DVT ppx: Holding iso bleed  Dispo: patient stable and appropriate for discharge today  CODE STATUS: DNR/DNI.        
89F PMH breast cancer s/p right mastectomy, urinary retention, thoracolumbar compression fractures, chronic anemia, mild dementia/cognitive impairment, recent admission for anemia requiring transfusion and spinal compression fractures BIBEMS for hypotension at home, found to have HgB 2.9 s/p 4u pRBCs with improved hb now stable for stepdown to [facility] for ongoing management.
89F PMH breast cancer s/p right mastectomy, urinary retention, thoracolumbar compression fractures, chronic anemia, mild dementia/cognitive impairment, recent admission for anemia requiring transfusion and spinal compression fractures BIBEMS for hypotension at home, found to have HgB 2.9 s/p 4u pRBCs with improved hb now stable for stepdown for ongoing management.
89F PMH breast cancer s/p right mastectomy, urinary retention, thoracolumbar compression fractures, chronic anemia, mild dementia/cognitive impairment, recent admission for anemia requiring transfusion and spinal compression fractures BIBEMS for hypotension at home, found to have HgB 2.9 s/p 4u pRBCs with repeat HgB 5.1, transferred to [facility] for further monitoring. 
89F PMH breast cancer s/p right mastectomy, urinary retention, thoracolumbar compression fractures, chronic anemia, mild dementia/cognitive impairment, recent admission for anemia requiring transfusion and spinal compression fractures BIBEMS for hypotension at home, found to have HgB 2.9 s/p 4u pRBCs with improved hb now stable for stepdown to [facility] for ongoing management.

## 2024-03-23 NOTE — PROGRESS NOTE ADULT - PROBLEM SELECTOR PLAN 4
Presenting w/ prolonged h/o back pain, follows with Dr. Stevens (ortho) outpatient. Was referred to Dr. Morley for pain management and received injections, with minimal relief.   -CT Thoracic and Lumbar Spine 3/4/2024: Since 4/19/2022, increased depression along the superior and inferior endplate of L2 now w/ moderate compression deformity, previously mild compression deformity. Cannot exclude acute on chronic compression deformity. Chronic severe compression deformities again noted at T12 and L1 with   mild to moderate chronic compression deformity at L3 and L4, all unchanged. Multilevel thoracic compression deformities with severe deformity at T3 and T12, moderate deformity at T7 and mild deformity at T9. At T9 there is moderate canal stenosis secondary to disc bulge and mild bony retropulsion.    -PT rec home PT  -Pain control with tylenol 650 mg q6hr, tramadol   -Palliative consulted.

## 2024-03-23 NOTE — DISCHARGE NOTE NURSING/CASE MANAGEMENT/SOCIAL WORK - NSDCPEFALRISK_GEN_ALL_CORE
For information on Fall & Injury Prevention, visit: [email or web address] OR  [email or web address] OR  https://www.cdc.gov/steadi/patient.html

## 2024-03-23 NOTE — PROGRESS NOTE ADULT - PROBLEM SELECTOR PLAN 7
F: S/p 1L fluid at home; S/p1500 LR in ED   E: replete as needed  N: soft bite sized  DVT ppx: Holding iso bleed  Dispo: patient stable and appropriate for discharge today  CODE STATUS: DNR/DNI

## 2024-03-23 NOTE — PROGRESS NOTE ADULT - PROBLEM SELECTOR PLAN 1
resents w/ HgB 2.9 and HCT 19 on admission,   No obvious s/s of bleeding, with CTAP negative for bleed. Last admission 5/4/2023 for anemia HgB 6.7 and denied EGD/C-scope with GI for further evaluation. GI notified to follow up outpatient. Last C-scope was 15 years ago, normal findings with no h/o endoscopy.   -GOC Discussion: Patient stated she would not want to undergo EGD; most important goal is to be at home and for pain control  -, B9/12 WNL. Reticulocyte count WNL, % increased (appropriate response)  Most recent Hemoglobin: 8, HCT: 23.2, RBC: 2.54  Patient is s/p 4 RBCs transfusions.

## 2024-03-23 NOTE — PROGRESS NOTE ADULT - SUBJECTIVE AND OBJECTIVE BOX
INTERVAL EVENTS: No o/n events. Denies CP, dyspnea, palpitations, presyncope, syncope, f/c/n/v.     REVIEW OF SYSTEMS:  Constitutional:     [X] negative [ ] fevers [ ] chills [ ] weight loss [ ] weight gain  HEENT:                  [X] negative [ ] dry eyes [ ] eye irritation [ ] postnasal drip [ ] nasal congestion  CV:                         [X] negative  [ ] chest pain [ ] orthopnea [ ] palpitations [ ] murmur  Resp:                     [X] negative [ ] cough [ ] shortness of breath [ ] wheezing [ ] sputum [ ] hemoptysis  GI:                          [X] negative [ ] nausea [ ] vomiting [ ] diarrhea [ ] constipation [ ] abd pain [ ] dysphagia   :                        [X] negative [ ] dysuria [ ] nocturia [ ] hematuria [ ] increased urinary frequency  MSK:                      [X] negative [ ] back pain [ ] myalgias [ ] arthralgias [ ] fracture  Skin:                       [X] negative [ ] rash [ ] itch  Neuro:                   [X] negative [ ] headache [ ] dizziness [ ] syncope [ ] weakness [ ] numbness  Psych:                    [X] negative [ ] anxiety [ ] depression  Endo:                     [X] negative [ ] diabetes [ ] thyroid problem  Heme/Lymph:      [X] negative [ ] anemia [ ] bleeding problem  Allergic/Immune: [X] negative [ ] itchy eyes [ ] nasal discharge [ ] hives [ ] angioedema    [X] All other systems negative or otherwise described above.  [ ] Unable to assess ROS due to ________.    PAST MEDICAL & SURGICAL HISTORY:  Breast CA    Falls    Multiple falls    Back pain    Urinary retention    Osteoporosis    Macrocytic anemia    Macrocytosis    H/O mastectomy, right    No significant past surgical history      MEDICATIONS  (STANDING):  pantoprazole  Injectable 40 milliGRAM(s) IV Push every 12 hours  polyethylene glycol 3350 17 Gram(s) Oral every 12 hours  senna 2 Tablet(s) Oral at bedtime    MEDICATIONS  (PRN):  acetaminophen     Tablet .. 650 milliGRAM(s) Oral every 6 hours PRN Mild Pain (1 - 3)  Biotene Dry Mouth Oral Rinse 5 milliLiter(s) Swish and Spit three times a day PRN Mouth Care  traMADol 25 milliGRAM(s) Oral every 12 hours PRN Moderate Pain (4 - 6)    ICU Vital Signs Last 24 Hrs  T(C): 36.4 (23 Mar 2024 06:18), Max: 36.7 (22 Mar 2024 14:23)  T(F): 97.5 (23 Mar 2024 06:18), Max: 98.1 (22 Mar 2024 14:23)  HR: 80 (23 Mar 2024 06:18) (80 - 93)  BP: 124/70 (23 Mar 2024 06:18) (103/51 - 144/68)  BP(mean): --  ABP: --  ABP(mean): --  RR: 18 (23 Mar 2024 06:18) (16 - 18)  SpO2: 95% (23 Mar 2024 06:18) (94% - 95%)    O2 Parameters below as of 23 Mar 2024 06:18  Patient On (Oxygen Delivery Method): room air          Orthostatic VS    Daily     Daily   I&O's Summary    22 Mar 2024 07:01  -  23 Mar 2024 07:00  --------------------------------------------------------  IN: 0 mL / OUT: 650 mL / NET: -650 mL        PHYSICAL EXAM:  General: NAD  HEENT: NC/AT; PERRL, anicteric sclera; MMM  Neck: supple w/o palpable nodularity  Cardiovascular: +S1/S2; RRR  Respiratory: CTA B/L; no W/R/R  Gastrointestinal: soft, Distended, mildly tender to palpation in bilateral lower quadrants, +BS   Extremities: WWP; +1 pitting edema   Vascular: 2+ radial, DP/PT pulses B/L  Neurological: AAOx3; no focal deficits      LABS:                        8.0    9.29  )-----------( 225      ( 22 Mar 2024 06:58 )             23.2       03-22    140  |  107  |  37<H>  ----------------------------<  98  3.8   |  24  |  1.00    Ca    8.0<L>      22 Mar 2024 06:58  Phos  2.9     03-22  Mg     2.4     03-22    TPro  5.0<L>  /  Alb  2.7<L>  /  TBili  1.6<H>  /  DBili  x   /  AST  26  /  ALT  14  /  AlkPhos  66  03-21      Urinalysis Basic - ( 22 Mar 2024 06:58 )    Color: x / Appearance: x / SG: x / pH: x  Gluc: 98 mg/dL / Ketone: x  / Bili: x / Urobili: x   Blood: x / Protein: x / Nitrite: x   Leuk Esterase: x / RBC: x / WBC x   Sq Epi: x / Non Sq Epi: x / Bacteria: x        Culture - Urine (collected 21 Mar 2024 00:49)  Source: Clean Catch Clean Catch (Midstream)  Final Report (22 Mar 2024 08:12):    No growth    Culture - Blood (collected 20 Mar 2024 22:47)  Source: .Blood Blood-Peripheral  Preliminary Report (23 Mar 2024 01:00):    No growth at 2 days.    Culture - Blood (collected 20 Mar 2024 22:47)  Source: .Blood Blood-Peripheral  Preliminary Report (23 Mar 2024 01:00):    No growth at 2 days.        RADIOLOGY & ADDITIONAL STUDIES:    Other misc imaging: none       INTERVAL EVENTS: No o/n events. Denies CP, dyspnea, palpitations, presyncope, syncope, f/c/n/v.     REVIEW OF SYSTEMS:  Constitutional:     [X] negative [ ] fevers [ ] chills [ ] weight loss [ ] weight gain  HEENT:                  [X] negative [ ] dry eyes [ ] eye irritation [ ] postnasal drip [ ] nasal congestion  CV:                         [X] negative  [ ] chest pain [ ] orthopnea [ ] palpitations [ ] murmur  Resp:                     [X] negative [ ] cough [ ] shortness of breath [ ] wheezing [ ] sputum [ ] hemoptysis  GI:                          [X] negative [ ] nausea [ ] vomiting [ ] diarrhea [ ] constipation [ ] abd pain [ ] dysphagia   :                        [X] negative [ ] dysuria [ ] nocturia [ ] hematuria [ ] increased urinary frequency  MSK:                      [X] negative [ ] back pain [ ] myalgias [ ] arthralgias [ ] fracture  Skin:                       [X] negative [ ] rash [ ] itch  Neuro:                   [X] negative [ ] headache [ ] dizziness [ ] syncope [ ] weakness [ ] numbness  Psych:                    [X] negative [ ] anxiety [ ] depression  Endo:                     [X] negative [ ] diabetes [ ] thyroid problem  Heme/Lymph:      [X] negative [ ] anemia [ ] bleeding problem  Allergic/Immune: [X] negative [ ] itchy eyes [ ] nasal discharge [ ] hives [ ] angioedema    [X] All other systems negative or otherwise described above.  [ ] Unable to assess ROS due to ________.    PAST MEDICAL & SURGICAL HISTORY:  Breast CA    Falls    Multiple falls    Back pain    Urinary retention    Osteoporosis    Macrocytic anemia    Macrocytosis    H/O mastectomy, right    No significant past surgical history      MEDICATIONS  (STANDING):  pantoprazole  Injectable 40 milliGRAM(s) IV Push every 12 hours  polyethylene glycol 3350 17 Gram(s) Oral every 12 hours  senna 2 Tablet(s) Oral at bedtime    MEDICATIONS  (PRN):  acetaminophen     Tablet .. 650 milliGRAM(s) Oral every 6 hours PRN Mild Pain (1 - 3)  Biotene Dry Mouth Oral Rinse 5 milliLiter(s) Swish and Spit three times a day PRN Mouth Care  traMADol 25 milliGRAM(s) Oral every 12 hours PRN Moderate Pain (4 - 6)    ICU Vital Signs Last 24 Hrs  T(C): 36.4 (23 Mar 2024 06:18), Max: 36.7 (22 Mar 2024 14:23)  T(F): 97.5 (23 Mar 2024 06:18), Max: 98.1 (22 Mar 2024 14:23)  HR: 80 (23 Mar 2024 06:18) (80 - 93)  BP: 124/70 (23 Mar 2024 06:18) (103/51 - 144/68)  BP(mean): --  ABP: --  ABP(mean): --  RR: 18 (23 Mar 2024 06:18) (16 - 18)  SpO2: 95% (23 Mar 2024 06:18) (94% - 95%)    O2 Parameters below as of 23 Mar 2024 06:18  Patient On (Oxygen Delivery Method): room air          Orthostatic VS    Daily     Daily   I&O's Summary    22 Mar 2024 07:01  -  23 Mar 2024 07:00  --------------------------------------------------------  IN: 0 mL / OUT: 650 mL / NET: -650 mL        PHYSICAL EXAM:  General: NAD  HEENT: NC/AT; PERRL, anicteric sclera; MMM  Neck: supple w/o palpable nodularity  Cardiovascular: +S1/S2; RRR  Respiratory: CTA B/L; no W/R/R  Gastrointestinal: soft, Distended, mildly tender to palpation in bilateral lower quadrants, +BS   Extremities: WWP; +1 pitting edema   Vascular: 2+ radial, DP/PT pulses B/L  Neurological: AAOx3; no focal deficits      LABS:                        8.0    9.29  )-----------( 225      ( 22 Mar 2024 06:58 )             23.2       03-22    140  |  107  |  37<H>  ----------------------------<  98  3.8   |  24  |  1.00    Ca    8.0<L>      22 Mar 2024 06:58  Phos  2.9     03-22  Mg     2.4     03-22    TPro  5.0<L>  /  Alb  2.7<L>  /  TBili  1.6<H>  /  DBili  x   /  AST  26  /  ALT  14  /  AlkPhos  66  03-21      Urinalysis Basic - ( 22 Mar 2024 06:58 )    Color: x / Appearance: x / SG: x / pH: x  Gluc: 98 mg/dL / Ketone: x  / Bili: x / Urobili: x   Blood: x / Protein: x / Nitrite: x   Leuk Esterase: x / RBC: x / WBC x   Sq Epi: x / Non Sq Epi: x / Bacteria: x        Culture - Urine (collected 21 Mar 2024 00:49)  Source: Clean Catch Clean Catch (Midstream)  Final Report (22 Mar 2024 08:12):    No growth    Culture - Blood (collected 20 Mar 2024 22:47)  Source: .Blood Blood-Peripheral  Preliminary Report (23 Mar 2024 01:00):    No growth at 2 days.    Culture - Blood (collected 20 Mar 2024 22:47)  Source: .Blood Blood-Peripheral  Preliminary Report (23 Mar 2024 01:00):    No growth at 2 days.        RADIOLOGY & ADDITIONAL STUDIES:    Reviewed

## 2024-03-23 NOTE — PROGRESS NOTE ADULT - SUBJECTIVE AND OBJECTIVE BOX
Physical Medicine and Rehabilitation Progress Note :       Patient is a 89y old  Female who presents with a chief complaint of ANEMIA;FIB     (22 Mar 2024 11:16)      HPI:   89F PMH Breast Cx s/p R mastectomy, urinary retention, thoracolumbar compression fractures, chronic anemia, mild dementia/cognitive impairment, recent admission for anemia requiring transfusion and spinal compression fractures BIBEMS for hypotension at home. Patient herself is poor historian however per ED documentation reportedly has been having 2 weeks of declining mental status, confusion, fatigue, constipation, and back pain. Was seen day of admission by [facility] medicine provider and found to be hypotensive, tachycardic (new A-fib), and hemoccult positive on rectal exam. She was given 1 g of ceftriaxone and 1L IVF at home and transported to ED. Patient herself currently endorsing back pain, abdominal pain, mild nausea and 2 episodes of vomiting 4-5 days ago. LBM 4-5 days ago. Denies any bleeding from anywhere, no blood in urine/stool/vomit, no dark stools. Denies chest pain, SOB, or fevers at home. Reports she lives alone at home.     ED Course:   Vitals: T 98.7  /52 RR 16 saturating 98% on RA --> 98% on 2L NC  Labs: WBC 13.69 hgb 2.9 Hct 9.4  Trop 81 Lactate 30 BUN 52   Imaging:   ·	CT Angio A/P: No evidence of GI hemorrhage on this study. Constipation.  ·	CT Head: No hydrocephalus, midline shift, acute intracranial hemorrhage or demarcated territorial infarct.  Interventions: Pantoprazole 80 mg IV and PPI gtt, 2L LR + 500 mL LR, 2u pRB   (21 Mar 2024 05:30)                            8.0    9.29  )-----------( 225      ( 22 Mar 2024 06:58 )             23.2       03-22    140  |  107  |  37<H>  ----------------------------<  98  3.8   |  24  |  1.00    Ca    8.0<L>      22 Mar 2024 06:58  Phos  2.9     03-22  Mg     2.4     03-22    TPro  5.0<L>  /  Alb  2.7<L>  /  TBili  1.6<H>  /  DBili  x   /  AST  26  /  ALT  14  /  AlkPhos  66  03-21    Vital Signs Last 24 Hrs  T(C): 36.4 (23 Mar 2024 06:18), Max: 36.7 (22 Mar 2024 14:23)  T(F): 97.5 (23 Mar 2024 06:18), Max: 98.1 (22 Mar 2024 14:23)  HR: 80 (23 Mar 2024 06:18) (80 - 93)  BP: 124/70 (23 Mar 2024 06:18) (103/51 - 144/68)  BP(mean): --  RR: 18 (23 Mar 2024 06:18) (16 - 18)  SpO2: 95% (23 Mar 2024 06:18) (94% - 95%)    Parameters below as of 23 Mar 2024 06:18  Patient On (Oxygen Delivery Method): room air        MEDICATIONS  (STANDING):  pantoprazole  Injectable 40 milliGRAM(s) IV Push every 12 hours  polyethylene glycol 3350 17 Gram(s) Oral every 12 hours  senna 2 Tablet(s) Oral at bedtime    MEDICATIONS  (PRN):  acetaminophen     Tablet .. 650 milliGRAM(s) Oral every 6 hours PRN Mild Pain (1 - 3)  Biotene Dry Mouth Oral Rinse 5 milliLiter(s) Swish and Spit three times a day PRN Mouth Care  traMADol 25 milliGRAM(s) Oral every 12 hours PRN Moderate Pain (4 - 6)      T(C): 36.4 (03-23-24 @ 06:18), Max: 36.7 (03-22-24 @ 14:23)  HR: 80 (03-23-24 @ 06:18) (80 - 93)  BP: 124/70 (03-23-24 @ 06:18) (103/51 - 144/68)  RR: 18 (03-23-24 @ 06:18) (16 - 18)  SpO2: 95% (03-23-24 @ 06:18) (94% - 95%)        Physical Exam:      89 y o woman lying comfortably in semi Martin's position , awake ,  NAD     Head: normocephalic , atraumatic    Eyes: PERRLA , EOMI , no nystagmus , sclera anicteric    ENT / FACE: neg nasal discharge , uvula midline , no oropharyngeal erythema / exudate    Neck: supple , negative JVD , negative carotid bruits , no thyromegaly    Chest: CTA bilaterally     Cardiovascular: irregular rate and rhythm      Abdomen: soft , mildly distended , non tender to palpation in all 4 quadrants ,  normal bowel sounds     Extremities: WWP , neg cyanosis /clubbing / edema     Neurologic Exam:     Alert and oriented  x 3        Motor Exam:        > 3+/5 x 4 extremities       Sensation:         intact to light touch x 4 extremities     DTR:           biceps/brachioradialis: equal                            patella/ankle: equal          Previous Level of Function:     · Ambulation Skills	needs device and assist  · Transfer Skills	needs device and assist  · ADL Skills	needs device and assist  · Work/Leisure Activity	needs device and assist  · Additional Comments	Pt lives in an apartment alone, elevator apartment, ambulates with rolling walker and rollator. Pt has 24/7 HHA.    Cognitive Status Examination:   · Orientation	oriented to person, place, time and situation  · Level of Consciousness	alert  · Follows Commands and Answers Questions	100% of the time  · Personal Safety and Judgment	intact  · Short Term Memory	intact  · Long Term Memory	intact    Range of Motion Exam:   · Range of Motion Examination	bilateral upper extremity ROM was WFL (within functional limits); bilateral lower extremity ROM was WFL (within functional limits)    Manual Muscle Testing:   · Manual Muscle Testing Results	no strength deficits were identified  grossly >3+/5 MMT BUE and BLE    Bed Mobility: Rolling/Turning:     · Level of [place]	minimum assist (75% patients effort)  · Physical Assist/Nonphysical Assist	1 person assist    Bed Mobility: Supine to Sit:     · Level of [place]	minimum assist (75% patients effort)  · Physical Assist/Nonphysical Assist	1 person assist    Bed Mobility Analysis:     · Impairments Contributing to Impaired Bed Mobility	impaired balance; decreased strength    Transfer: Sit to Stand:     · Level of [place]	minimum assist (75% patients effort)  · Physical Assist/Nonphysical Assist	1 person assist  · Weight-Bearing Restrictions	weight-bearing as tolerated    Transfer: Stand to Sit:     · Level of [place]	minimum assist (75% patients effort)  · Physical Assist/Nonphysical Assist	1 person assist  · Weight-Bearing Restrictions	weight-bearing as tolerated    Sit/Stand Transfer Safety Analysis:     · Impairments Contributing to Impaired Transfers	impaired balance; decreased strength    Gait Skills:     · Level of [place]	minimum assist (75% patients effort)  · Physical Assist/Nonphysical Assist	1 person assist  · Weight-Bearing Restrictions	weight-bearing as tolerated  · Assistive Device	rolling walker  · Gait Distance	2 steps    Gait Analysis:     · Gait Pattern Used	2-point gait  · Impairments Contributing to Gait Deviations	impaired balance; decreased strength    Balance Skills Assessment:     · Sitting Balance: Static	fair balance  · Sitting Balance: Dynamic	fair balance  · Sit-to-Stand Balance	fair balance  · Standing Balance: Static	fair minus  · Standing Balance: Dynamic	fair minus  · Systems Impairment Contributing to Balance Disturbance	musculoskeletal  · Identified Impairments Contributing to Balance Disturbance	decreased strength    Clinical Impressions:   · Criteria for Skilled Therapeutic Interventions	impairments found; predicted duration of therapy intervention; functional limitations in following categories; anticipated equipment needs at discharge; risk reduction/prevention; anticipated discharge recommendation; rehab potential; therapy frequency  · Impairments Found (describe specific impairments)	aerobic capacity/endurance; gait, locomotion, and balance; gross motor; fine motor; ergonomics and body mechanics; integumentary integrity; joint integrity and mobility; muscle strength; poor safety awareness; posture  · Functional Limitations in Following Categories (describe specific limitations)	self-care; home management; work; community/leisure  · Risk Reduction/Prevention (Describe Specific Areas of risk reduction/prevention)	risk factors  · Risk Areas	fall            Initial Functional Status Assessment :             PM&R Impression : as above    Current disposition plan recommendation :     d/c home with home physical therapy

## 2024-03-23 NOTE — PROGRESS NOTE ADULT - PROBLEM SELECTOR PLAN 2
Patient brought to the ED on 3/13 found to be constipated and had a small BM after enema. XR was negative for air fluid levels. Discharged from ED on bowel regimen. On admission 3/20, had not had BM in 6 days. RN reports 1 small BM 3/21 AM.  -CTAP significant for stool burden, no evidence of hemorrhage    Plan:  - Miralax TID, senna 2 tabs before bed   - Dulcolax enema daily.

## 2024-03-23 NOTE — DISCHARGE NOTE NURSING/CASE MANAGEMENT/SOCIAL WORK - PATIENT PORTAL LINK FT
You can access the FollowMyHealth Patient Portal offered by [facility] by registering at the following website: [email or web address]. By joining [employer]’s FollowMyHealth portal, you will also be able to view your health information using other applications (apps) compatible with our system.

## 2024-03-23 NOTE — PROGRESS NOTE ADULT - PROBLEM SELECTOR PLAN 5
·  Plan: #RESOLVED  Troponin on admission 81 --> 83 -> 60, likely 2/2 demand ischemia. No chest pain or palpitations on exam. ECG sinus with APCs.    - NTD.

## 2024-03-26 ENCOUNTER — APPOINTMENT (OUTPATIENT)
Dept: PAIN MANAGEMENT | Facility: CLINIC | Age: 89
End: 2024-03-26
Payer: MEDICARE

## 2024-03-26 VITALS
HEART RATE: 99 BPM | DIASTOLIC BLOOD PRESSURE: 81 MMHG | HEIGHT: 62 IN | OXYGEN SATURATION: 98 % | BODY MASS INDEX: 23.37 KG/M2 | SYSTOLIC BLOOD PRESSURE: 133 MMHG | WEIGHT: 127 LBS

## 2024-03-26 DIAGNOSIS — M47.814 SPONDYLOSIS W/OUT MYELOPATHY OR RADICULOPATHY, THORACIC REGION: ICD-10-CM

## 2024-03-26 DIAGNOSIS — S32.000A WEDGE COMPRESSION FRACTURE OF UNSPECIFIED LUMBAR VERTEBRA, INITIAL ENCOUNTER FOR CLOSED FRACTURE: ICD-10-CM

## 2024-03-26 DIAGNOSIS — S22.080D WEDGE COMPRESSION FRACTURE OF T11-T12 VERTEBRA, SUBSEQUENT ENCOUNTER FOR FRACTURE WITH ROUTINE HEALING: ICD-10-CM

## 2024-03-26 DIAGNOSIS — K59.03 DRUG INDUCED CONSTIPATION: ICD-10-CM

## 2024-03-26 DIAGNOSIS — M81.0 AGE-RELATED OSTEOPOROSIS W/OUT CURRENT PATHOLOGICAL FRACTURE: ICD-10-CM

## 2024-03-26 DIAGNOSIS — T40.2X5A DRUG INDUCED CONSTIPATION: ICD-10-CM

## 2024-03-26 LAB
CULTURE RESULTS: SIGNIFICANT CHANGE UP
CULTURE RESULTS: SIGNIFICANT CHANGE UP
SPECIMEN SOURCE: SIGNIFICANT CHANGE UP
SPECIMEN SOURCE: SIGNIFICANT CHANGE UP

## 2024-03-26 PROCEDURE — 99214 OFFICE O/P EST MOD 30 MIN: CPT

## 2024-03-26 RX ORDER — NALOXEGOL OXALATE 12.5 MG/1
12.5 TABLET, FILM COATED ORAL DAILY
Qty: 20 | Refills: 0 | Status: ACTIVE | COMMUNITY
Start: 2024-03-12 | End: 1900-01-01

## 2024-03-26 RX ORDER — GABAPENTIN 100 MG/1
100 CAPSULE ORAL 3 TIMES DAILY
Qty: 90 | Refills: 0 | Status: ACTIVE | COMMUNITY
Start: 2024-03-26 | End: 1900-01-01

## 2024-03-26 NOTE — REVIEW OF SYSTEMS
[Back Pain] : back pain [Radiating Pain] : radiating pain [Decreased ROM] : decreased range of motion [Negative] : Respiratory

## 2024-03-29 NOTE — PHYSICAL EXAM
[Normal] : Non-labored breathing, no audible wheezes [Normal Spine curvature] : normal spine curvature [Spinous Process Tenderness] : spinous process tenderness [de-identified] : Pressure noted over upper thoracic and lower thoraco/lumbar spine processes with palpation.   [de-identified] : Patient in wheelchair unable to walk [de-identified] : Bruising noted on bilateral hands

## 2024-03-29 NOTE — HISTORY OF PRESENT ILLNESS
[FreeTextEntry1] : 89 year old female presents with worsening midline thoracic low back pain which has worsened since she was recently hospitalized for influenza and then discharged to subacute rehab. She has a known history of multiple compression fractures at T12, L3, L4. She has recently been evaluated by orthopedic spine surgery with new X-rays showing stable old fractures. She presents today with continued thoracic pain which radiates to her ribs bilaterally. She reports her low back pain is improved although now she mainly feels her thoracic pain. She has been taking Tylenol, Aleve, and Tramadol-Acetaminophen 37.5-325 mg three times a day for pain. She has been laying in bed most of the day because she has not been able to walk without feeling severe pain. She has not been using the Buprenorphine patch which was prescribed to her. She has been feeling constipated and has been taking Senna and Movantik, although she finished Movantik and needs a refill.

## 2024-03-29 NOTE — ASSESSMENT
[FreeTextEntry1] : Patient is a 89 year old female with a history of multiple compression fractures at T12, L3, L4 presents with chronic worsening midline back pain. 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 Patient had thoracic medial branch blocks T8-T10 with minimal improvement. She has been taking Tylenol, Aleve, and Tramadol-Acetaminophen 37.5-325 mg three times a day for pain with associated opioid induced constipation.   Plan: - Prescribed Movantik for constipation - Stop Tramadol-Acetaminophen 37.5-325 mg - Start Gabapentin 100 mg three times a day - Start Buprenorphine Patch 7.5 mg once weekly - Follow up in 2 weeks - Patient was given written instructions for plan

## 2024-04-01 DIAGNOSIS — E86.1 HYPOVOLEMIA: ICD-10-CM

## 2024-04-01 DIAGNOSIS — S22.089A UNSPECIFIED FRACTURE OF T11-T12 VERTEBRA, INITIAL ENCOUNTER FOR CLOSED FRACTURE: ICD-10-CM
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
## 2024-04-01 DIAGNOSIS — D62 ACUTE POSTHEMORRHAGIC ANEMIA: ICD-10-CM

## 2024-04-01 DIAGNOSIS — D64.9 ANEMIA, UNSPECIFIED: ICD-10-CM

## 2024-04-01 DIAGNOSIS — R33.9 RETENTION OF URINE, UNSPECIFIED: ICD-10-CM

## 2024-04-01 DIAGNOSIS — E87.20 ACIDOSIS, UNSPECIFIED: ICD-10-CM

## 2024-04-01 DIAGNOSIS — Z90.11 ACQUIRED ABSENCE OF RIGHT BREAST AND NIPPLE: ICD-10-CM

## 2024-04-01 DIAGNOSIS — S32.039A UNSPECIFIED FRACTURE OF THIRD LUMBAR VERTEBRA, INITIAL ENCOUNTER FOR CLOSED FRACTURE: ICD-10-CM

## 2024-04-01 DIAGNOSIS — E86.0 DEHYDRATION: ICD-10-CM

## 2024-04-01 DIAGNOSIS — R29.6 REPEATED FALLS: ICD-10-CM

## 2024-04-01 DIAGNOSIS — I48.91 UNSPECIFIED ATRIAL FIBRILLATION: ICD-10-CM

## 2024-04-01 DIAGNOSIS — I21.A1 MYOCARDIAL INFARCTION TYPE 2: ICD-10-CM

## 2024-04-01 DIAGNOSIS — S32.049A UNSPECIFIED FRACTURE OF FOURTH LUMBAR VERTEBRA, INITIAL ENCOUNTER FOR CLOSED FRACTURE: ICD-10-CM

## 2024-04-01 DIAGNOSIS — Z85.3 PERSONAL HISTORY OF MALIGNANT NEOPLASM OF BREAST: ICD-10-CM

## 2024-04-01 DIAGNOSIS — M54.9 DORSALGIA, UNSPECIFIED: ICD-10-CM

## 2024-04-01 DIAGNOSIS — S22.069A UNSPECIFIED FRACTURE OF T7-T8 VERTEBRA, INITIAL ENCOUNTER FOR CLOSED FRACTURE: ICD-10-CM

## 2024-04-01 DIAGNOSIS — Z66 DO NOT RESUSCITATE: ICD-10-CM

## 2024-04-01 DIAGNOSIS — I95.9 HYPOTENSION, UNSPECIFIED: ICD-10-CM

## 2024-04-01 DIAGNOSIS — S22.039A UNSPECIFIED FRACTURE OF THIRD THORACIC VERTEBRA, INITIAL ENCOUNTER FOR CLOSED FRACTURE: ICD-10-CM

## 2024-04-01 DIAGNOSIS — F03.A0 UNSPECIFIED DEMENTIA, MILD, WITHOUT BEHAVIORAL DISTURBANCE, PSYCHOTIC DISTURBANCE, MOOD DISTURBANCE, AND ANXIETY: ICD-10-CM

## 2024-04-01 DIAGNOSIS — K59.00 CONSTIPATION, UNSPECIFIED: ICD-10-CM

## 2024-04-01 DIAGNOSIS — K92.2 GASTROINTESTINAL HEMORRHAGE, UNSPECIFIED: ICD-10-CM

## 2024-04-01 DIAGNOSIS — R65.10 SYSTEMIC INFLAMMATORY RESPONSE SYNDROME (SIRS) OF NON-INFECTIOUS ORIGIN WITHOUT ACUTE ORGAN DYSFUNCTION: ICD-10-CM

## 2024-04-01 DIAGNOSIS — N17.9 ACUTE KIDNEY FAILURE, UNSPECIFIED: ICD-10-CM

## 2024-04-02 ENCOUNTER — APPOINTMENT (OUTPATIENT)
Dept: PAIN MANAGEMENT | Facility: CLINIC | Age: 89
End: 2024-04-02

## 2024-04-08 NOTE — DISCUSSION/SUMMARY
[de-identified] : Discussed my findings with the patient. Due to history of multiple thoracic and lumbar compression fractures, recommending MRI thoracic and lumbar spine without contrast and orders were given. She was also given an order for an off-the-shelf TLSO brace to use as needed. Given contact information for pain management, Dr. Miguel Morley. Follow up with me after imaging has been completed, sooner if there is an issue. All questions answered.

## 2024-04-08 NOTE — PHYSICAL EXAM
[de-identified] : General: patient is well developed, well nourished, in no acute  distress, alert and oriented x 3.    Mood and affect: normal    Respiratory: no respiratory distress noted    Skin: no scars over spine, skin intact, no erythema, increased warmth    Alignment:The spine is well compensated in the coronal and sagittal plane.    Gait: The patient is able to toe walk and heel walk with significant difficulty    Palpation: no tenderness to palpation spine or paraspinal region    Range of motion: Lumbar spine ROM is deferred    Neurologic Exam:  Motor: Manual Muscle testing in the lower extremities is 5 out of 5 in all muscle groups. There is no evidence of muscular atrophy in the lower extremities. Sensory: Sensation to light touch is grossly intact in the lower extremities    Reflexes: DTR are present and symmetric throughout    Hip Exam: No pain with internal or external rotation of hips bilaterally    Special tests: Straight leg raise test negative. Cross straight leg test negative.     [de-identified] : XR 2/27/24 (my read):  T12, L1, L3 and L4 compression fractures, unchanged from prior imaging, no additional fractures seen  CT 4/19/22 (my read): severe T12 and L1 compression fractures, additional fractures at L3 and L4, unchanged from 12/2021 CT lumbar  XR 4/25/22 (my read): T12, L1, L3 and L4 compression fractures, unchanged from 4/19 CT, good sagittal and coronal alignment, no additional fractures evident.

## 2024-04-08 NOTE — HISTORY OF PRESENT ILLNESS
[de-identified] : Follow up 2/27/24: Patient returns for evaluation. Reports increase in mid and low back pain, non-radiating, over the past week. Sustained a fall late 1/2024 and was hospitalized for flu for a week (then discharged to [facility] for an additional 2 weeks). However, increase in back pain did not occur until about 3 weeks after fall. She has been taking oral NSAIDs and tylenol without relief. Has been using a wheelchair over the past 2-3 days due to back pain (previously ambulated with walker at home and only used wheelchair outside of the home).   Initial 4/27/22: Ms. JUSTIN is a very pleasant 89 year old female who complains of chronic low back pain, worse after fall 1 week ago. Pain localized to mid and low back pain. No lower extremity radicular pain, no lower extremity paresthesias/numbness/weakness. Was admitted to [facility] 4/19-4/21 for pain control and work up, returned to ED 4/25 for pain control. Now using wheelchair outside home and at home (was using walker prior to most recent fall).    The patient no history of previous spine surgery.    The patient has no history of unexpected weight loss, no history of active cancer, no history bladder or bowel dysfunction, no night pain, no fevers or chills.    The past medical history, surgical history, family history, allergies, medications, 10+ point review of systems, family history and social history were reviewed and non contributory.

## 2024-04-09 ENCOUNTER — APPOINTMENT (OUTPATIENT)
Dept: PAIN MANAGEMENT | Facility: CLINIC | Age: 89
End: 2024-04-09

## 2024-05-29 NOTE — HISTORY OF PRESENT ILLNESS
[Back Pain] : back pain [___ wks] : [unfilled] week(s) ago [3] : an average pain level of 3/10 [1] : a minimum pain level of 1/10 [6] : a maximum pain level of 6/10 [Sharp] : sharp [Dull] : dull [Aching] : aching [Burning] : burning [Sitting] : sitting [Standing] : standing [Transitioning] : transitioning [Bending] : bending [Laying] : laying [FreeTextEntry1] : 89 year old female presents with worsening midline thoracic low back pain which has worsened since she was recently hospitalized for influenza and then discharged to subacute rehab. She has a known history of multiple compression fractures at T12, L3, L4. She has recently been evaluated by orthopedic spine surgery with new X-rays showing stable old fractures. She has been ordered a new lumbar and thoracic MRI which he has scheduled for March 7th. Her symptoms today are predominantly axial with mild radiation along her right ribs. She has tried Tylenol, Advil, lidocaine patches for pain control with minimal relief. Patient denies any new numbness, tingling, weakness, balance issues, bowel incontinence, bladder incontinence, or saddle anesthesia.   [FreeTextEntry7] : Mid thoracic spine

## 2024-05-29 NOTE — ASSESSMENT
[FreeTextEntry1] : 89 year old female with a history of multiple compression fractures at T12, L3, L4 presents with chronic worsening midline back pain. 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  Plan -Recommend thoracic medial branch blocks at the level of axial pain today -Recommend thoracic and lumbar MRI. Patient has scheduled for March 7 -Follow up for MRI review, can consideration for lumbar or thoracic epidural steroid injection.   Procedure Thoracic MBB bilateral  T8, T9, T10 Diagnostic with ultimately 80% relief  The potential benefits as well as rare but possible risks were reviewed with the patient.  These risks including infection including epidural abscess, meningitis, osteomyelitis, and discitis, bleeding including epidural hematoma, nerve injury, paralysis, failure to relieve pain or worse pain, headache, pneumothorax, elevated blood sugars, allergic reactions, adverse reactions to medications, vasovagal reactions, falls, etc. Following that discussion, all questions were again answered to the patients satisfaction, the patient stated their verbal understanding and written consent was obtained.    After obtaining consent, pre-procedure blood pressure and pulse were recorded and are in the nursing record for review. The patient was placed in a prone position. The respective thoracic area was prepped with chloroprep and draped in sterile fashion.   A 25 gauge 3.5 inch needle was inserted into the target medial branch nerve under fluoroscopic guidance. No paresthesias were elicited with needle placement and aspiration was negative for blood and CSF. Next 1 ml of a Solution of 4 ml Bupivacaine 0.25% and 10 mg Dexamethasone was injected.   The identical procedure was performed at the remaining levels. The skin was cleansed, and a sterile bandage was applied. Following the procedure, the patient's vital signs were stable. The patient tolerated the procedure well and no complications were encountered. The patient was discharged home in good condition with post-procedural instructions.  Time Out: Immediately prior to the procedure, the following was verbally confirmed that there is a consent form and that the correct patient, planned procedure, site and side are consistent with documentation and that necessary equipment and/or blood products are available prior to the start of the case.  Complications: none EBL: <5 cc

## 2024-08-27 NOTE — ED ADULT NURSE NOTE - NSHOSCREENINGQ1_ED_ALL_ED
Is the patient currently in the state of [place]? YES    Visit mode:VIDEO    If the visit is dropped, the patient can be reconnected by: VIDEO VISIT: Text to cell phone:   Telephone Information:   Mobile [phone]   Mobile Not on file.    and VIDEO VISIT: Send to e-mail at: [email or web address]    Will anyone else be joining the visit? NO  (If patient encounters technical issues they should call [phone]    How would you like to obtain your AVS? MyChart    Are changes needed to the allergy or medication list? No    Are refills needed on medications prescribed by this physician? NO    Reason for visit: Medication Therapy Management    Modesta BENITEZ       No

## 2024-09-23 NOTE — SWALLOW BEDSIDE ASSESSMENT ADULT - SLP PRECAUTIONS/LIMITATIONS: VISION
"Pt Presents to ED after recent discharge from ED. Pt states  that she feels as if she \"is about to die\". Pt cannot articulate any symptoms, speaking in full sentences, and appears in no apparent distress.  Pt is Aox3.   " within functional limits

## 2024-10-25 NOTE — PHYSICAL THERAPY INITIAL EVALUATION ADULT - GAIT DISTANCE, PT EVAL
Last office visit 8/25/23.  Upcoming appt none.  Last refill valsartan and fenofibrate 8/25/23.  Patient is overdue for appt.    Reception, please contact patient and assist with scheduling.  Route back when complete so refills may be sent.     ~20 feet x 1 (**required about 15 minutes to ambulate this distance)

## 2024-11-19 NOTE — PHYSICAL THERAPY INITIAL EVALUATION ADULT - LEVEL OF INDEPENDENCE, REHAB EVAL
Called and spoke to pt. Told him that it is not uncommon to have an increase in pain or discomfort following an injection. Explained to him that once the lidocaine in the injection wears off patients often complain of an increase in pain because it typically takes at least 48 hours for the cortisone to start to be effective. In the meantime there may be a localized inflammatory reaction to the injection causing the increases pain and swelling. Pt is icing, encouraged him to continue to do so. Also talked about OTC NSAIDS, pt had just taken 2 ibuprofen prior to phone call, told him that he can take 3 of them every 6-8 hours until symptoms improve. Encouraged him to eat when taking the medication to avoid an upset stomach. Pt verbalized understanding.     minimum assist (75% patients effort)

## 2025-01-13 NOTE — PHYSICAL THERAPY INITIAL EVALUATION ADULT - PHYSICAL ASSIST/NONPHYSICAL ASSIST: GAIT, REHAB EVAL
[facility]  Discharge Note  Short Stay    Procedure(s) (LRB):  L4-5 ZEUS (N/A)      OUTCOME: Patient tolerated treatment/procedure well without complication and is now ready for discharge.    DISPOSITION: Home or Self Care    FINAL DIAGNOSIS:  <principal problem not specified>    FOLLOWUP: In clinic    DISCHARGE INSTRUCTIONS:  No discharge procedures on file.     TIME SPENT ON DISCHARGE: 10 minutes   1 person assist

## 2025-02-21 NOTE — ED PROVIDER NOTE - SECONDARY DIAGNOSIS.
Reason for call:   [x] Refill   [] Prior Auth  [] Other:     Office:   [x] PCP/Provider -   [] Specialty/Provider -     Medication: omeprazole (PriLOSEC) 40 MG capsule     Dose/Frequency: Take 1 capsule (40 mg total) by mouth daily     Quantity: 90    [facility] [place]     Does the patient have enough for 3 days?   [] Yes   [x] No - Send as HP to POD    
Constipation
.

## 2025-07-08 NOTE — ED PROVIDER NOTE - WHICH SHOWED
Negative Screen XRay abd shows fecal impaction, DJD, known compression fractures, old pubic rami fractures, no air fluid levels.

## 2025-07-18 NOTE — ED ADULT TRIAGE NOTE - CHIEF COMPLAINT QUOTE
Refill Decision Note   Hiro Rodríguez  is requesting a refill authorization.  Brief Assessment and Rationale for Refill:  Approve     Medication Therapy Plan:        Comments:     Note composed:9:39 AM 07/18/2025             fall "I was standing and I fell"